# Patient Record
Sex: MALE | Race: WHITE | NOT HISPANIC OR LATINO | Employment: OTHER | URBAN - METROPOLITAN AREA
[De-identification: names, ages, dates, MRNs, and addresses within clinical notes are randomized per-mention and may not be internally consistent; named-entity substitution may affect disease eponyms.]

---

## 2017-03-17 ENCOUNTER — CONVERSION ENCOUNTER (OUTPATIENT)
Dept: FAMILY MEDICINE CLINIC | Facility: CLINIC | Age: 62
End: 2017-03-17

## 2017-03-17 LAB
ALBUMIN SERPL-MCNC: 4.4 G/DL (ref 3.6–5.1)
ALBUMIN/GLOB SERPL: ABNORMAL {RATIO} (ref 1–2.5)
ALP SERPL-CCNC: 60 UNITS/L (ref 40–115)
ALT SERPL-CCNC: 33 UNITS/L (ref 9–46)
AST SERPL-CCNC: 24 UNITS/L (ref 10–35)
BASOPHILS # BLD AUTO: ABNORMAL 10*3/MM3 (ref 0–200)
BASOPHILS NFR BLD AUTO: 0.4 %
BILIRUB SERPL-MCNC: 0.6 MG/DL (ref 0.2–1.2)
BUN SERPL-MCNC: 17 MG/DL (ref 7–25)
BUN/CREAT SERPL: ABNORMAL (ref 6–22)
CALCIUM SERPL-MCNC: 9.5 MG/DL (ref 8.6–10.3)
CHLORIDE SERPL-SCNC: 104 MMOL/L (ref 98–110)
CHOLEST SERPL-MCNC: 200 MG/DL (ref 125–200)
CHOLEST/HDLC SERPL: ABNORMAL {RATIO}
CO2 CONTENT VENOUS: 25 MMOL/L (ref 20–31)
CONV % HGB A1C: ABNORMAL %
CONV COMMENT: 0.06
CONV NEUTROPHILS/100 LEUKOCYTES IN BODY FLUID BY MANUAL COUNT: 65.1 %
CONV TOTAL PROTEIN: 7.4 G/DL (ref 6.1–8.1)
CREAT UR-MCNC: 0.81 MG/DL (ref 0.7–1.25)
EOSINOPHIL # BLD AUTO: 3.6 %
EOSINOPHIL # BLD AUTO: ABNORMAL 10*3/MM3 (ref 15–500)
ERYTHROCYTE [DISTWIDTH] IN BLOOD BY AUTOMATED COUNT: 13.9 % (ref 11–15)
FOLATE SERPL-MCNC: NORMAL NG/ML
GLOBULIN UR ELPH-MCNC: ABNORMAL G/DL (ref 1.9–3.7)
GLUCOSE SERPL-MCNC: 106 MG/DL (ref 65–99)
HCT VFR BLD AUTO: 42.1 % (ref 38.5–50)
HCV AB SER DONR QL: ABNORMAL
HDLC SERPL-MCNC: 32 MG/DL
HGB BLD-MCNC: 14.4 G/DL (ref 13.2–17.1)
LDLC SERPL CALC-MCNC: ABNORMAL MG/DL
LYMPHOCYTES # BLD AUTO: ABNORMAL 10*3/MM3 (ref 850–3900)
LYMPHOCYTES NFR BLD AUTO: 21.8 %
MCH RBC QN AUTO: 29.8 PG (ref 27–33)
MCHC RBC AUTO-ENTMCNC: ABNORMAL % (ref 32–36)
MCV RBC AUTO: 87 FL (ref 80–100)
MONOCYTES # BLD AUTO: ABNORMAL 10*3/MICROLITER (ref 200–950)
MONOCYTES NFR BLD AUTO: 9.1 %
NEUTROPHILS # BLD AUTO: ABNORMAL 10*3/MM3 (ref 1500–7800)
PLATELET # BLD AUTO: ABNORMAL 10*3/MM3 (ref 140–400)
PMV BLD AUTO: 8.6 FL (ref 7.5–12.5)
POTASSIUM SERPL-SCNC: 4.1 MMOL/L (ref 3.5–5.3)
PSA SERPL-MCNC: 3.5 NG/ML
RBC # BLD AUTO: ABNORMAL 10*6/MM3 (ref 4.2–5.8)
SODIUM SERPL-SCNC: 140 MMOL/L (ref 135–146)
T4 FREE SERPL-MCNC: 1.2 NG/DL (ref 0.8–1.8)
TRIGL SERPL-MCNC: 345 MG/DL
TSH SERPL-ACNC: 2.19 MICROINTL UNITS/ML (ref 0.4–4.5)
VIT B12 SERPL-MCNC: 630 PG/ML (ref 200–1100)
WBC # BLD AUTO: ABNORMAL K/UL (ref 3.8–10.8)

## 2018-06-04 ENCOUNTER — HOSPITAL ENCOUNTER (OUTPATIENT)
Dept: FAMILY MEDICINE CLINIC | Facility: CLINIC | Age: 63
Setting detail: SPECIMEN
Discharge: HOME | End: 2018-06-04
Attending: FAMILY MEDICINE | Admitting: FAMILY MEDICINE

## 2018-06-04 LAB
ALBUMIN SERPL-MCNC: 3.9 G/DL (ref 3.5–4.8)
ALBUMIN/GLOB SERPL: 1.3 {RATIO} (ref 1–1.7)
ALP SERPL-CCNC: 55 IU/L (ref 32–91)
ALT SERPL-CCNC: 32 IU/L (ref 17–63)
ANION GAP SERPL CALC-SCNC: 12.6 MMOL/L (ref 10–20)
AST SERPL-CCNC: 32 IU/L (ref 15–41)
BASOPHILS # BLD AUTO: 0.1 10*3/UL (ref 0–0.2)
BASOPHILS NFR BLD AUTO: 1 % (ref 0–2)
BILIRUB SERPL-MCNC: 0.5 MG/DL (ref 0.3–1.2)
BUN SERPL-MCNC: 21 MG/DL (ref 8–20)
BUN/CREAT SERPL: 23.3 (ref 6.2–20.3)
CALCIUM SERPL-MCNC: 9.1 MG/DL (ref 8.9–10.3)
CHLORIDE SERPL-SCNC: 102 MMOL/L (ref 101–111)
CHOLEST SERPL-MCNC: 180 MG/DL
CHOLEST/HDLC SERPL: 6.1 {RATIO}
CONV CO2: 24 MMOL/L (ref 22–32)
CONV LDL CHOLESTEROL DIRECT: 104 MG/DL (ref 0–100)
CONV TOTAL PROTEIN: 7 G/DL (ref 6.1–7.9)
CREAT UR-MCNC: 0.9 MG/DL (ref 0.7–1.2)
DIFFERENTIAL METHOD BLD: (no result)
EOSINOPHIL # BLD AUTO: 0.4 10*3/UL (ref 0–0.3)
EOSINOPHIL # BLD AUTO: 5 % (ref 0–3)
ERYTHROCYTE [DISTWIDTH] IN BLOOD BY AUTOMATED COUNT: 12.8 % (ref 11.5–14.5)
GLOBULIN UR ELPH-MCNC: 3.1 G/DL (ref 2.5–3.8)
GLUCOSE SERPL-MCNC: 103 MG/DL (ref 65–99)
HCT VFR BLD AUTO: 40.6 % (ref 40–54)
HDLC SERPL-MCNC: 29 MG/DL
HGB BLD-MCNC: 13.8 G/DL (ref 14–18)
LDLC/HDLC SERPL: 3.5 {RATIO}
LIPID INTERPRETATION: ABNORMAL
LYMPHOCYTES # BLD AUTO: 1.9 10*3/UL (ref 0.8–4.8)
LYMPHOCYTES NFR BLD AUTO: 25 % (ref 18–42)
MCH RBC QN AUTO: 28.8 PG (ref 26–32)
MCHC RBC AUTO-ENTMCNC: 34 G/DL (ref 32–36)
MCV RBC AUTO: 84.9 FL (ref 80–94)
MONOCYTES # BLD AUTO: 1 10*3/UL (ref 0.1–1.3)
MONOCYTES NFR BLD AUTO: 12 % (ref 2–11)
NEUTROPHILS # BLD AUTO: 4.4 10*3/UL (ref 2.3–8.6)
NEUTROPHILS NFR BLD AUTO: 57 % (ref 50–75)
NRBC BLD AUTO-RTO: 0 /100{WBCS}
NRBC/RBC NFR BLD MANUAL: 0 10*3/UL
PLATELET # BLD AUTO: 207 10*3/UL (ref 150–450)
PMV BLD AUTO: 8.5 FL (ref 7.4–10.4)
POTASSIUM SERPL-SCNC: 3.6 MMOL/L (ref 3.6–5.1)
RBC # BLD AUTO: 4.78 10*6/UL (ref 4.6–6)
SODIUM SERPL-SCNC: 135 MMOL/L (ref 136–144)
TRIGL SERPL-MCNC: 278 MG/DL
VLDLC SERPL CALC-MCNC: 46.9 MG/DL
WBC # BLD AUTO: 7.8 10*3/UL (ref 4.5–11.5)

## 2018-10-10 ENCOUNTER — HOSPITAL ENCOUNTER (OUTPATIENT)
Dept: FAMILY MEDICINE CLINIC | Facility: CLINIC | Age: 63
Setting detail: SPECIMEN
Discharge: HOME | End: 2018-10-10
Attending: FAMILY MEDICINE | Admitting: FAMILY MEDICINE

## 2018-10-10 LAB — PSA SERPL-MCNC: 5.39 NG/ML (ref 0–4)

## 2019-01-09 ENCOUNTER — HOSPITAL ENCOUNTER (OUTPATIENT)
Dept: FAMILY MEDICINE CLINIC | Facility: CLINIC | Age: 64
Setting detail: SPECIMEN
Discharge: HOME | End: 2019-01-09
Attending: FAMILY MEDICINE | Admitting: FAMILY MEDICINE

## 2019-06-06 ENCOUNTER — HOSPITAL ENCOUNTER (OUTPATIENT)
Dept: FAMILY MEDICINE CLINIC | Facility: CLINIC | Age: 64
Setting detail: SPECIMEN
Discharge: HOME | End: 2019-06-06
Attending: FAMILY MEDICINE | Admitting: FAMILY MEDICINE

## 2019-06-06 LAB
ALBUMIN SERPL-MCNC: 3.6 G/DL (ref 3.5–4.8)
ALBUMIN/GLOB SERPL: 1.1 {RATIO} (ref 1–1.7)
ALP SERPL-CCNC: 63 IU/L (ref 32–91)
ALT SERPL-CCNC: 30 IU/L (ref 17–63)
ANION GAP SERPL CALC-SCNC: 17.7 MMOL/L (ref 10–20)
AST SERPL-CCNC: 28 IU/L (ref 15–41)
BASOPHILS # BLD AUTO: 0.1 10*3/UL (ref 0–0.2)
BASOPHILS NFR BLD AUTO: 2 % (ref 0–2)
BILIRUB SERPL-MCNC: 0.5 MG/DL (ref 0.3–1.2)
BUN SERPL-MCNC: 16 MG/DL (ref 8–20)
BUN/CREAT SERPL: 17.8 (ref 6.2–20.3)
CALCIUM SERPL-MCNC: 9.1 MG/DL (ref 8.9–10.3)
CHLORIDE SERPL-SCNC: 99 MMOL/L (ref 101–111)
CHOLEST SERPL-MCNC: 162 MG/DL
CHOLEST/HDLC SERPL: 5.2 {RATIO}
CONV ABS BANDS: 0.1 10*3/UL
CONV ABS IMM GRAN: 0.07 10*3/UL
CONV CO2: 22 MMOL/L (ref 22–32)
CONV LDL CHOLESTEROL DIRECT: 98 MG/DL (ref 0–100)
CONV SMEAR REVIEW: (no result)
CONV TOTAL PROTEIN: 6.9 G/DL (ref 6.1–7.9)
CREAT UR-MCNC: 0.9 MG/DL (ref 0.7–1.2)
DIFFERENTIAL METHOD BLD: (no result)
EOSINOPHIL # BLD AUTO: 0.4 10*3/UL (ref 0–0.3)
EOSINOPHIL # BLD AUTO: 6 % (ref 0–3)
ERYTHROCYTE [DISTWIDTH] IN BLOOD BY AUTOMATED COUNT: 13.1 % (ref 11.5–14.5)
GLOBULIN UR ELPH-MCNC: 3.3 G/DL (ref 2.5–3.8)
GLUCOSE SERPL-MCNC: 104 MG/DL (ref 65–99)
HBA1C MFR BLD: 5.6 % (ref 0–5.6)
HCT VFR BLD AUTO: 41.9 % (ref 40–54)
HDLC SERPL-MCNC: 31 MG/DL
HGB BLD-MCNC: 14.4 G/DL (ref 14–18)
LDLC/HDLC SERPL: 3.2 {RATIO}
LIPID INTERPRETATION: ABNORMAL
LYMPHOCYTES # BLD AUTO: 1.4 10*3/UL (ref 0.8–4.8)
LYMPHOCYTES NFR BLD AUTO: 19 % (ref 18–42)
MCH RBC QN AUTO: 29.6 PG (ref 26–32)
MCHC RBC AUTO-ENTMCNC: 34.4 G/DL (ref 32–36)
MCV RBC AUTO: 86.1 FL (ref 80–94)
METAMYELOCYTES NFR BLD: 1 %
MONOCYTES # BLD AUTO: 0.3 10*3/UL (ref 0.1–1.3)
MONOCYTES NFR BLD AUTO: 4 % (ref 2–11)
NEUTROPHILS # BLD AUTO: 5 10*3/UL (ref 2.3–8.6)
NEUTROPHILS NFR BLD AUTO: 67 % (ref 50–75)
NEUTS BAND NFR BLD MANUAL: 1 % (ref 0–5)
PLATELET # BLD AUTO: 199 10*3/UL (ref 150–450)
PMV BLD AUTO: 8.2 FL (ref 7.4–10.4)
POTASSIUM SERPL-SCNC: 3.7 MMOL/L (ref 3.6–5.1)
PSA SERPL-MCNC: 7.54 NG/ML (ref 0–4)
RBC # BLD AUTO: 4.87 10*6/UL (ref 4.6–6)
SODIUM SERPL-SCNC: 135 MMOL/L (ref 136–144)
T4 FREE SERPL-MCNC: 0.75 NG/DL (ref 0.58–1.64)
TRIGL SERPL-MCNC: 284 MG/DL
TSH SERPL-ACNC: 2.51 UIU/ML (ref 0.34–5.6)
VIT B12 SERPL-MCNC: 507 PG/ML (ref 180–914)
VLDLC SERPL CALC-MCNC: 32.6 MG/DL
WBC # BLD AUTO: 7.4 10*3/UL (ref 4.5–11.5)

## 2019-06-17 RX ORDER — AMLODIPINE BESYLATE 5 MG/1
TABLET ORAL
Qty: 90 TABLET | Refills: 3 | Status: SHIPPED | OUTPATIENT
Start: 2019-06-17 | End: 2020-07-28

## 2019-06-17 RX ORDER — METOPROLOL SUCCINATE 50 MG/1
TABLET, EXTENDED RELEASE ORAL
Qty: 90 TABLET | Refills: 3 | Status: SHIPPED | OUTPATIENT
Start: 2019-06-17 | End: 2020-07-28

## 2019-06-24 RX ORDER — VALSARTAN AND HYDROCHLOROTHIAZIDE 320; 25 MG/1; MG/1
TABLET, FILM COATED ORAL
Qty: 90 TABLET | Refills: 3 | Status: SHIPPED | OUTPATIENT
Start: 2019-06-24 | End: 2020-07-28

## 2019-08-01 RX ORDER — ALPRAZOLAM 0.5 MG/1
TABLET ORAL
Qty: 60 TABLET | Refills: 0 | Status: SHIPPED | OUTPATIENT
Start: 2019-08-01 | End: 2019-10-14 | Stop reason: SDUPTHER

## 2019-10-14 RX ORDER — ALPRAZOLAM 0.5 MG/1
TABLET ORAL
Qty: 60 TABLET | Refills: 0 | Status: SHIPPED | OUTPATIENT
Start: 2019-10-14 | End: 2019-12-16 | Stop reason: SDUPTHER

## 2019-12-16 RX ORDER — ALPRAZOLAM 0.5 MG/1
TABLET ORAL
Qty: 60 TABLET | Refills: 0 | Status: SHIPPED | OUTPATIENT
Start: 2019-12-16 | End: 2020-02-13

## 2020-02-13 DIAGNOSIS — F41.9 ANXIETY: Primary | ICD-10-CM

## 2020-02-13 RX ORDER — ALPRAZOLAM 0.5 MG/1
TABLET ORAL
Qty: 60 TABLET | Refills: 2 | Status: SHIPPED | OUTPATIENT
Start: 2020-02-13 | End: 2020-03-14

## 2020-03-23 ENCOUNTER — OFFICE VISIT (OUTPATIENT)
Dept: FAMILY MEDICINE CLINIC | Facility: CLINIC | Age: 65
End: 2020-03-23

## 2020-03-23 VITALS
SYSTOLIC BLOOD PRESSURE: 130 MMHG | OXYGEN SATURATION: 98 % | HEART RATE: 83 BPM | DIASTOLIC BLOOD PRESSURE: 70 MMHG | TEMPERATURE: 98.3 F | HEIGHT: 72 IN | WEIGHT: 213 LBS | BODY MASS INDEX: 28.85 KG/M2 | RESPIRATION RATE: 16 BRPM

## 2020-03-23 DIAGNOSIS — S46.001A ROTATOR CUFF INJURY, RIGHT, INITIAL ENCOUNTER: Primary | ICD-10-CM

## 2020-03-23 PROBLEM — R97.20 ELEVATED PSA: Status: ACTIVE | Noted: 2018-06-11

## 2020-03-23 PROBLEM — N40.0 BENIGN PROSTATIC HYPERPLASIA WITHOUT URINARY OBSTRUCTION: Status: ACTIVE | Noted: 2018-06-11

## 2020-03-23 PROBLEM — G62.9 PERIPHERAL NEUROPATHY: Status: ACTIVE | Noted: 2017-09-05

## 2020-03-23 PROBLEM — I10 HYPERTENSION: Status: ACTIVE | Noted: 2020-03-23

## 2020-03-23 PROCEDURE — 20610 DRAIN/INJ JOINT/BURSA W/O US: CPT | Performed by: FAMILY MEDICINE

## 2020-03-23 RX ORDER — ALPRAZOLAM 0.5 MG/1
TABLET ORAL
COMMUNITY
Start: 2018-04-06 | End: 2020-10-13

## 2020-03-23 RX ORDER — BUPIVACAINE HYDROCHLORIDE 5 MG/ML
1 INJECTION, SOLUTION PERINEURAL
Status: COMPLETED | OUTPATIENT
Start: 2020-03-23 | End: 2020-03-23

## 2020-03-23 RX ORDER — TADALAFIL 5 MG/1
TABLET ORAL
COMMUNITY
Start: 2020-01-06 | End: 2020-04-16

## 2020-03-23 RX ORDER — METHYLPREDNISOLONE ACETATE 80 MG/ML
40 INJECTION, SUSPENSION INTRA-ARTICULAR; INTRALESIONAL; INTRAMUSCULAR; SOFT TISSUE
Status: COMPLETED | OUTPATIENT
Start: 2020-03-23 | End: 2020-03-23

## 2020-03-23 RX ORDER — METHYLPREDNISOLONE ACETATE 80 MG/ML
80 INJECTION, SUSPENSION INTRA-ARTICULAR; INTRALESIONAL; INTRAMUSCULAR; SOFT TISSUE
Status: COMPLETED | OUTPATIENT
Start: 2020-03-23 | End: 2020-03-23

## 2020-03-23 RX ORDER — GABAPENTIN 100 MG/1
300 CAPSULE ORAL NIGHTLY
COMMUNITY
Start: 2020-02-14 | End: 2022-06-29

## 2020-03-23 RX ORDER — BUPIVACAINE HYDROCHLORIDE 5 MG/ML
0.5 INJECTION, SOLUTION PERINEURAL
Status: COMPLETED | OUTPATIENT
Start: 2020-03-23 | End: 2020-03-23

## 2020-03-23 RX ADMIN — METHYLPREDNISOLONE ACETATE 80 MG: 80 INJECTION, SUSPENSION INTRA-ARTICULAR; INTRALESIONAL; INTRAMUSCULAR; SOFT TISSUE at 15:43

## 2020-03-23 RX ADMIN — BUPIVACAINE HYDROCHLORIDE 0.5 ML: 5 INJECTION, SOLUTION PERINEURAL at 15:43

## 2020-03-23 RX ADMIN — METHYLPREDNISOLONE ACETATE 40 MG: 80 INJECTION, SUSPENSION INTRA-ARTICULAR; INTRALESIONAL; INTRAMUSCULAR; SOFT TISSUE at 15:43

## 2020-03-23 RX ADMIN — BUPIVACAINE HYDROCHLORIDE 1 ML: 5 INJECTION, SOLUTION PERINEURAL at 15:43

## 2020-03-23 NOTE — PATIENT INSTRUCTIONS
Rotator Cuff Tear    A rotator cuff tear is a partial or complete tear of the cord-like bands (tendons) that connect muscle to bone in the rotator cuff. The rotator cuff is a group of muscles and tendons that surround the shoulder joint and keep the upper arm bone (humerus) in the shoulder socket.  The tear can occur suddenly (acute tear) or can develop over a long period of time (chronic tear).  What are the causes?  Acute tears may be caused by:  · A fall, especially on an outstretched arm.  · Lifting very heavy objects with a jerking motion.  Chronic tears may be caused by overuse of the muscles. This may happen in sports, physical work, or activities in which your arm repeatedly moves over your head.  What increases the risk?  This condition is more likely to occur in:  · Athletes and workers who frequently use their shoulder or reach over their heads. This may include activities such as:  ? Tennis.  ? Baseball and softball.  ? Swimming and rowing.  ? Weightlifting.  ? Construction work.  ? Painting.  · People who smoke.  · Older people who have arthritis or poor blood supply. These can make the muscles and tendons weaker.  What are the signs or symptoms?  Symptoms of this condition depend on the type and severity of the injury:  · An acute tear may include a sudden tearing feeling, followed by severe pain that goes from your upper shoulder, down your arm, and toward your elbow.  · A chronic tear includes a gradual weakness and decreased shoulder motion as the pain gets worse. The pain is usually worse at night.  Both types may have symptoms such as:  · Pain that spreads (radiates) from the shoulder to the upper arm.  · Swelling and tenderness in front of the shoulder.  · Decreased range of motion.  · Pain when:  ? Reaching, pulling, or lifting the arm above the head.  ? Lowering the arm from above the head.  · Not being able to raise your arm out to the side.  · Difficulty placing the arm behind your back.  How  is this diagnosed?  This condition is diagnosed with a medical history and physical exam. Imaging tests may also be done, including:  · X-rays.  · MRI.  · Ultrasound.  · CT or MR arthrogram. During this test, a contrast material is injected into your shoulder and then images are taken.  How is this treated?  Treatment for this condition depends on the type and severity of the condition. In less severe cases, treatment may include:  · Rest. This may be done with a sling that holds the shoulder still (immobilization). Your health care provider may also recommend avoiding activities that involve lifting your arm over your head.  · Icing the shoulder.  · Anti-inflammatory medicines, such as aspirin or ibuprofen.  · Strengthening and stretching exercises. Your health care provider may recommend specific exercises to improve your range of motion and strengthen your shoulder.  In more severe cases, treatment may include:  · Physical therapy.  · Steroid injections.  · Surgery.  Follow these instructions at home:  Managing pain, stiffness, and swelling  · If directed, put ice on the injured area.  ? If you have a removable sling, remove it as told by your health care provider.  ? Put ice in a plastic bag.  ? Place a towel between your skin and the bag.  ? Leave the ice on for 20 minutes, 2-3 times a day.  · Raise (elevate) the injured area above the level of your heart while you are lying down.  · Find a comfortable sleeping position or sleep on a recliner, if available.  · Move your fingers often to avoid stiffness and to lessen swelling.  · Once the swelling has gone down, your health care provider may direct you to apply heat to relax the muscles. Use the heat source that your health care provider recommends, such as a moist heat pack or a heating pad.  ? Place a towel between your skin and the heat source.  ? Leave the heat on for 20-30 minutes.  ? Remove the heat if your skin turns bright red. This is especially  important if you are unable to feel pain, heat, or cold. You may have a greater risk of getting burned.  If you have a sling:  · Wear the sling as told by your health care provider. Remove it only as told by your health care provider.  · Loosen the sling if your fingers tingle, become numb, or turn cold and blue.  · Keep the sling clean.  · If the sling is not waterproof:  ? Do not let it get wet.  ? Cover it with a watertight covering when you take a bath or a shower.  Driving  · Do not drive or use heavy machinery while taking prescription pain medicine.  · Ask your health care provider when it is safe to drive if you have a sling on your arm.  Activity  · Rest your shoulder as told by your health care provider.  · Return to your normal activities as told by your health care provider. Ask your health care provider what activities are safe for you.  · Do any exercises or stretches as told by your health care provider.  General instructions  · Do not use any products that contain nicotine or tobacco, such as cigarettes and e-cigarettes. If you need help quitting, ask your health care provider.  · Take over-the-counter and prescription medicines only as told by your health care provider.  · Keep all follow-up visits as told by your health care provider. This is important.  Contact a health care provider if:  · Your pain gets worse.  · You have new pain in your arm, hands, or fingers.  · Medicine does not help your pain.  Get help right away if:  · Your arm, hand, or fingers are numb or tingling.  · Your arm, hand, or fingers are swollen or painful or they turn white or blue.  · Your hand or fingers on your injured arm are colder than your other hand.  Summary  · A rotator cuff tear is a partial or complete tear of the cord-like bands (tendons) that connect muscle to bone in the rotator cuff.  · The tear can occur suddenly (acute tear) or can develop over a long period of time (chronic tear).  · Treatment generally  includes rest, anti-inflammatory medicines, and icing. In some cases, physical therapy and steroid injections may be needed. In severe cases, surgery may be needed.  This information is not intended to replace advice given to you by your health care provider. Make sure you discuss any questions you have with your health care provider.  Document Released: 12/15/2001 Document Revised: 03/05/2018 Document Reviewed: 03/05/2018  [employer] Interactive Patient Education © 2020 [employer]

## 2020-03-23 NOTE — PROGRESS NOTES
Rooming Tab(CC,VS,Pt Hx,Fall Screen)  Chief Complaint   Patient presents with   • Shoulder Pain     right       Subjective    Jacky is a 64-year-old white male who 1 week ago was busting up a sidewalk in front of his home to [facility] area.  He was working hard with a large sledgehammer and some pry bars and was lifting and pushing and pulling heavy pieces of concrete.  He was actually trying to pry 1 piece of large concrete off of the other when he felt a sudden snap or pop in his right shoulder followed by intense pain to the point that he had to quit doing the manual labor.  He did not see any blood come down in the arm or bruising.  He started icing it down and doing very gentle range of motion's.  It is been about a week now and he can still not get his elbow up above shoulder level.  That is better than it was when it first started but it is not near to where it needs to be for him to consider the shoulder back to normal.  He does not have any throbbing pain at night when he is trying to sleep.  There is no heat or actual swelling in or around the shoulder.  It is mainly when he tries to raise his arm laterally from a neutral position that he feels pain at about 60 to 80 degrees elevation laterally.  He is trying to abduct his shoulder when he gets the most pain.  Flexion of the shoulder to 90 degrees is okay but he cannot get it above 90 degrees without using the other arm to push it up.  Extension of the shoulder goes back to about 45 degrees at which point he needs to bring it back down because of the pain.  Internal rotation is limited external rotation seems about normal.  No palpable tenderness over the clavicle the AC joint or the scapula.  The humeral head does not seem to be particularly tender.  There is really no point tenderness anywhere around the shoulder itself.              I have reviewed and updated his medications, medical history and problem list during today's office visit.     Patient  "Care Team:  Leonardo Lujan MD as PCP - General    Problem List Tab  Medications Tab  Synopsis Tab  Chart Review Tab  Care Everywhere Tab  Immunizations Tab  Patient History Tab    Social History     Tobacco Use   • Smoking status: Never Smoker   • Smokeless tobacco: Never Used   Substance Use Topics   • Alcohol use: Yes     Frequency: Never       Review of Systems   Constitutional: Negative for activity change, appetite change, fatigue, fever and unexpected weight loss.   HENT: Negative for congestion, ear pain, hearing loss, postnasal drip, rhinorrhea, sinus pressure, sneezing, sore throat and swollen glands.    Eyes: Negative for blurred vision.   Respiratory: Negative for cough, shortness of breath and wheezing.    Cardiovascular: Negative for chest pain.   Gastrointestinal: Negative for abdominal pain, nausea and indigestion.   Genitourinary: Negative for dysuria, urgency and urinary incontinence.   Musculoskeletal: Negative for arthralgias, back pain, joint swelling and myalgias.   Skin: Negative for dry skin and skin lesions.   Allergic/Immunologic: Negative for environmental allergies.   Neurological: Negative for dizziness, headache, memory problem and confusion.   Hematological: Negative for adenopathy.   Psychiatric/Behavioral: Negative for agitation, depressed mood and stress. The patient is not nervous/anxious.    All other systems reviewed and are negative.      Objective     Rooming Tab(CC,VS,Pt Hx,Fall Screen)  /70 (BP Location: Left arm, Cuff Size: Large Adult)   Pulse 83   Temp 98.3 °F (36.8 °C)   Resp 16   Ht 182.9 cm (72\")   Wt 96.6 kg (213 lb)   SpO2 98%   BMI 28.89 kg/m²     Body mass index is 28.89 kg/m².    Physical Exam   Constitutional: He appears well-developed and well-nourished.   Musculoskeletal: Normal range of motion.   Right shoulder looks normal in appearance.  Patient cannot abduct the shoulder more than about 60 degrees abduction.  He can flex the shoulder " anteriorly to about 90 degrees and extend the shoulder posteriorly about 60 degrees.  Internal rotation is slightly limited compared to the left external rotation is normal palpation of the clavicle and the scapula are normal with no point tenderness humeral head is normal to palpation with no tenderness although there is some soreness over the bicipital tendon insertions anteriorly.   Vitals reviewed.         Statin Choice Calculator  Data Reviewed:      Arthrocentesis  Date/Time: 3/23/2020 3:43 PM  Performed by: Leonardo Lujan MD  Authorized by: Leonardo Lujan MD   Indications: pain   Body area: shoulder  Joint: right shoulder  Local anesthesia used: yes  Anesthesia: local infiltration    Anesthesia:  Local anesthesia used: yes  Local Anesthetic: lidocaine 2% without epinephrine  Anesthetic total: 1 mL    Sedation:  Patient sedated: no    Preparation: Patient was prepped and draped in the usual sterile fashion.  Needle size: 22 G  Ultrasound guidance: no  Approach: anterior  Patient tolerance: Patient tolerated the procedure well with no immediate complications  Comments: I injected the right shoulder anterior approach with 1-1/2 cc of Marcaine and 1- 1/2 cc of 80 mg/cc Depo-Medrol.    He tolerated the procedure well.  Injection was not difficult and I think he will now be doing his rotator cuff exercises and hopefully will see some benefit in the next 2 weeks.                    Assessment/Plan   Order Review Tab  Health Maintenance Tab  Patient Plan/Order Tab  Diagnoses and all orders for this visit:    1. Rotator cuff injury, right, initial encounter (Primary)  Assessment & Plan:  Patient injured the right shoulder while breaking up a sidewalk a week ago doing heavy manual labor.  He had been swinging a sledgehammer for several hours and then began the process of lifting and carrying out large chunks of concrete.  During a period of time when he was pushing down on a crowbar to move a large slab of  concrete he felt a snap or a pop in his right shoulder followed by exquisite pain.  He had to stop working immediately and he went inside and put some ice on it.  He was unable to finish the work on the sidewalk and higher the rest of the workout.  All week now he has been trying to regain some mobility.  Fortunately he has no pain at night he has been able to sleep okay.  Anytime he tries to abduct his elbow away from his side further than about 20 degrees he has intense discomfort in the shoulder and weakness.  He can flex the shoulder to 90 degrees and extend the shoulder to about 40 to 50 degrees but he cannot abduct it away from his side at all.  He feels like he is getting better although slowly.  We went ahead and injected his shoulder with 120 mg of Depo-Medrol and 1-1/2 cc of Marcaine.  He has been given some exercises to do as well.  If he is not better in about 2 weeks we will consider MRI.      Other orders  -     Arthrocentesis      Wrapup Tab  No follow-ups on file.

## 2020-03-23 NOTE — ASSESSMENT & PLAN NOTE
Patient injured the right shoulder while breaking up a sidewalk a week ago doing heavy manual labor.  He had been swinging a sledgehammer for several hours and then began the process of lifting and carrying out large chunks of concrete.  During a period of time when he was pushing down on a crowbar to move a large slab of concrete he felt a snap or a pop in his right shoulder followed by exquisite pain.  He had to stop working immediately and he went inside and put some ice on it.  He was unable to finish the work on the sidewalk and higher the rest of the workout.  All week now he has been trying to regain some mobility.  Fortunately he has no pain at night he has been able to sleep okay.  Anytime he tries to abduct his elbow away from his side further than about 20 degrees he has intense discomfort in the shoulder and weakness.  He can flex the shoulder to 90 degrees and extend the shoulder to about 40 to 50 degrees but he cannot abduct it away from his side at all.  He feels like he is getting better although slowly.  We went ahead and injected his shoulder with 120 mg of Depo-Medrol and 1-1/2 cc of Marcaine.  He has been given some exercises to do as well.  If he is not better in about 2 weeks we will consider MRI.

## 2020-04-08 ENCOUNTER — TELEPHONE (OUTPATIENT)
Dept: FAMILY MEDICINE CLINIC | Facility: CLINIC | Age: 65
End: 2020-04-08

## 2020-04-08 DIAGNOSIS — M75.111 INCOMPLETE ROTATOR CUFF TEAR OR RUPTURE OF RIGHT SHOULDER, NOT SPECIFIED AS TRAUMATIC: Primary | ICD-10-CM

## 2020-04-08 DIAGNOSIS — S46.001A ROTATOR CUFF INJURY, RIGHT, INITIAL ENCOUNTER: ICD-10-CM

## 2020-04-08 NOTE — TELEPHONE ENCOUNTER
PATIENT STATES HIS RIGHT SHOULDER HASN'T GOTTEN ANY BETTER. WOULD LIKE TO GET THE MRI DONE. PLEASE ADVISE     PATIENT CAN BE REACHED AT:[phone]

## 2020-04-09 ENCOUNTER — TELEPHONE (OUTPATIENT)
Dept: FAMILY MEDICINE CLINIC | Facility: CLINIC | Age: 65
End: 2020-04-09

## 2020-04-09 NOTE — TELEPHONE ENCOUNTER
I am pretty sure I put the order in last night.  Check on that and find out who is going to set it up.  I think Andreea is gone now???  Someone has to be doing these orders

## 2020-04-09 NOTE — TELEPHONE ENCOUNTER
Patient said the cortisone shot didn't help his right shoulder and he wants an MRI ordered at [facility].  Please notify patient after order has been faxed.

## 2020-04-09 NOTE — TELEPHONE ENCOUNTER
Spoke with pt and let him know the MRI has been ordered. Pt wants called when he can got have this done.

## 2020-04-09 NOTE — TELEPHONE ENCOUNTER
Each MA will take care of their assigned provider.    Patient has Medicare does not require PA.  I will route to [facility] to schedule.

## 2020-04-13 ENCOUNTER — APPOINTMENT (OUTPATIENT)
Dept: MRI IMAGING | Facility: HOSPITAL | Age: 65
End: 2020-04-13

## 2020-04-13 ENCOUNTER — HOSPITAL ENCOUNTER (OUTPATIENT)
Dept: MRI IMAGING | Facility: HOSPITAL | Age: 65
Discharge: HOME | End: 2020-04-13
Admitting: FAMILY MEDICINE

## 2020-04-13 DIAGNOSIS — S46.001A ROTATOR CUFF INJURY, RIGHT, INITIAL ENCOUNTER: ICD-10-CM

## 2020-04-13 DIAGNOSIS — M75.111 INCOMPLETE ROTATOR CUFF TEAR OR RUPTURE OF RIGHT SHOULDER, NOT SPECIFIED AS TRAUMATIC: ICD-10-CM

## 2020-04-13 PROCEDURE — 73221 MRI JOINT UPR EXTREM W/O DYE: CPT

## 2020-04-14 ENCOUNTER — TELEPHONE (OUTPATIENT)
Dept: FAMILY MEDICINE CLINIC | Facility: CLINIC | Age: 65
End: 2020-04-14

## 2020-04-14 DIAGNOSIS — S46.001A ROTATOR CUFF INJURY, RIGHT, INITIAL ENCOUNTER: Primary | ICD-10-CM

## 2020-04-14 NOTE — TELEPHONE ENCOUNTER
----- Message from Leonardo Lujan MD sent at 4/13/2020  8:32 PM EDT -----  Tell Jacky he has a tear in several of the major tendons of his rotator cuff.  We need to see a shoulder specialist

## 2020-04-14 NOTE — PROGRESS NOTES
Tell Jacky he has a tear in several of the major tendons of his rotator cuff.  We need to see a shoulder specialist

## 2020-04-14 NOTE — TELEPHONE ENCOUNTER
Gave message to patient at 8:58am.  He said he doesn't have or know one so whoever you recommend is fine.

## 2020-04-14 NOTE — PROGRESS NOTES
If Jacky has not seen a doctor yet for his shoulder we probably need to set him up to see Dr. Corrigan unless he has someone else in mind

## 2020-04-15 ENCOUNTER — PREP FOR SURGERY (OUTPATIENT)
Dept: OTHER | Facility: HOSPITAL | Age: 65
End: 2020-04-15

## 2020-04-15 ENCOUNTER — OFFICE VISIT (OUTPATIENT)
Dept: ORTHOPEDIC SURGERY | Facility: CLINIC | Age: 65
End: 2020-04-15

## 2020-04-15 VITALS
SYSTOLIC BLOOD PRESSURE: 154 MMHG | HEART RATE: 87 BPM | DIASTOLIC BLOOD PRESSURE: 94 MMHG | BODY MASS INDEX: 28.85 KG/M2 | TEMPERATURE: 97.9 F | HEIGHT: 72 IN | WEIGHT: 213 LBS

## 2020-04-15 DIAGNOSIS — M75.121 COMPLETE TEAR OF RIGHT ROTATOR CUFF, UNSPECIFIED WHETHER TRAUMATIC: ICD-10-CM

## 2020-04-15 DIAGNOSIS — R94.5 ABNORMAL RESULTS OF LIVER FUNCTION STUDIES: ICD-10-CM

## 2020-04-15 DIAGNOSIS — M75.121 COMPLETE TEAR OF RIGHT ROTATOR CUFF, UNSPECIFIED WHETHER TRAUMATIC: Primary | ICD-10-CM

## 2020-04-15 DIAGNOSIS — M25.511 ACUTE PAIN OF RIGHT SHOULDER: Primary | ICD-10-CM

## 2020-04-15 DIAGNOSIS — R79.1 ABNORMAL COAGULATION PROFILE: ICD-10-CM

## 2020-04-15 PROCEDURE — 99203 OFFICE O/P NEW LOW 30 MIN: CPT | Performed by: ORTHOPAEDIC SURGERY

## 2020-04-15 NOTE — PROGRESS NOTES
"     Patient ID: Nahum Restrepo is a 64 y.o. male.    Chief Complaint:    Chief Complaint   Patient presents with   • Right Shoulder - Consult       HPI:  Nahum is a 64-year-old gentleman here with about 6 weeks of right shoulder pain.  He noted pain initially while doing some sidewalk work.  He was then doing bench press and felt a pop in his shoulder.  Since then he has had pain and difficulty lifting his arm.  Pain is deep in the shoulder with referral to the deltoid.  It is not much better with rest or medication or ice.  It is sharp and a 7/10  Past Medical History:   Diagnosis Date   • Hypertension        Past Surgical History:   Procedure Laterality Date   • SHOULDER SURGERY Left        Family History   Problem Relation Age of Onset   • Arthritis Mother    • Osteoarthritis Mother    • Other Mother         Immobilized   • Diabetes Sister           Social History     Occupational History   • Not on file   Tobacco Use   • Smoking status: Never Smoker   • Smokeless tobacco: Never Used   Substance and Sexual Activity   • Alcohol use: Yes     Frequency: Never   • Drug use: Yes     Types: Marijuana   • Sexual activity: Not on file      Review of Systems   Cardiovascular: Negative for chest pain.   Musculoskeletal: Positive for arthralgias.       Objective:    /94   Pulse 87   Temp 97.9 °F (36.6 °C)   Ht 182.9 cm (72\")   Wt 96.6 kg (213 lb)   BMI 28.89 kg/m²     Physical Examination:  He is a pleasant male in no distress. He is alert and oriented x3 and appears his stated age.  Right shoulder demonstrates no scars and no atrophy.  There is mild pain of the impingement area.  Passive elevation is 180 degrees abduction 130 degrees external rotation 40 degrees internal tension L5.  He has pain and weakness on Speed: Right common supraspinatus testing.  Belly press and liftoff are 5/5 in 4/5.Sensory and motor exam are intact all distributions. Radial pulse is palpable and capillary refill is less than two " seconds to all digits    Imaging:  X-rays demonstrate well-maintained joint spaces, MRI demonstrates complex full-thickness tear at the musculotendinous junction of the supraspinatus    Assessment:  Right shoulder rotator cuff tear    Plan:  Treatment options discussed.  Discussed the unusual nature of his tear pattern, however he essentially has a full-thickness cuff tear.  Treatment options discussed and he would like to proceed with shoulder arthroscopy with cuff repairRisks and benefits, specifically risks of bleeding, scar, infection, fracture, stiffness, retear, nerve, tendon, artery damage, the need for further surgery, DVT, and loss of life or limb and rehab were discussed. All questions were answered and addressed.          Disclaimer: Please note that areas of this note were completed with computer voice recognition software.  Quite often unanticipated grammatical, syntax, homophones, and other interpretive errors are inadvertently transcribed by the computer software. Please excuse any errors that have escaped final proofreading.

## 2020-04-16 RX ORDER — TADALAFIL 5 MG/1
TABLET ORAL
Qty: 90 TABLET | Refills: 0 | Status: SHIPPED | OUTPATIENT
Start: 2020-04-16 | End: 2020-07-22

## 2020-07-22 RX ORDER — TADALAFIL 5 MG/1
TABLET ORAL
Qty: 90 TABLET | Refills: 3 | Status: SHIPPED | OUTPATIENT
Start: 2020-07-22 | End: 2021-06-16 | Stop reason: HOSPADM

## 2020-07-28 RX ORDER — VALSARTAN AND HYDROCHLOROTHIAZIDE 320; 25 MG/1; MG/1
TABLET, FILM COATED ORAL
Qty: 90 TABLET | Refills: 3 | Status: SHIPPED | OUTPATIENT
Start: 2020-07-28 | End: 2021-12-22 | Stop reason: SDUPTHER

## 2020-07-28 RX ORDER — AMLODIPINE BESYLATE 5 MG/1
TABLET ORAL
Qty: 90 TABLET | Refills: 3 | Status: SHIPPED | OUTPATIENT
Start: 2020-07-28 | End: 2021-12-22 | Stop reason: SDUPTHER

## 2020-07-28 RX ORDER — METOPROLOL SUCCINATE 50 MG/1
TABLET, EXTENDED RELEASE ORAL
Qty: 90 TABLET | Refills: 3 | Status: SHIPPED | OUTPATIENT
Start: 2020-07-28 | End: 2021-12-22 | Stop reason: SDUPTHER

## 2020-10-13 DIAGNOSIS — F41.9 ANXIETY: Primary | ICD-10-CM

## 2020-10-13 RX ORDER — ALPRAZOLAM 0.5 MG/1
TABLET ORAL
Qty: 60 TABLET | Refills: 3 | Status: SHIPPED | OUTPATIENT
Start: 2020-10-13 | End: 2021-06-11

## 2021-02-17 ENCOUNTER — OFFICE VISIT (OUTPATIENT)
Dept: FAMILY MEDICINE CLINIC | Facility: CLINIC | Age: 66
End: 2021-02-17

## 2021-02-17 VITALS
OXYGEN SATURATION: 95 % | WEIGHT: 216 LBS | DIASTOLIC BLOOD PRESSURE: 94 MMHG | SYSTOLIC BLOOD PRESSURE: 150 MMHG | HEIGHT: 72 IN | HEART RATE: 87 BPM | RESPIRATION RATE: 16 BRPM | BODY MASS INDEX: 29.26 KG/M2 | TEMPERATURE: 97.1 F

## 2021-02-17 DIAGNOSIS — R06.83 SNORING: Primary | ICD-10-CM

## 2021-02-17 DIAGNOSIS — I10 ESSENTIAL HYPERTENSION: ICD-10-CM

## 2021-02-17 DIAGNOSIS — M25.561 RIGHT KNEE PAIN, UNSPECIFIED CHRONICITY: ICD-10-CM

## 2021-02-17 PROCEDURE — 99213 OFFICE O/P EST LOW 20 MIN: CPT | Performed by: FAMILY MEDICINE

## 2021-02-17 NOTE — PROGRESS NOTES
"Chief Complaint  Knee Pain (right) and Sleep Apnea     Subjective          Nahum Restrepo presents to [facility]  Nahum, 65 year old while male, here today with right knee pain and snoring.  He reports that the pain in the right knee started at the 1st of the year.  It is an aching pain, is worse in the morning, and gets better as the day goes on.  In January, he went to the [facility] to have it evaluated.  He was given a prescription for Etodolac.  He reports taking it occasionally, that when he does take it it helps, and last took it yesterday.  Pain prior to Etodolac is 5/10 and after is, 1/10.  He also reports some \"clicking\" in his right knee.  He is also here today due to snoring.  His family tells him that he snores at night, but he does not know if he is obstructing.  He would like to be evaluated for sleep apnea.  He also has a history of HTN.  He reports no acute changes at this time- no headach, chest pain, palpitations, changes with urination, vision changes, or intermittent claudication.    Knee Pain     Sleep Apnea  Pertinent negatives include no arthralgias, chest pain, coughing, fatigue, headaches or rash.       Review of Systems   Constitutional: Negative for activity change and fatigue.   Respiratory: Negative for apnea, cough, chest tightness and shortness of breath.    Cardiovascular: Negative for chest pain, palpitations and leg swelling.   Endocrine: Negative for polydipsia, polyphagia and polyuria.   Genitourinary: Negative for difficulty urinating.   Musculoskeletal: Negative for arthralgias.        Pain to right knee.     Skin: Negative for pallor and rash.   Neurological: Negative for dizziness.     Objective   Vital Signs:   /94 (BP Location: Left arm)   Pulse 87   Temp 97.1 °F (36.2 °C) (Temporal)   Resp 16   Ht 182.9 cm (72\")   Wt 98 kg (216 lb)   SpO2 95%   BMI 29.29 kg/m²     Physical Exam  Constitutional:       Appearance: " Normal appearance. He is normal weight.   HENT:      Head: Normocephalic.      Right Ear: Tympanic membrane, ear canal and external ear normal.      Left Ear: Tympanic membrane, ear canal and external ear normal.      Nose: Nose normal.      Mouth/Throat:      Mouth: Mucous membranes are moist.   Eyes:      Pupils: Pupils are equal, round, and reactive to light.   Neck:      Musculoskeletal: Normal range of motion.   Cardiovascular:      Rate and Rhythm: Normal rate and regular rhythm.      Pulses: Normal pulses.   Pulmonary:      Effort: Pulmonary effort is normal.      Breath sounds: Normal breath sounds.   Abdominal:      General: Abdomen is flat.   Musculoskeletal:         General: Tenderness present.      Comments: Pain to medial aspect of right knee   Skin:     General: Skin is warm.      Capillary Refill: Capillary refill takes less than 2 seconds.   Neurological:      General: No focal deficit present.      Mental Status: He is alert and oriented to person, place, and time. Mental status is at baseline.   Psychiatric:         Mood and Affect: Mood normal.         Behavior: Behavior normal.         Thought Content: Thought content normal.         Judgment: Judgment normal.        Result Review :                 Assessment and Plan    Diagnoses and all orders for this visit:    1. Snoring (Primary)  Assessment & Plan:  Snoring at night with unknown obstruction.  Referral for sleep study.          2. Right knee pain, unspecified chronicity  Assessment & Plan:  Right knee pain to medial aspect.  Knee x-ray      3. Essential hypertension  Assessment & Plan:  Hypertension is improving with treatment.  Continue current treatment regimen.  Regular aerobic exercise.  Continue current medications.  Ambulatory blood pressure monitoring.  Blood pressure will be reassessed at the next regular appointment.    Check BP at home with home monitor and report readings.        Follow Up   Return in about 4 months (around  6/17/2021) for Medicare Wellness, Annual physical.  Patient was given instructions and counseling regarding his condition or for health maintenance advice. Please see specific information pulled into the AVS if appropriate.

## 2021-02-17 NOTE — PATIENT INSTRUCTIONS
Sleep Apnea  Sleep apnea is a condition in which breathing pauses or becomes shallow during sleep. Episodes of sleep apnea usually last 10 seconds or longer, and they may occur as many as 20 times an hour. Sleep apnea disrupts your sleep and keeps your body from getting the rest that it needs. This condition can increase your risk of certain health problems, including:  · Heart attack.  · Stroke.  · Obesity.  · Diabetes.  · Heart failure.  · Irregular heartbeat.  What are the causes?  There are three kinds of sleep apnea:  · Obstructive sleep apnea. This kind is caused by a blocked or collapsed airway.  · Central sleep apnea. This kind happens when the part of the brain that controls breathing does not send the correct signals to the muscles that control breathing.  · Mixed sleep apnea. This is a combination of obstructive and central sleep apnea.  The most common cause of this condition is a collapsed or blocked airway. An airway can collapse or become blocked if:  · Your throat muscles are abnormally relaxed.  · Your tongue and tonsils are larger than normal.  · You are overweight.  · Your airway is smaller than normal.  What increases the risk?  You are more likely to develop this condition if you:  · Are overweight.  · Smoke.  · Have a smaller than normal airway.  · Are elderly.  · Are male.  · Drink alcohol.  · Take sedatives or tranquilizers.  · Have a family history of sleep apnea.  What are the signs or symptoms?  Symptoms of this condition include:  · Trouble staying asleep.  · Daytime sleepiness and tiredness.  · Irritability.  · Loud snoring.  · Morning headaches.  · Trouble concentrating.  · Forgetfulness.  · Decreased interest in sex.  · Unexplained sleepiness.  · Mood swings.  · Personality changes.  · Feelings of depression.  · Waking up often during the night to urinate.  · Dry mouth.  · Sore throat.  How is this diagnosed?  This condition may be diagnosed with:  · A medical history.  · A physical  exam.  · A series of tests that are done while you are sleeping (sleep study). These tests are usually done in a sleep lab, but they may also be done at home.  How is this treated?  Treatment for this condition aims to restore normal breathing and to ease symptoms during sleep. It may involve managing health issues that can affect breathing, such as high blood pressure or obesity. Treatment may include:  · Sleeping on your side.  · Using a decongestant if you have nasal congestion.  · Avoiding the use of depressants, including alcohol, sedatives, and narcotics.  · Losing weight if you are overweight.  · Making changes to your diet.  · Quitting smoking.  · Using a device to open your airway while you sleep, such as:  ? An oral appliance. This is a custom-made mouthpiece that shifts your lower jaw forward.  ? A continuous positive airway pressure (CPAP) device. This device blows air through a mask when you breathe out (exhale).  ? A nasal expiratory positive airway pressure (EPAP) device. This device has valves that you put into each nostril.  ? A bi-level positive airway pressure (BPAP) device. This device blows air through a mask when you breathe in (inhale) and breathe out (exhale).  · Having surgery if other treatments do not work. During surgery, excess tissue is removed to create a wider airway.  It is important to get treatment for sleep apnea. Without treatment, this condition can lead to:  · High blood pressure.  · Coronary artery disease.  · In men, an inability to achieve or maintain an erection (impotence).  · Reduced thinking abilities.  Follow these instructions at home:  Lifestyle  · Make any lifestyle changes that your health care provider recommends.  · Eat a healthy, well-balanced diet.  · Take steps to lose weight if you are overweight.  · Avoid using depressants, including alcohol, sedatives, and narcotics.  · Do not use any products that contain nicotine or tobacco, such as cigarettes,  e-cigarettes, and chewing tobacco. If you need help quitting, ask your health care provider.  General instructions  · Take over-the-counter and prescription medicines only as told by your health care provider.  · If you were given a device to open your airway while you sleep, use it only as told by your health care provider.  · If you are having surgery, make sure to tell your health care provider you have sleep apnea. You may need to bring your device with you.  · Keep all follow-up visits as told by your health care provider. This is important.  Contact a health care provider if:  · The device that you received to open your airway during sleep is uncomfortable or does not seem to be working.  · Your symptoms do not improve.  · Your symptoms get worse.  Get help right away if:  · You develop:  ? Chest pain.  ? Shortness of breath.  ? Discomfort in your back, arms, or stomach.  · You have:  ? Trouble speaking.  ? Weakness on one side of your body.  ? Drooping in your face.  These symptoms may represent a serious problem that is an emergency. Do not wait to see if the symptoms will go away. Get medical help right away. Call your local emergency services (911 in the [place] Do not drive yourself to the hospital.  Summary  · Sleep apnea is a condition in which breathing pauses or becomes shallow during sleep.  · The most common cause is a collapsed or blocked airway.  · The goal of treatment is to restore normal breathing and to ease symptoms during sleep.  This information is not intended to replace advice given to you by your health care provider. Make sure you discuss any questions you have with your health care provider.  Document Revised: 06/03/2020 Document Reviewed: 08/13/2019  Elsevier Patient Education © 2020 [employer]  Osteoarthritis    Osteoarthritis is a type of arthritis that affects tissue that covers the ends of bones in joints (cartilage). Cartilage acts as a cushion between the bones and helps them  "move smoothly. Osteoarthritis results when cartilage in the joints gets worn down. Osteoarthritis is sometimes called \"wear and tear\" arthritis.  Osteoarthritis is the most common form of arthritis. It often occurs in older people. It is a condition that gets worse over time (a progressive condition). Joints that are most often affected by this condition are in:  · Fingers.  · Toes.  · Hips.  · Knees.  · Spine, including neck and lower back.  What are the causes?  This condition is caused by age-related wearing down of cartilage that covers the ends of bones.  What increases the risk?  The following factors may make you more likely to develop this condition:  · Older age.  · Being overweight or obese.  · Overuse of joints, such as in athletes.  · Past injury of a joint.  · Past surgery on a joint.  · Family history of osteoarthritis.  What are the signs or symptoms?  The main symptoms of this condition are pain, swelling, and stiffness in the joint. The joint may lose its shape over time. Small pieces of bone or cartilage may break off and float inside of the joint, which may cause more pain and damage to the joint. Small deposits of bone (osteophytes) may grow on the edges of the joint. Other symptoms may include:  · A grating or scraping feeling inside the joint when you move it.  · Popping or creaking sounds when you move.  Symptoms may affect one or more joints. Osteoarthritis in a major joint, such as your knee or hip, can make it painful to walk or exercise. If you have osteoarthritis in your hands, you might not be able to  items, twist your hand, or control small movements of your hands and fingers (fine motor skills).  How is this diagnosed?  This condition may be diagnosed based on:  · Your medical history.  · A physical exam.  · Your symptoms.  · X-rays of the affected joint(s).  · Blood tests to rule out other types of arthritis.  How is this treated?  There is no cure for this condition, but " treatment can help to control pain and improve joint function. Treatment plans may include:  · A prescribed exercise program that allows for rest and joint relief. You may work with a physical therapist.  · A weight control plan.  · Pain relief techniques, such as:  ? Applying heat and cold to the joint.  ? Electric pulses delivered to nerve endings under the skin (transcutaneous electrical nerve stimulation, or TENS).  ? Massage.  ? Certain nutritional supplements.  · NSAIDs or prescription medicines to help relieve pain.  · Medicine to help relieve pain and inflammation (corticosteroids). This can be given by mouth (orally) or as an injection.  · Assistive devices, such as a brace, wrap, splint, specialized glove, or cane.  · Surgery, such as:  ? An osteotomy. This is done to reposition the bones and relieve pain or to remove loose pieces of bone and cartilage.  ? Joint replacement surgery. You may need this surgery if you have very bad (advanced) osteoarthritis.  Follow these instructions at home:  Activity  · Rest your affected joints as directed by your health care provider.  · Do not drive or use heavy machinery while taking prescription pain medicine.  · Exercise as directed. Your health care provider or physical therapist may recommend specific types of exercise, such as:  ? Strengthening exercises. These are done to strengthen the muscles that support joints that are affected by arthritis. They can be performed with weights or with exercise bands to add resistance.  ? Aerobic activities. These are exercises, such as brisk walking or water aerobics, that get your heart pumping.  ? Range-of-motion activities. These keep your joints easy to move.  ? Balance and agility exercises.  Managing pain, stiffness, and swelling         · If directed, apply heat to the affected area as often as told by your health care provider. Use the heat source that your health care provider recommends, such as a moist heat pack or  a heating pad.  ? If you have a removable assistive device, remove it as told by your health care provider.  ? Place a towel between your skin and the heat source. If your health care provider tells you to keep the assistive device on while you apply heat, place a towel between the assistive device and the heat source.  ? Leave the heat on for 20-30 minutes.  ? Remove the heat if your skin turns bright red. This is especially important if you are unable to feel pain, heat, or cold. You may have a greater risk of getting burned.  · If directed, put ice on the affected joint:  ? If you have a removable assistive device, remove it as told by your health care provider.  ? Put ice in a plastic bag.  ? Place a towel between your skin and the bag. If your health care provider tells you to keep the assistive device on during icing, place a towel between the assistive device and the bag.  ? Leave the ice on for 20 minutes, 2-3 times a day.  General instructions  · Take over-the-counter and prescription medicines only as told by your health care provider.  · Maintain a healthy weight. Follow instructions from your health care provider for weight control. These may include dietary restrictions.  · Do not use any products that contain nicotine or tobacco, such as cigarettes and e-cigarettes. These can delay bone healing. If you need help quitting, ask your health care provider.  · Use assistive devices as directed by your health care provider.  · Keep all follow-up visits as told by your health care provider. This is important.  Where to find more information  · National [place] of Arthritis and Musculoskeletal and Skin Diseases: www.niams.nih.gov  · National [place] on Aging: www.shanika.nih.gov  · American College of Rheumatology: www.rheumatology.org  Contact a health care provider if:  · Your skin turns red.  · You develop a rash.  · You have pain that gets worse.  · You have a fever along with joint or muscle aches.  Get  help right away if:  · You lose a lot of weight.  · You suddenly lose your appetite.  · You have night sweats.  Summary  · Osteoarthritis is a type of arthritis that affects tissue covering the ends of bones in joints (cartilage).  · This condition is caused by age-related wearing down of cartilage that covers the ends of bones.  · The main symptom of this condition is pain, swelling, and stiffness in the joint.  · There is no cure for this condition, but treatment can help to control pain and improve joint function.  This information is not intended to replace advice given to you by your health care provider. Make sure you discuss any questions you have with your health care provider.  Document Revised: 11/30/2018 Document Reviewed: 08/21/2017  [employer] Patient Education © 2020 [employer].

## 2021-02-17 NOTE — ASSESSMENT & PLAN NOTE
Hypertension is improving with treatment.  Continue current treatment regimen.  Regular aerobic exercise.  Continue current medications.  Ambulatory blood pressure monitoring.  Blood pressure will be reassessed at the next regular appointment.    Check BP at home with home monitor and report readings.

## 2021-02-25 ENCOUNTER — HOSPITAL ENCOUNTER (OUTPATIENT)
Dept: GENERAL RADIOLOGY | Facility: HOSPITAL | Age: 66
Discharge: HOME | End: 2021-02-25
Admitting: FAMILY MEDICINE

## 2021-02-25 DIAGNOSIS — R06.83 SNORING: ICD-10-CM

## 2021-02-25 DIAGNOSIS — M25.561 RIGHT KNEE PAIN, UNSPECIFIED CHRONICITY: ICD-10-CM

## 2021-02-25 DIAGNOSIS — I10 ESSENTIAL HYPERTENSION: ICD-10-CM

## 2021-02-25 PROCEDURE — 73562 X-RAY EXAM OF KNEE 3: CPT

## 2021-03-01 ENCOUNTER — TELEPHONE (OUTPATIENT)
Dept: FAMILY MEDICINE CLINIC | Facility: CLINIC | Age: 66
End: 2021-03-01

## 2021-03-01 DIAGNOSIS — M17.11 ARTHRITIS OF RIGHT KNEE: ICD-10-CM

## 2021-03-01 DIAGNOSIS — M25.561 ACUTE PAIN OF BOTH KNEES: Primary | ICD-10-CM

## 2021-03-01 DIAGNOSIS — M25.562 ACUTE PAIN OF BOTH KNEES: Primary | ICD-10-CM

## 2021-03-01 RX ORDER — ETODOLAC 400 MG/1
TABLET, FILM COATED ORAL
Qty: 60 TABLET | Refills: 3 | Status: SHIPPED | OUTPATIENT
Start: 2021-03-01 | End: 2021-07-22

## 2021-03-01 NOTE — TELEPHONE ENCOUNTER
Tell Jacky that his knee x-rays both show mild osteoarthritis under the patella.  The medial lateral compartments of his knee are not too bad.  He would benefit from some weight loss and from some physical therapy to strengthen the muscles in his legs so that they support the knees better.  Also occasionally we could put injections of cortisone in there once or twice a year if they were hurting really bad.  Lets start with some physical therapy and see how he does

## 2021-03-01 NOTE — TELEPHONE ENCOUNTER
Spoke to pt he will do the PT    He would like a refill of etodolac that [facility] gave him. I loaded it below

## 2021-03-01 NOTE — TELEPHONE ENCOUNTER
Caller: Nahum Restrepo    Relationship: Self      Best call back number: [phone]     Caller requesting test results: XRAY    What test was performed: XRAY OF KNEE    When was the test performed: 2/25/2021    Where was the test performed: [facility]

## 2021-03-08 ENCOUNTER — TREATMENT (OUTPATIENT)
Dept: PHYSICAL THERAPY | Facility: CLINIC | Age: 66
End: 2021-03-08

## 2021-03-08 DIAGNOSIS — M25.561 ACUTE PAIN OF RIGHT KNEE: Primary | ICD-10-CM

## 2021-03-08 PROCEDURE — 97162 PT EVAL MOD COMPLEX 30 MIN: CPT | Performed by: PHYSICAL THERAPIST

## 2021-03-08 PROCEDURE — 97110 THERAPEUTIC EXERCISES: CPT | Performed by: PHYSICAL THERAPIST

## 2021-03-08 NOTE — PROGRESS NOTES
Physical Therapy Initial Evaluation and Plan of Care    Patient: Nahum Restrepo   : 1955  Diagnosis/ICD-10 Code:  Acute pain of right knee [M25.561]  Referring practitioner: Leonardo Lujan MD    Subjective Evaluation    History of Present Illness  Mechanism of injury: No specific mechanism    HR: 86 BPM  O2 saturation: 96 %  BP: 100/80 mmHG    Subjective comment: Patient is a 65 year old male who presents with right knee pain.  He reports 4 week history of knee pain without specific mechanism.  He reports pain on the inside of his knee with sitting, driving, squatting and after extended time on his feet.  No prior knee pain or limitation and no change in activity.   Patient Occupation: Retired Quality of life: good    Pain  Current pain rating: 3  At best pain rating: 3  At worst pain ratin  Pain location: Right knee.  Quality: dull ache  Relieving factors: medications and rest  Aggravating factors: squatting, ambulation and prolonged positioning (Bending his knee)  Progression: no change    Diagnostic Tests  X-ray: abnormal    Treatments  Previous treatment: medication  Current treatment: physical therapy  Patient Goals  Patient goals for therapy: decreased pain, return to sport/leisure activities and independence with ADLs/IADLs             Objective          Active Range of Motion   Left Knee   Flexion: 135 degrees   Extension: Left knee active extension: 2 HE.     Right Knee   Flexion: 125 degrees   Extensor la degrees     Patellar Mobility   Left Knee Patellar tendons within functional limits include the medial, lateral, superior and inferior.     Right Knee Patellar tendons within functional limits include the medial and lateral. Hypomobile in the superior and inferior patellar tendon(s).     Strength/Myotome Testing     Left Knee   Flexion: 5  Extension: 5  Quadriceps contraction: good    Right Knee   Flexion: 5  Extension: 5  Quadriceps contraction: good    Tests     Left Knee    Negative lateral Sukh, medial Sukh, Thessaly's test at 5 degrees, Thessaly's test at 20 degrees, valgus stress test at 0 degrees, valgus stress test at 30 degrees, varus stress test at 0 degrees and varus stress test at 30 degrees.     Right Knee   Positive medial Sukh.   Negative lateral Sukh, Thessaly's test at 5 degrees, Thessaly's test at 20 degrees, valgus stress test at 0 degrees, valgus stress test at 30 degrees, varus stress test at 0 degrees and varus stress test at 30 degrees.           Assessment & Plan     Assessment  Impairments: abnormal or restricted ROM, activity intolerance, impaired physical strength and pain with function  Assessment details: Presents with right knee pain with decreased PF mobility, decreased knee AROM/PROM R with more limitation in knee FL, decreased hip AB strength and single leg balance.   Positive Sukh's for meniscus involvement but negative Thessaly's.   He would benefit from skilled therapy to address the above and restore to prior level of function w/o pain.   Prognosis: good  Functional Limitations: carrying objects, lifting, walking, pulling and pushing  Goals  Plan Goals: ST. R knee pain decreased to 4/10 at worst with ADL's over 2 weeks.   2. R knee extension = L and knee FL improved 5 degrees or greater w/o pain actively over 2 weeks.   3. Independent and compliant with HEP over 2 weeks.       LT. Knee Outcome Survey score improved to 80% or greater within 6 weeks.   2. R knee strength = L within 6 weeks.   3. R knee pain 1-2/10 or less with ADL's over 6 weeks.   4. To verbalize readiness for discharge per improved symptoms and independence with HEP over 6 weeks.     Plan  Therapy options: will be seen for skilled physical therapy services  Planned modality interventions: electrical stimulation/Russian stimulation, TENS, thermotherapy (hydrocollator packs) and cryotherapy  Planned therapy interventions: manual therapy, neuromuscular  re-education, balance/weight-bearing training, soft tissue mobilization, flexibility, functional ROM exercises, strengthening, stretching, home exercise program, therapeutic activities and joint mobilization  Frequency: 2x week  Duration in visits: 12  Treatment plan discussed with: patient          Timed:         Manual Therapy:    5     mins  91842;     Therapeutic Exercise:    8     mins  26696;         Un-Timed:  Mod Eval     30   Mins  08096      Timed Treatment:   13   mins   Total Treatment:     43   mins    PT SIGNATURE: Torin Freeman PT, DPT       DATE TREATMENT INITIATED: 3/8/2021    Medicare Initial Certification  Certification Period: 6/6/2021  I certify that the therapy services are furnished while this patient is under my care.  The services outlined above are required by this patient, and will be reviewed every 90 days.     PHYSICIAN: Leonardo Lujan MD      DATE:     Please sign and return via fax to [phone].. Thank you, [facility].

## 2021-03-11 ENCOUNTER — TREATMENT (OUTPATIENT)
Dept: PHYSICAL THERAPY | Facility: CLINIC | Age: 66
End: 2021-03-11

## 2021-03-11 DIAGNOSIS — M25.561 ACUTE PAIN OF RIGHT KNEE: Primary | ICD-10-CM

## 2021-03-11 PROCEDURE — 97140 MANUAL THERAPY 1/> REGIONS: CPT | Performed by: PHYSICAL THERAPIST

## 2021-03-11 PROCEDURE — 97110 THERAPEUTIC EXERCISES: CPT | Performed by: PHYSICAL THERAPIST

## 2021-03-11 NOTE — PROGRESS NOTES
Physical Therapy Daily Progress Note  Visit: 2    Nahum Restrepo reports: no new issues today.     Subjective       Objective   See Exercise, Manual, and Modality Logs for complete treatment.       Assessment/Plan     R knee extension AROM improved post tx - good tolerance to progression of closed/open chain loading and progression of ROM activity.     Plan:    Continue current         Timed:         Manual Therapy:    8     mins  40887;     Therapeutic Exercise:    30     mins  65605;     Therapeutic Activity:     5     mins  94264;          Timed Treatment:   43   mins   Total Treatment:     43   mins  Torin Freeman, PT, DPT  Physical Therapist

## 2021-03-15 ENCOUNTER — TREATMENT (OUTPATIENT)
Dept: PHYSICAL THERAPY | Facility: CLINIC | Age: 66
End: 2021-03-15

## 2021-03-15 DIAGNOSIS — M25.561 ACUTE PAIN OF RIGHT KNEE: Primary | ICD-10-CM

## 2021-03-15 PROCEDURE — 97110 THERAPEUTIC EXERCISES: CPT | Performed by: PHYSICAL THERAPIST

## 2021-03-15 PROCEDURE — 97530 THERAPEUTIC ACTIVITIES: CPT | Performed by: PHYSICAL THERAPIST

## 2021-03-15 NOTE — PROGRESS NOTES
Physical Therapy Daily Progress Note  Visit: 3    Nahum Restrepo reports: did well last visit and feels some improvement.     Subjective       Objective   See Exercise, Manual, and Modality Logs for complete treatment.       Assessment/Plan     Knee pain decreasing, ROM improving.     Plan:    Continue current         Timed:         Manual Therapy:    4     mins  59526;     Therapeutic Exercise:    31     mins  58065;     Therapeutic Activity:     10     mins  86936;         Timed Treatment:   45   mins   Total Treatment:     45   mins  Torin Freeman PT, DPT  Physical Therapist

## 2021-03-17 ENCOUNTER — TREATMENT (OUTPATIENT)
Dept: PHYSICAL THERAPY | Facility: CLINIC | Age: 66
End: 2021-03-17

## 2021-03-17 DIAGNOSIS — M25.561 ACUTE PAIN OF RIGHT KNEE: Primary | ICD-10-CM

## 2021-03-17 PROCEDURE — 97530 THERAPEUTIC ACTIVITIES: CPT | Performed by: PHYSICAL THERAPIST

## 2021-03-17 PROCEDURE — 97110 THERAPEUTIC EXERCISES: CPT | Performed by: PHYSICAL THERAPIST

## 2021-03-17 NOTE — PROGRESS NOTES
Physical Therapy Daily Progress Note      Patient: Nahum Restrepo   : 1955  Diagnosis/ICD-10 Code:  Acute pain of right knee [M25.561]  Referring practitioner: Leonardo Lujan MD  Date of Initial Visit: Type: THERAPY  Noted: 3/8/2021  Today's Date: 3/17/2021  Patient seen for 4 sessions             Subjective Pt reports no changes since last visit.    Objective   See Exercise, Manual, and Modality Logs for complete treatment.       Assessment/Plan  Good tolerance with progression of exercise reps and intensity.    Progress per Plan of Care           Timed:         Manual Therapy:   5     mins  30372;     Therapeutic Exercise:    28     mins  80825;     Therapeutic Activity:     10     mins  82086;         Timed Treatment:   43   mins   Total Treatment:     43   mins    Robert Mederos PTA  Physical Therapist Assistant

## 2021-03-24 ENCOUNTER — TELEPHONE (OUTPATIENT)
Dept: PHYSICAL THERAPY | Facility: CLINIC | Age: 66
End: 2021-03-24

## 2021-03-30 ENCOUNTER — TELEPHONE (OUTPATIENT)
Dept: FAMILY MEDICINE CLINIC | Facility: CLINIC | Age: 66
End: 2021-03-30

## 2021-03-30 DIAGNOSIS — M25.562 ACUTE PAIN OF BOTH KNEES: Primary | ICD-10-CM

## 2021-03-30 DIAGNOSIS — M25.561 ACUTE PAIN OF BOTH KNEES: Primary | ICD-10-CM

## 2021-03-30 NOTE — TELEPHONE ENCOUNTER
Caller: Nahum Restrepo    Relationship: Self    Best call back number: [phone]    What orders are you requesting (i.e. lab or imaging): MRI ON RIGHT KNEE    In what timeframe would the patient need to come in: ASAP    Where will you receive your lab/imaging services: N/A    Additional notes: PATIENT STATES HE HAS BEEN GOING TO PHYSICAL THERAPY BUT IT IS NOT HELPING

## 2021-03-30 NOTE — TELEPHONE ENCOUNTER
Margaret tell Jacky that we have requested a referral to orthopedic surgery to get their opinion about his knees.  His insurance company was not excited about an MRI so we need to see a specialist before we go down that path

## 2021-04-06 ENCOUNTER — OFFICE VISIT (OUTPATIENT)
Dept: ORTHOPEDIC SURGERY | Facility: CLINIC | Age: 66
End: 2021-04-06

## 2021-04-06 VITALS — WEIGHT: 218.4 LBS | BODY MASS INDEX: 29.58 KG/M2 | HEIGHT: 72 IN

## 2021-04-06 DIAGNOSIS — S83.221A PERIPHERAL TEAR OF MEDIAL MENISCUS OF RIGHT KNEE AS CURRENT INJURY, INITIAL ENCOUNTER: ICD-10-CM

## 2021-04-06 DIAGNOSIS — G89.29 CHRONIC PAIN OF LEFT KNEE: Primary | ICD-10-CM

## 2021-04-06 DIAGNOSIS — M25.562 CHRONIC PAIN OF LEFT KNEE: Primary | ICD-10-CM

## 2021-04-06 PROCEDURE — 99213 OFFICE O/P EST LOW 20 MIN: CPT | Performed by: ORTHOPAEDIC SURGERY

## 2021-04-06 NOTE — PROGRESS NOTES
"Chief Complaint  Pain and Establish Care of the Right Knee    Subjective    History of Present Illness      Nahum Restrepo is a 65 y.o. male who presents to [facility] for persistent right knee pain and discomfort.    History of Present Illness   The patient is a pleasant gentleman, 65 years of age. He is a retired . He woke up one morning in 01/2021 and found that he had a very significant amount of right knee pain. The pain is located in the midline of the knee along the joint line. He has difficulty with ambulation and going up and down the steps. The patient states that he cannot squat on the ground. He was seen by his primary care physician, Dr. Lujan and was sent to physical therapy for 3 weeks, which did not improve his symptoms at all. He was then recommended to have a steroid injection which he received. It gave him very minimal relief of his symptoms. He continues to have medial joint line pain and discomfort, and he feels that there is a sense of popping and catching in his knee. He would like to get an MRI for further diagnostic possibilities and I certainly agree with him.    Pain Location: medial aspect of the  RIGHT knee  Radiation: none  Quality: sharp, stabbing  Intensity/Severity: moderate  Duration: since 01/2021  Progression of symptoms: yes, progressive worsening  Onset quality: sudden  Timing: intermittent  Aggravating Factors: squatting, deep flexion of the knee, and rising from deep flexion of the knee  Alleviating Factors: NSAIDs  Previous Episodes: yes  Associated Symptoms: pain, swelling, clicking/popping, locking  ADLs Affected: Yes with recreational activities/sports  Previous Treatment: NSAIDs and intra-articular injection with ultrasound guidance      Objective   Vital Signs:   Ht 182.9 cm (72\")   Wt 99.1 kg (218 lb 6.4 oz)   BMI 29.62 kg/m²     Physical Exam  Physical Exam  Vitals signs and nursing note reviewed.   Constitutional:      "  Appearance: Normal appearance.   Pulmonary:      Effort: Pulmonary effort is normal.   Skin:     General: Skin is warm and dry.      Capillary Refill: Capillary refill takes less than 2 seconds.   Neurological:      General: No focal deficit present.      Mental Status: He is alert and oriented to person, place, and time. Mental status is at baseline.   Psychiatric:         Mood and Affect: Mood normal.         Behavior: Behavior normal.         Thought Content: Thought content normal.         Judgment: Judgment normal.     Ortho Exam   Right knee (meniscus). The knee joint shows effusion with some thickening of the synovial membrane. There is tenderness over the meniscus. Apley's grinding test is positive over the joint line. Sukh's sign is positive with increased pain on torsional testing. There is a distinct click in mid flexion. Range of motion is from 0-120 degrees of flexion. No instability on medial or lateral testing. Anterior and posterior drawer testing is negative. Lachman test is negative. Joint line tenderness is present to direct palpation. There is some tenderness over the medial face of the tibia just distal to the joint line. The dorsalis pedis and posterior tibial artery pulses are palpable. Common peroneal nerve function is well preserved. Gait is cautious and somewhat antalgic. Full extension causes the patient to have quite a bit of pain and discomfort.             Result Review :   The following data was reviewed by: Km Felix MD on 04/06/2021:    no diagnostic testing performed this visit     AP and lateral views of the bilateral knees were reviewed. These x-rays show that the joint space is fairly well preserved. There is some irregularity of the articular surface of the patella, but it is very minimal. A small amount of suprapatellar fluid is noted to be present as well.        Procedures           Assessment   Assessment and Plan    Diagnoses and all orders for this visit:    1.  Chronic pain of left knee (Primary)  -     MRI Knee Left Without Contrast; Future    2. Peripheral tear of medial meniscus of right knee as current injury, initial encounter          Follow Up   · Schedule an MRI of the knee for evaluation of medial meniscus tear.   · If he has a partial thickness tear of the medial collateral ligament or a non-propagating partial thickness tear of the medial meniscus, he can be treated conservatively and nonoperatively. On the other hand, if he does have a full thickness tear of the meniscus, he might be a candidate for arthroscopic surgery with a partial medial meniscectomy. The patient understands that at this point he really does not require a knee arthroplasty and that I would like to be as conservative with his management as possible.  · Compression/brace to manage the pseudolaxity and instability of the knee.  · Rest, ice, compression, and elevation (RICE) therapy  · Stretching and strengthening exercises of the quads and the hamstrings.  · OTC Tylenol 500-1000mg by mouth every 6 hours as needed for pain   • Follow up in 4 week(s)for discussion of the MRI and further decision making.   • Patient was given instructions and counseling regarding his condition or for health maintenance advice. Please see specific information pulled into the AVS if appropriate.     Km Felix MD   Date of Encounter: 4/6/2021       Scribed for Km Felix MD by Marcia Pastor.  04/07/21   13:04 EDT    I have personally performed the services described in this document as scribed by the above individual, and it is both accurate and complete.  Km Felix MD  4/7/2021  13:07 EDT

## 2021-04-08 ENCOUNTER — TELEPHONE (OUTPATIENT)
Dept: ORTHOPEDIC SURGERY | Facility: CLINIC | Age: 66
End: 2021-04-08

## 2021-04-08 NOTE — TELEPHONE ENCOUNTER
PT advised that MRI Authorization would be taken care of by Hospital. PT given number to contact scheduling. PT also advised to contact office when done to set up follow up appointment to discuss MRI Results.

## 2021-04-08 NOTE — TELEPHONE ENCOUNTER
Provider: DR PERALTA   Caller: MR PUCKETT   Relationship to Patient: PATIENT     Phone Number: [phone]  Reason for Call: PATIENT CALLED IN ASKING TO SPEAK WITH THE PERSON WHO HANDLES GETTING THE AUTH'S FROM THE INS FOR HIS MRI PATIENT STATES HE SPOKE WITH HIS INSURANCE AND WANTED TO SPEAK WITH HER REGARDING SOME INFORMATION HE FOUND OUT. TRIED TO WARM TRANSFER TO NON CLINICAL AND CLINICAL NO ANSWER. PLEASE CALL PATIENT BACK @ THE ABOVE NUMBER.   When was the patient last seen: 4/6/21

## 2021-04-16 ENCOUNTER — TELEPHONE (OUTPATIENT)
Dept: FAMILY MEDICINE CLINIC | Facility: CLINIC | Age: 66
End: 2021-04-16

## 2021-04-16 NOTE — TELEPHONE ENCOUNTER
[facility] CALLED AND STATED THAT THEY REVIEWED THE REQUEST FOR PT AND WOULD LIKE TO INFORM US THAT IS IS DENIED. PATIENT NEEDS TO SHOW PROGRESS,     CALL BACK [phone]  TRACK [id number]    CLOSES AT 7 CENTRAL [place] [id number] [place]

## 2021-04-21 ENCOUNTER — HOSPITAL ENCOUNTER (OUTPATIENT)
Dept: MRI IMAGING | Facility: HOSPITAL | Age: 66
Discharge: HOME | End: 2021-04-21
Admitting: ORTHOPAEDIC SURGERY

## 2021-04-21 DIAGNOSIS — G89.29 CHRONIC PAIN OF RIGHT KNEE: ICD-10-CM

## 2021-04-21 DIAGNOSIS — M25.562 CHRONIC PAIN OF LEFT KNEE: ICD-10-CM

## 2021-04-21 DIAGNOSIS — G89.29 CHRONIC PAIN OF LEFT KNEE: ICD-10-CM

## 2021-04-21 DIAGNOSIS — M25.561 CHRONIC PAIN OF RIGHT KNEE: ICD-10-CM

## 2021-04-21 PROCEDURE — 73721 MRI JNT OF LWR EXTRE W/O DYE: CPT

## 2021-04-27 ENCOUNTER — TELEPHONE (OUTPATIENT)
Dept: ORTHOPEDIC SURGERY | Facility: CLINIC | Age: 66
End: 2021-04-27

## 2021-04-27 ENCOUNTER — OFFICE VISIT (OUTPATIENT)
Dept: ORTHOPEDIC SURGERY | Facility: CLINIC | Age: 66
End: 2021-04-27

## 2021-04-27 VITALS — BODY MASS INDEX: 28.99 KG/M2 | HEIGHT: 72 IN | WEIGHT: 214 LBS

## 2021-04-27 DIAGNOSIS — S83.221D PERIPHERAL TEAR OF MEDIAL MENISCUS OF RIGHT KNEE AS CURRENT INJURY, SUBSEQUENT ENCOUNTER: Primary | ICD-10-CM

## 2021-04-27 PROCEDURE — 99213 OFFICE O/P EST LOW 20 MIN: CPT | Performed by: ORTHOPAEDIC SURGERY

## 2021-04-27 NOTE — PROGRESS NOTES
"Chief Complaint  Results of the Right Knee    Subjective    History of Present Illness      Nahum Restrepo is a 65 y.o. male who presents to [facility] for follow-up on right knee pain and discomfort.  History of Present Illness the patient is presenting back to the office with his right knee pain and discomfort.  He states that his symptoms have gotten a whole lot better.  He does not have the clicking and locking that he had prior to the MRI.  He does have a deep-seated pain which is related more to arthritis.  We are going to discuss about viscosupplementation in the office today.  Pain Location:  RIGHT knee  Radiation: none  Quality: sharp, stabbing  Intensity/Severity: moderate  Duration: Several weeks  Progression of symptoms: no worsening, reports improvement  Onset quality: gradual   Timing: intermittent  Aggravating Factors: kneeling, squatting  Alleviating Factors: Tylenol  Previous Episodes: yes  Associated Symptoms: pain, swelling, clicking/popping  ADLs Affected: ambulating, work related activities  Previous Treatment: NSAIDs       Objective   Vital Signs:   Ht 182.9 cm (72\")   Wt 97.1 kg (214 lb)   BMI 29.02 kg/m²     Physical Exam  Physical Exam  Vitals signs and nursing note reviewed.   Constitutional:       Appearance: Normal appearance.   Pulmonary:      Effort: Pulmonary effort is normal.   Skin:     General: Skin is warm and dry.      Capillary Refill: Capillary refill takes less than 2 seconds.   Neurological:      General: No focal deficit present.      Mental Status: He is alert and oriented to person, place, and time. Mental status is at baseline.   Psychiatric:         Mood and Affect: Mood normal.         Behavior: Behavior normal.         Thought Content: Thought content normal.         Judgment: Judgment normal.     Ortho Exam   Right knee (meniscus). The knee joint shows effusion with some thickening of the synovial membrane. There is tenderness over " the meniscus. Apley's grinding test is positive over the joint line. Sukh's sign is positive with increased pain on torsional testing. There is a distinct click in mid flexion. Range of motion is from 0-120 degrees of flexion. No instability on medial or lateral testing. Anterior and posterior drawer testing is negative. Lachman test is negative. Joint line tenderness is present to direct palpation. There is some tenderness over the medial face of the tibia just distal to the joint line. The dorsalis pedis and posterior tibial artery pulses are palpable. Common peroneal nerve function is well preserved. Gait is cautious and somewhat antalgic. Full extension causes the patient to have quite a bit of pain and discomfort.           Result Review :   The following data was reviewed by: Km Felix MD on 04/27/2021:  Radiologic studies - see below for interpretation  Reviewed MRI report of right knee, performed at  [facility], [place] on 04/21/2021, summary of impression below:  MRI report performed at [facility], [place] shows that the ACL and the PCL are intact.  There is a grade 2 MCL sprain over the medial compartment.  There are full-thickness cartilaginous defects over the medial compartment.  There is high-grade fraying of the cartilage as well.  Changes are noted of the patellofemoral joint as well.  The patient also has a large displaced flap type tear of the body in the posterior horn of the medial meniscus.  There is a small Baker's cyst with partial rupture.          Procedures           Assessment   Assessment and Plan    Diagnoses and all orders for this visit:    1. Peripheral tear of medial meniscus of right knee as current injury, subsequent encounter (Primary)          Follow Up   · Compression/brace to the knee to protect the patient from pseudolaxity and to prevent instability the knee.  · Tablet meloxicam 7.5 mg tab 1 p.o. nightly for pain swelling and discomfort.  · Recent benefits of viscosupplementation  injection discussed with the patient and we will get the patient's insurance company to see if they will agree to pay for that intervention.  · At this point I am recommending nonoperative management because his symptoms from the meniscus pathology are minimal.  I do think that eventually he is going to need either a partial or total knee arthroplasty.  I discussed that with the patient at length and he understands and accepts that.  He will let me know when he is ready to proceed with that form of intervention based on his symptoms.  · Rest, ice, compression, and elevation (RICE) therapy  · Stretching and strengthening exercises of the quads and the hamstrings.  · OTC Tylenol 500-1000mg by mouth every 6 hours as needed for pain   · Follow up in 4 week(s)  • Patient was given instructions and counseling regarding his condition or for health maintenance advice. Please see specific information pulled into the AVS if appropriate.     Km Felix MD   Date of Encounter: 4/27/2021       EMR Dragon/Transcription disclaimer:  Much of this encounter note is an electronic transcription/translation of spoken language to printed text. The electronic translation of spoken language may permit erroneous, or at times, nonsensical words or phrases to be inadvertently transcribed; Although I have reviewed the note for such errors, some may still exist.

## 2021-05-18 ENCOUNTER — OFFICE VISIT (OUTPATIENT)
Dept: ORTHOPEDIC SURGERY | Facility: CLINIC | Age: 66
End: 2021-05-18

## 2021-05-18 DIAGNOSIS — M17.11 PRIMARY OSTEOARTHRITIS OF RIGHT KNEE: Primary | ICD-10-CM

## 2021-05-18 DIAGNOSIS — S83.221D PERIPHERAL TEAR OF MEDIAL MENISCUS OF RIGHT KNEE AS CURRENT INJURY, SUBSEQUENT ENCOUNTER: ICD-10-CM

## 2021-05-18 PROCEDURE — 20610 DRAIN/INJ JOINT/BURSA W/O US: CPT | Performed by: ORTHOPAEDIC SURGERY

## 2021-05-18 PROCEDURE — 99213 OFFICE O/P EST LOW 20 MIN: CPT | Performed by: ORTHOPAEDIC SURGERY

## 2021-05-18 RX ORDER — LIDOCAINE HYDROCHLORIDE 10 MG/ML
2 INJECTION, SOLUTION INFILTRATION; PERINEURAL
Status: COMPLETED | OUTPATIENT
Start: 2021-05-18 | End: 2021-05-18

## 2021-05-18 RX ADMIN — LIDOCAINE HYDROCHLORIDE 2 ML: 10 INJECTION, SOLUTION INFILTRATION; PERINEURAL at 10:49

## 2021-05-18 NOTE — PROGRESS NOTES
Orthopedic follow-up visit    Patient: Nahum Restrepo    YOB: 1955    MRN: [id number]    Chief Complaint   Patient presents with   • Right Knee - Follow-up       History of Present Illness: Patient returns today for right knee pain.  His pain is located over the medial and lateral aspect of the joint.  The pain has been progressive in nature and remains intermittent .  His pain is worsened by going up and down stairs, kneeling, rising after sitting. There has been improvement in the past with ice and rest.  The patient states that there is a sense of catching and locking on the medial aspect of the knee.  He has difficulty with walking on uneven surfaces.  Occasionally the knee will buckle and give out from underneath him.  He has a lot of pain with deep flexion of the knee.  He cannot squat on the ground because of the knee pain and discomfort.  We have discussed his MRI reports and the patient does have a meniscal tear with moderate osteoarthritis.  He is not interested in knee replacement surgery just yet.  He states that he would be willing to go through with a knee arthroscopy to manage the symptoms from the meniscal tear rather than knee replacement.    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     Allergies: No Known Allergies    Medications:   Home Medications:  Current Outpatient Medications on File Prior to Visit   Medication Sig   • alfuzosin (UROXATRAL) 10 MG 24 hr tablet Take  by mouth Daily.   • ALPRAZolam (XANAX) 0.5 MG tablet TAKE 1 TABLET BY MOUTH TWICE DAILY AS NEEDED   • amLODIPine (NORVASC) 5 MG tablet TAKE 1 TABLET DAILY   • azelastine (OPTIVAR) 0.05 % ophthalmic solution Administer 1 drop to both eyes 2 (Two) Times a Day.   • etodolac (LODINE) 400 MG tablet 1 tablet p.o. twice daily with food   • gabapentin (NEURONTIN) 100 MG capsule    • metoprolol succinate XL (TOPROL-XL) 50 MG 24 hr tablet TAKE 1 TABLET DAILY   • tadalafil (CIALIS) 5 MG tablet TAKE 1 TABLET DAILY   •  "valsartan-hydrochlorothiazide (DIOVAN-HCT) 320-25 MG per tablet TAKE 1 TABLET DAILY     No current facility-administered medications on file prior to visit.     Current Medications:  Scheduled Meds:  PRN Meds:.    I have reviewed the patient's medical history in detail and updated the computerized patient record.  Review and summarization of old records include:    Past Medical History:   Diagnosis Date   • Hypertension      Past Surgical History:   Procedure Laterality Date   • SHOULDER SURGERY Left      Social History     Occupational History   • Not on file   Tobacco Use   • Smoking status: Never Smoker   • Smokeless tobacco: Never Used   Vaping Use   • Vaping Use: Never used   Substance and Sexual Activity   • Alcohol use: Never   • Drug use: Not Currently     Types: Marijuana   • Sexual activity: Not on file      Social History     Social History Narrative   • Not on file     Family History   Problem Relation Age of Onset   • Arthritis Mother    • Osteoarthritis Mother    • Other Mother         Immobilized   • Diabetes Sister        Review of Systems   Constitutional: Negative.  Negative for fever.   HENT: Negative.    Eyes: Negative.    Respiratory: Negative.    Cardiovascular: Negative.    Endocrine: Negative.    Musculoskeletal: Positive for arthralgias, gait problem and joint swelling.   Skin: Negative.  Negative for rash and wound.   Allergic/Immunologic: Negative.    Neurological: Negative for numbness.   Hematological: Negative.    Psychiatric/Behavioral: Negative.            Wt Readings from Last 3 Encounters:   04/27/21 97.1 kg (214 lb)   04/06/21 99.1 kg (218 lb 6.4 oz)   02/17/21 98 kg (216 lb)     Ht Readings from Last 3 Encounters:   04/27/21 182.9 cm (72\")   04/06/21 182.9 cm (72\")   02/17/21 182.9 cm (72\")     There is no height or weight on file to calculate BMI.  No height and weight on file for this encounter.  There were no vitals filed for this visit.    Physical Exam  Vitals and nursing " note reviewed.   Constitutional:       Appearance: He is well-developed.   HENT:      Head: Normocephalic.      Right Ear: External ear normal.      Left Ear: External ear normal.   Eyes:      Conjunctiva/sclera: Conjunctivae normal.      Pupils: Pupils are equal, round, and reactive to light.   Cardiovascular:      Rate and Rhythm: Normal rate and regular rhythm.      Heart sounds: Normal heart sounds.   Pulmonary:      Effort: Pulmonary effort is normal.      Breath sounds: Normal breath sounds.   Abdominal:      General: Bowel sounds are normal.      Palpations: Abdomen is soft.   Musculoskeletal:      Cervical back: Normal range of motion and neck supple.   Skin:     General: Skin is warm and dry.      Capillary Refill: Capillary refill takes 2 to 3 seconds.   Neurological:      Mental Status: He is alert and oriented to person, place, and time.   Psychiatric:         Behavior: Behavior normal.         Thought Content: Thought content normal.         Judgment: Judgment normal.         Musculoskeletal:    Right knee (meniscus). The knee joint shows effusion with some thickening of the synovial membrane. There is tenderness over the meniscus. Apley's grinding test is positive over the joint line. Sukh's sign is positive with increased pain on torsional testing. There is a distinct click in mid flexion. Range of motion is from 0-115 degrees of flexion. No instability on medial or lateral testing. Anterior and posterior drawer testing is negative. Lachman test is negative. Joint line tenderness is present to direct palpation. There is some tenderness over the medial face of the tibia just distal to the joint line. The dorsalis pedis and posterior tibial artery pulses are palpable. Common peroneal nerve function is well preserved. Gait is cautious and somewhat antalgic. Full extension causes the patient to have quite a bit of pain and discomfort.   He does have some pseudolaxity of the knee on medial lateral  testing.      Diagnostics:  MRI reports are obtained: MRI of the right knee images available in the office.  There is a displaced flap tear of the medial meniscus.  The patient does have moderately advanced osteoarthritis of the medial compartment of the knee.  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    Procedure:  Large Joint Arthrocentesis: R knee  Date/Time: 5/18/2021 10:49 AM  Consent given by: patient  Site marked: site marked  Timeout: Immediately prior to procedure a time out was called to verify the correct patient, procedure, equipment, support staff and site/side marked as required   Supporting Documentation  Indications: pain   Procedure Details  Location: knee - R knee  Preparation: Patient was prepped and draped in the usual sterile fashion  Needle size: 25 G  Approach: anteromedial  Medications administered: 2 mL lidocaine 1 %; 88 mg Hyaluronan 88 MG/4ML  Patient tolerance: patient tolerated the procedure well with no immediate complications            Assessment:   Diagnoses and all orders for this visit:    1. Primary osteoarthritis of right knee (Primary)    Other orders  -     Large Joint Arthrocentesis: R knee          Plan:   · Injected patient's right knee joint(s)with Monovisc from an anteromedial approach   · Compression/brace to manage the pseudolaxity of the knee.  · Calcium and vitamin D for bone health.  · Glucosamine, chondroitin and turmeric for cartilage health.  · The patient states he will wait to see the effect of the viscosupplementation injection and then let me know how he would like to proceed.  I do think that a knee arthroscopy would be a reasonable option for this patient because his plain films do not reveal that the arthritis is advanced enough to warrant knee replacement surgery.  · Risks of surgical procedure, possibility of infection, DVT, continued pain, continued progression of arthritis, use of allograft tissue, limited range of motion and  strength, further surgical intervention, discussed with the patient.  Patient understands that arthroscopy will benefit mechanical symptoms of pain and instability but may not help with symptoms of arthritis.  · Rest, ice, compression, and elevation (RICE) therapy  · OTC Alternate Ibuprofen and Tylenol as needed  · Follow up in 6 month(s)    Date of encounter: 05/18/2021   Km Felix MD

## 2021-06-01 ENCOUNTER — PREP FOR SURGERY (OUTPATIENT)
Dept: OTHER | Facility: HOSPITAL | Age: 66
End: 2021-06-01

## 2021-06-01 ENCOUNTER — TELEPHONE (OUTPATIENT)
Dept: ORTHOPEDIC SURGERY | Facility: CLINIC | Age: 66
End: 2021-06-01

## 2021-06-01 DIAGNOSIS — S83.221D PERIPHERAL TEAR OF MEDIAL MENISCUS OF RIGHT KNEE AS CURRENT INJURY, SUBSEQUENT ENCOUNTER: Primary | ICD-10-CM

## 2021-06-01 NOTE — TELEPHONE ENCOUNTER
Patient called and stated he saw Dr. Felix on 5/18/2021 and would like to proceed with surgery on his right knee. Informed patient I would have Dr Felix put in case request and call him back to schedule once that is in.

## 2021-06-01 NOTE — TELEPHONE ENCOUNTER
Caller:  PATIENT    Reason for call:  REQUESTING A CALL BACK FROM SX - PATIENT WAS SEEN 5/18/21. Arthroscopy.      PLEASE ADVISE,     Caller# [phone]

## 2021-06-02 ENCOUNTER — TELEPHONE (OUTPATIENT)
Dept: FAMILY MEDICINE CLINIC | Facility: CLINIC | Age: 66
End: 2021-06-02

## 2021-06-03 ENCOUNTER — TELEPHONE (OUTPATIENT)
Dept: FAMILY MEDICINE CLINIC | Facility: CLINIC | Age: 66
End: 2021-06-03

## 2021-06-03 NOTE — TELEPHONE ENCOUNTER
Caller: Tiffani Puckett    Relationship: Self    Best call back number: [phone]    What is the best time to reach you: ANYTIME    Who are you requesting to speak with (clinical staff, provider,  specific staff member): CLINICAL STAFF    Do you know the name of the person who called: TIFFANI PUCKETT    What was the call regarding: PATIENT CALLED AND SAID HE MISSED A CALL FROM THE OFFICE TODAY, 06/03/21 AROUND 12:30    HE SAID THAT HIS VOICEMAIL IS NOT WORKING, A MESSAGE CANNOT BE LEFT    Do you require a callback: YES

## 2021-06-03 NOTE — TELEPHONE ENCOUNTER
Spoke to pt he is to start taking all medications at night so he does not have to miss any doses day of surgery.

## 2021-06-10 ENCOUNTER — HOSPITAL ENCOUNTER (OUTPATIENT)
Dept: CARDIOLOGY | Facility: HOSPITAL | Age: 66
Discharge: HOME | End: 2021-06-10

## 2021-06-10 ENCOUNTER — LAB (OUTPATIENT)
Dept: LAB | Facility: HOSPITAL | Age: 66
End: 2021-06-10

## 2021-06-10 DIAGNOSIS — S83.221D PERIPHERAL TEAR OF MEDIAL MENISCUS OF RIGHT KNEE AS CURRENT INJURY, SUBSEQUENT ENCOUNTER: ICD-10-CM

## 2021-06-10 DIAGNOSIS — F41.9 ANXIETY: ICD-10-CM

## 2021-06-10 LAB
ANION GAP SERPL CALCULATED.3IONS-SCNC: 13.1 MMOL/L (ref 5–15)
BASOPHILS # BLD AUTO: 0.04 10*3/MM3 (ref 0–0.2)
BASOPHILS NFR BLD AUTO: 0.5 % (ref 0–1.5)
BUN SERPL-MCNC: 19 MG/DL (ref 8–23)
BUN/CREAT SERPL: 23.8 (ref 7–25)
CALCIUM SPEC-SCNC: 9.5 MG/DL (ref 8.6–10.5)
CHLORIDE SERPL-SCNC: 102 MMOL/L (ref 98–107)
CO2 SERPL-SCNC: 24.9 MMOL/L (ref 22–29)
CREAT SERPL-MCNC: 0.8 MG/DL (ref 0.76–1.27)
DEPRECATED RDW RBC AUTO: 39.9 FL (ref 37–54)
EOSINOPHIL # BLD AUTO: 0.4 10*3/MM3 (ref 0–0.4)
EOSINOPHIL NFR BLD AUTO: 5.1 % (ref 0.3–6.2)
ERYTHROCYTE [DISTWIDTH] IN BLOOD BY AUTOMATED COUNT: 12.7 % (ref 12.3–15.4)
GFR SERPL CREATININE-BSD FRML MDRD: 97 ML/MIN/1.73
GLUCOSE SERPL-MCNC: 96 MG/DL (ref 65–99)
HCT VFR BLD AUTO: 47 % (ref 37.5–51)
HGB BLD-MCNC: 15.8 G/DL (ref 13–17.7)
IMM GRANULOCYTES # BLD AUTO: 0.1 10*3/MM3 (ref 0–0.05)
IMM GRANULOCYTES NFR BLD AUTO: 1.3 % (ref 0–0.5)
LYMPHOCYTES # BLD AUTO: 1.78 10*3/MM3 (ref 0.7–3.1)
LYMPHOCYTES NFR BLD AUTO: 22.9 % (ref 19.6–45.3)
MCH RBC QN AUTO: 29.1 PG (ref 26.6–33)
MCHC RBC AUTO-ENTMCNC: 33.6 G/DL (ref 31.5–35.7)
MCV RBC AUTO: 86.6 FL (ref 79–97)
MONOCYTES # BLD AUTO: 0.79 10*3/MM3 (ref 0.1–0.9)
MONOCYTES NFR BLD AUTO: 10.2 % (ref 5–12)
NEUTROPHILS NFR BLD AUTO: 4.66 10*3/MM3 (ref 1.7–7)
NEUTROPHILS NFR BLD AUTO: 60 % (ref 42.7–76)
NRBC BLD AUTO-RTO: 0 /100 WBC (ref 0–0.2)
PLATELET # BLD AUTO: 215 10*3/MM3 (ref 140–450)
PMV BLD AUTO: 9.9 FL (ref 6–12)
POTASSIUM SERPL-SCNC: 3.9 MMOL/L (ref 3.5–5.2)
RBC # BLD AUTO: 5.43 10*6/MM3 (ref 4.14–5.8)
SODIUM SERPL-SCNC: 140 MMOL/L (ref 136–145)
WBC # BLD AUTO: 7.77 10*3/MM3 (ref 3.4–10.8)

## 2021-06-10 PROCEDURE — 93005 ELECTROCARDIOGRAM TRACING: CPT | Performed by: ORTHOPAEDIC SURGERY

## 2021-06-10 PROCEDURE — 36415 COLL VENOUS BLD VENIPUNCTURE: CPT

## 2021-06-10 PROCEDURE — 93010 ELECTROCARDIOGRAM REPORT: CPT | Performed by: INTERNAL MEDICINE

## 2021-06-10 PROCEDURE — 80048 BASIC METABOLIC PNL TOTAL CA: CPT

## 2021-06-10 PROCEDURE — 85025 COMPLETE CBC W/AUTO DIFF WBC: CPT

## 2021-06-11 LAB — QT INTERVAL: 391 MS

## 2021-06-11 RX ORDER — ALPRAZOLAM 0.5 MG/1
TABLET ORAL
Qty: 60 TABLET | Refills: 3 | Status: SHIPPED | OUTPATIENT
Start: 2021-06-11 | End: 2021-12-12

## 2021-06-14 ENCOUNTER — LAB (OUTPATIENT)
Dept: LAB | Facility: HOSPITAL | Age: 66
End: 2021-06-14

## 2021-06-14 LAB — SARS-COV-2 ORF1AB RESP QL NAA+PROBE: NOT DETECTED

## 2021-06-14 PROCEDURE — C9803 HOPD COVID-19 SPEC COLLECT: HCPCS

## 2021-06-14 PROCEDURE — U0004 COV-19 TEST NON-CDC HGH THRU: HCPCS

## 2021-06-15 ENCOUNTER — ANESTHESIA EVENT (OUTPATIENT)
Dept: PERIOP | Facility: HOSPITAL | Age: 66
End: 2021-06-15

## 2021-06-16 ENCOUNTER — HOSPITAL ENCOUNTER (OUTPATIENT)
Facility: HOSPITAL | Age: 66
Setting detail: HOSPITAL OUTPATIENT SURGERY
Discharge: HOME | End: 2021-06-16
Attending: ORTHOPAEDIC SURGERY | Admitting: ORTHOPAEDIC SURGERY

## 2021-06-16 ENCOUNTER — ANESTHESIA (OUTPATIENT)
Dept: PERIOP | Facility: HOSPITAL | Age: 66
End: 2021-06-16

## 2021-06-16 VITALS
WEIGHT: 213.41 LBS | HEART RATE: 70 BPM | BODY MASS INDEX: 28.91 KG/M2 | HEIGHT: 72 IN | OXYGEN SATURATION: 100 % | SYSTOLIC BLOOD PRESSURE: 134 MMHG | RESPIRATION RATE: 14 BRPM | TEMPERATURE: 97 F | DIASTOLIC BLOOD PRESSURE: 86 MMHG

## 2021-06-16 DIAGNOSIS — S83.221D PERIPHERAL TEAR OF MEDIAL MENISCUS OF RIGHT KNEE AS CURRENT INJURY, SUBSEQUENT ENCOUNTER: ICD-10-CM

## 2021-06-16 PROCEDURE — 25010000002 KETOROLAC TROMETHAMINE PER 15 MG: Performed by: STUDENT IN AN ORGANIZED HEALTH CARE EDUCATION/TRAINING PROGRAM

## 2021-06-16 PROCEDURE — 25010000002 DEXAMETHASONE PER 1 MG: Performed by: STUDENT IN AN ORGANIZED HEALTH CARE EDUCATION/TRAINING PROGRAM

## 2021-06-16 PROCEDURE — C9290 INJ, BUPIVACAINE LIPOSOME: HCPCS | Performed by: ORTHOPAEDIC SURGERY

## 2021-06-16 PROCEDURE — 25010000002 ONDANSETRON PER 1 MG: Performed by: STUDENT IN AN ORGANIZED HEALTH CARE EDUCATION/TRAINING PROGRAM

## 2021-06-16 PROCEDURE — 25010000002 PROPOFOL 10 MG/ML EMULSION: Performed by: STUDENT IN AN ORGANIZED HEALTH CARE EDUCATION/TRAINING PROGRAM

## 2021-06-16 PROCEDURE — 25010000003 BUPIVACAINE LIPOSOME 1.3 % SUSPENSION: Performed by: ORTHOPAEDIC SURGERY

## 2021-06-16 PROCEDURE — 25010000002 FENTANYL CITRATE (PF) 100 MCG/2ML SOLUTION: Performed by: STUDENT IN AN ORGANIZED HEALTH CARE EDUCATION/TRAINING PROGRAM

## 2021-06-16 PROCEDURE — 25010000002 FENTANYL CITRATE (PF) 50 MCG/ML SOLUTION: Performed by: STUDENT IN AN ORGANIZED HEALTH CARE EDUCATION/TRAINING PROGRAM

## 2021-06-16 PROCEDURE — 25010000002 MIDAZOLAM PER 1 MG: Performed by: STUDENT IN AN ORGANIZED HEALTH CARE EDUCATION/TRAINING PROGRAM

## 2021-06-16 PROCEDURE — 29881 ARTHRS KNE SRG MNISECTMY M/L: CPT | Performed by: ORTHOPAEDIC SURGERY

## 2021-06-16 PROCEDURE — S0260 H&P FOR SURGERY: HCPCS | Performed by: ORTHOPAEDIC SURGERY

## 2021-06-16 PROCEDURE — 29879 ARTHRS KNE SRG ABRASJ ARTHRP: CPT | Performed by: ORTHOPAEDIC SURGERY

## 2021-06-16 RX ORDER — ONDANSETRON 2 MG/ML
INJECTION INTRAMUSCULAR; INTRAVENOUS AS NEEDED
Status: DISCONTINUED | OUTPATIENT
Start: 2021-06-16 | End: 2021-06-16 | Stop reason: SURG

## 2021-06-16 RX ORDER — LIDOCAINE HYDROCHLORIDE 20 MG/ML
INJECTION, SOLUTION EPIDURAL; INFILTRATION; INTRACAUDAL; PERINEURAL AS NEEDED
Status: DISCONTINUED | OUTPATIENT
Start: 2021-06-16 | End: 2021-06-16 | Stop reason: SURG

## 2021-06-16 RX ORDER — PHENYLEPHRINE HCL IN 0.9% NACL 1 MG/10 ML
SYRINGE (ML) INTRAVENOUS AS NEEDED
Status: DISCONTINUED | OUTPATIENT
Start: 2021-06-16 | End: 2021-06-16 | Stop reason: SURG

## 2021-06-16 RX ORDER — PROPOFOL 10 MG/ML
VIAL (ML) INTRAVENOUS AS NEEDED
Status: DISCONTINUED | OUTPATIENT
Start: 2021-06-16 | End: 2021-06-16 | Stop reason: SURG

## 2021-06-16 RX ORDER — SODIUM CHLORIDE, SODIUM LACTATE, POTASSIUM CHLORIDE, CALCIUM CHLORIDE 600; 310; 30; 20 MG/100ML; MG/100ML; MG/100ML; MG/100ML
1000 INJECTION, SOLUTION INTRAVENOUS CONTINUOUS
Status: DISCONTINUED | OUTPATIENT
Start: 2021-06-16 | End: 2021-06-16 | Stop reason: HOSPADM

## 2021-06-16 RX ORDER — HYDROCODONE BITARTRATE AND ACETAMINOPHEN 5; 325 MG/1; MG/1
1-2 TABLET ORAL EVERY 6 HOURS PRN
Qty: 30 TABLET | Refills: 0 | Status: SHIPPED | OUTPATIENT
Start: 2021-06-16 | End: 2021-07-22

## 2021-06-16 RX ORDER — DEXAMETHASONE SODIUM PHOSPHATE 4 MG/ML
INJECTION, SOLUTION INTRA-ARTICULAR; INTRALESIONAL; INTRAMUSCULAR; INTRAVENOUS; SOFT TISSUE AS NEEDED
Status: DISCONTINUED | OUTPATIENT
Start: 2021-06-16 | End: 2021-06-16 | Stop reason: SURG

## 2021-06-16 RX ORDER — KETOROLAC TROMETHAMINE 30 MG/ML
INJECTION, SOLUTION INTRAMUSCULAR; INTRAVENOUS AS NEEDED
Status: DISCONTINUED | OUTPATIENT
Start: 2021-06-16 | End: 2021-06-16 | Stop reason: SURG

## 2021-06-16 RX ORDER — FENTANYL CITRATE 50 UG/ML
50 INJECTION, SOLUTION INTRAMUSCULAR; INTRAVENOUS
Status: DISCONTINUED | OUTPATIENT
Start: 2021-06-16 | End: 2021-06-16 | Stop reason: HOSPADM

## 2021-06-16 RX ORDER — HYDROCODONE BITARTRATE AND ACETAMINOPHEN 5; 325 MG/1; MG/1
1 TABLET ORAL ONCE AS NEEDED
Status: COMPLETED | OUTPATIENT
Start: 2021-06-16 | End: 2021-06-16

## 2021-06-16 RX ORDER — MIDAZOLAM HYDROCHLORIDE 1 MG/ML
INJECTION INTRAMUSCULAR; INTRAVENOUS AS NEEDED
Status: DISCONTINUED | OUTPATIENT
Start: 2021-06-16 | End: 2021-06-16 | Stop reason: SURG

## 2021-06-16 RX ORDER — LIDOCAINE HYDROCHLORIDE 10 MG/ML
0.5 INJECTION, SOLUTION INFILTRATION; PERINEURAL ONCE AS NEEDED
Status: DISCONTINUED | OUTPATIENT
Start: 2021-06-16 | End: 2021-06-16 | Stop reason: HOSPADM

## 2021-06-16 RX ORDER — SODIUM CHLORIDE 0.9 % (FLUSH) 0.9 %
10 SYRINGE (ML) INJECTION AS NEEDED
Status: DISCONTINUED | OUTPATIENT
Start: 2021-06-16 | End: 2021-06-16 | Stop reason: HOSPADM

## 2021-06-16 RX ORDER — ASPIRIN 81 MG/1
325 TABLET ORAL DAILY
Qty: 30 TABLET | Refills: 0 | Status: SHIPPED | OUTPATIENT
Start: 2021-06-16 | End: 2021-09-30

## 2021-06-16 RX ORDER — FENTANYL CITRATE 50 UG/ML
INJECTION, SOLUTION INTRAMUSCULAR; INTRAVENOUS AS NEEDED
Status: DISCONTINUED | OUTPATIENT
Start: 2021-06-16 | End: 2021-06-16 | Stop reason: SURG

## 2021-06-16 RX ORDER — HYDROMORPHONE HCL 110MG/55ML
0.5 PATIENT CONTROLLED ANALGESIA SYRINGE INTRAVENOUS
Status: DISCONTINUED | OUTPATIENT
Start: 2021-06-16 | End: 2021-06-16 | Stop reason: HOSPADM

## 2021-06-16 RX ADMIN — FENTANYL CITRATE 50 MCG: 50 INJECTION, SOLUTION INTRAMUSCULAR; INTRAVENOUS at 12:33

## 2021-06-16 RX ADMIN — CEFAZOLIN SODIUM 2 G: 1 INJECTION, POWDER, FOR SOLUTION INTRAMUSCULAR; INTRAVENOUS at 10:47

## 2021-06-16 RX ADMIN — DEXAMETHASONE SODIUM PHOSPHATE 4 MG: 4 INJECTION, SOLUTION INTRAMUSCULAR; INTRAVENOUS at 10:47

## 2021-06-16 RX ADMIN — FENTANYL CITRATE 50 MCG: 50 INJECTION, SOLUTION INTRAMUSCULAR; INTRAVENOUS at 10:45

## 2021-06-16 RX ADMIN — MIDAZOLAM 2 MG: 1 INJECTION INTRAMUSCULAR; INTRAVENOUS at 10:39

## 2021-06-16 RX ADMIN — Medication 100 MCG: at 11:02

## 2021-06-16 RX ADMIN — ONDANSETRON 4 MG: 2 INJECTION INTRAMUSCULAR; INTRAVENOUS at 11:43

## 2021-06-16 RX ADMIN — HYDROCODONE BITARTRATE AND ACETAMINOPHEN 1 TABLET: 5; 325 TABLET ORAL at 12:51

## 2021-06-16 RX ADMIN — PROPOFOL 170 MG: 10 INJECTION, EMULSION INTRAVENOUS at 10:42

## 2021-06-16 RX ADMIN — LIDOCAINE HYDROCHLORIDE 100 MG: 20 INJECTION, SOLUTION EPIDURAL; INFILTRATION; INTRACAUDAL; PERINEURAL at 10:41

## 2021-06-16 RX ADMIN — FENTANYL CITRATE 50 MCG: 50 INJECTION, SOLUTION INTRAMUSCULAR; INTRAVENOUS at 10:40

## 2021-06-16 RX ADMIN — KETOROLAC TROMETHAMINE 30 MG: 30 INJECTION, SOLUTION INTRAMUSCULAR at 11:43

## 2021-06-16 RX ADMIN — PROPOFOL 30 MG: 10 INJECTION, EMULSION INTRAVENOUS at 11:45

## 2021-06-16 RX ADMIN — Medication 100 MCG: at 11:00

## 2021-06-16 NOTE — ANESTHESIA PROCEDURE NOTES
Airway  Urgency: elective    Date/Time: 6/16/2021 10:43 AM  Airway not difficult    General Information and Staff    Patient location during procedure: OR  Anesthesiologist: Otto Escobar MD    Indications and Patient Condition  Indications for airway management: CNS depression    Preoxygenated: yes  MILS maintained throughout  Mask difficulty assessment: 0 - not attempted    Final Airway Details  Final airway type: supraglottic airway      Successful airway: unique  Size 4    Number of attempts at approach: 1  Assessment: lips, teeth, and gum same as pre-op and atraumatic intubation

## 2021-06-16 NOTE — OP NOTE
PATIENT NAME: Nahum Restrepo   PATIENT : 1955   DATE OF PROCEDURE: 2021   PRINCIPAL DIAGNOSIS: Peripheral tear of medial meniscus of right knee as current injury, subsequent encounter [S83.221D]  SECONDARY DIAGNOSIS: Osteochondral defect of the distal aspect of the medial femoral condyle with a degenerative tear of the medial meniscus.  POSTOPERATIVE DIAGNOSIS: Post-Op Diagnosis Codes:     * Peripheral tear of medial meniscus of right knee as current injury, subsequent encounter [S83.221D]   PROCEDURE PERFORMED: right knee arthroscopy with partial medial meniscectomy and chondroplasty of the medial femoral condyle.  SURGEON: TANYA PERALTA M.D.     ANESTHESIOLOGIST: Dr. Theo Escobar MD  ANESTHESIA USED: General with an LMA.  ANALGESIC PROCEDURE USED: 20 cc of Exparel were used intra-articularly around the portals of for postoperative analgesia.  EBL: minimal  SPECIMENS: NoneDRAINS: None  COMPLICATIONS: None  POSITION: Supine on the operating room table with leg in arthroscopic leg abel.     INDICATIONS: Patient has been having significant intraarticular issues with internal derangement of the knee. The MRI findings match the clinical findings and pain. We had tried every form of conservative nonoperative care but failed to provide the patient with adequate relief of symptoms. Accordingly arthroscopic surgery was proposed to the patient with all risks, benefits, potential complications discussed with the patient in detail. Patient understood completely that the arthroscopic surgery would help with the mechanical symptoms of catching and locking but the arthritic symptoms might continue to be unabated. The patient may eventually need additional procedures for the persistent symptoms if any. All risks of infection, DVT, pulmonary embolism, myocardial infarction, wound breakdown, limited range of motion, persistent pain, weakness of the lower extremity, limp discussed with the patient.     PROCEDURE:  Surgical timeout was called. Operative extremity was correctly identified in the operating room suite. Patient was administered antibiotics per the SCIP protocol. The knee was examined under anesthesia. Lachman test was negative. Anterior and posterior drawer signs were negative. There was no medial or lateral instability. Pivot shift sign was negative. The leg was prepped and draped and applied an arthroscopic leg abel. Tourniquet was applied after the extremity was exsanguinated. The joint was examined in a systematic fashion. Arthroscopic portals were established. The patellofemoral joint was visualized. Findings in the patellofemoral joint included grade II chondromalacia over the medial patellar facet.  This was touched with a shaver to provide a chondroplasty..      The scope was then repositioned into the medial joint space. The intraoperative findings of the medial joint space included a degenerative tear of the posterior horn of the medial meniscus.  There was also an osteochondral defect with exposed bone on the distal aspect of the medial femoral condyle.  This area of cartilage deficiency was started to the ArthroCare Bovie to prevent catching and locking.  A chondroplasty was performed of this femur.  We also used a chondral pick to drill about 5 holes into this area to provide a microfracture technique to promote healing with fibrocartilage.  Patient had an irregular degenerative tear of the posterior horn of the medial meniscus.  This was debrided with an arthroscopic biter.  The edges of the meniscus tear were touched with a shaver to smooth out the edges to prevent catching and locking.  We used a ArthroCare Bovie to touch the edges to prevent catching and locking as well.  The ACL and PCL were taut and intact with probing. The scope was then introduced into the lateral joint space. The intraoperative findings on the lateral joint space included an intact lateral meniscus with intact articular  cartilage of the distal femur and proximal tibia.  The medial and lateral gutters were carefully inspected and some loose fragments of articular cartilage were identified and removed. The plica was not found to be inflamed and did not warrant removal of the medial compartment of the joint. Arthroscopic fluid was evacuated from the joint. The portals were carefully approximated. Intraarticular analgesic, 20 cc of Exparel was injected for postoperative analgesia. Occlusive dressing was applied. Sponge, gauze and needle count was correct. No complications were encountered.  Patient was reversed from anesthetic and taken from the operating room to the recovery room in stable, satisfactory condition. I discussed the satisfactory performance of the procedure with the patient’s family and showed them the arthroscopic pictures and answered all questions for them.    Km Felix MD  6/16/2021  11:58 EDT

## 2021-06-16 NOTE — H&P
History & Physical       Patient: Nahum Restrepo    Date of Admission: 6/16/2021  8:40 AM    YOB: 1955    Medical Record Number: [id number]    Attending Physician: Km Felix MD        Chief Complaints: Peripheral tear of medial meniscus of right knee as current injury, subsequent encounter [S8.221D]      History of Present Illness: 65 y.o. male presents with Peripheral tear of medial meniscus of right knee as current injury, subsequent encounter [A74.276D]. Onset of symptoms was sudden in onset associated with an injury to the medial aspect of the knee.  Symptoms are associated with pain with clicking and locking of the knee.  Symptoms are aggravated by deep flexion of the knee and going up and down the steps.   Symptoms improve with using a supportive brace and anti-inflammatory medication. Patient is now being admitted to the services of Km Felix MD for further evaluation and treatment.      No Known Allergies      Home Medications:  Medications Prior to Admission   Medication Sig Dispense Refill Last Dose   • amLODIPine (NORVASC) 5 MG tablet TAKE 1 TABLET DAILY (Patient taking differently: Take 5 mg by mouth Daily.) 90 tablet 3    • etodolac (LODINE) 400 MG tablet 1 tablet p.o. twice daily with food (Patient taking differently: 1 tablet p.o. twice daily with food     LD 6/10) 60 tablet 3    • gabapentin (NEURONTIN) 100 MG capsule Take 300 mg by mouth Every Night.      • metoprolol succinate XL (TOPROL-XL) 50 MG 24 hr tablet TAKE 1 TABLET DAILY (Patient taking differently: Take 50 mg by mouth Daily.) 90 tablet 3    • tadalafil (CIALIS) 5 MG tablet TAKE 1 TABLET DAILY (Patient taking differently: HOLD 24 hours before surgery) 90 tablet 3    • valsartan-hydrochlorothiazide (DIOVAN-HCT) 320-25 MG per tablet TAKE 1 TABLET DAILY (Patient taking differently: Take 1 tablet by mouth Daily. HOLD 24 hours before surgery) 90 tablet 3    • alfuzosin (UROXATRAL) 10 MG 24 hr tablet Take  by mouth  Daily.      • ALPRAZolam (XANAX) 0.5 MG tablet TAKE 1 TABLET BY MOUTH TWICE DAILY AS NEEDED 60 tablet 3    • azelastine (OPTIVAR) 0.05 % ophthalmic solution Administer 1 drop to both eyes 2 (Two) Times a Day. 6 mL 0        Current Medications:  Scheduled Meds:ceFAZolin, 2 g, Intravenous, Once      Continuous Infusions:lactated ringers, 1,000 mL      PRN Meds:.lidocaine  •  sodium chloride       Past Medical History:   Diagnosis Date   • Hypertension         Past Surgical History:   Procedure Laterality Date   • SHOULDER SURGERY Left         Social History     Occupational History   • Not on file   Tobacco Use   • Smoking status: Never Smoker   • Smokeless tobacco: Never Used   Vaping Use   • Vaping Use: Never used   Substance and Sexual Activity   • Alcohol use: Yes     Comment: soc    • Drug use: Not Currently     Types: Marijuana   • Sexual activity: Not on file      Social History     Social History Narrative   • Not on file        Family History   Problem Relation Age of Onset   • Arthritis Mother    • Osteoarthritis Mother    • Other Mother         Immobilized   • Diabetes Sister          Review of Systems:   HEENT: Patient denies any headaches, vision changes, change in hearing, or tinnitus, Patient denies any rhinorrhea,epistaxis, sinus pain, mouth or dental problems, sore throat or hoarseness, or dysphagia  Pulmonary: Patient denies any cough, congestion, SOA, or wheezing  Cardiovascular: Patient denies any chest pain, dyspnea, palpitations, weakness, intolerance of exercise, varicosities, swelling of extremities, known murmur  Gastrointestinal:  Patient denies nausea, vomiting, diarrhea, constipation, loss  of appetite, change in appetite, dysphagia, gas, heartburn, melena, change in bowel habits, use of laxatives or other drugs to alter the function of the gastrointestinal tract.  Genital/Urinary: Patient denies dysuria, change in color of urine, change in frequency of urination, pain with urgency,  "incontinence, retention, or nocturia.  Musculoskeletal: Patient denies increased warmth; redness; or swelling of joints; limitation of function; deformity; crepitation: pain in a joint or an extremity, the neck, or the back, especially with movement.  Neurological: Patient denies dizziness, tremor, ataxia, difficulty in speaking, change in speech, paresthesia, loss of sensation, seizures, syncope, changes in memory.  Endocrine system: Patient denies tremors, palpitations, intolerance of heat or cold, polyuria, polydipsia, polyphagia, diaphoresis, exophthalmos, or goiter.  Psychological: Patient denies thoughts/plans or harming self or other; depression,  insomnia, night terrors, shameka, memory loss, disorientation.  Skin: Patient denies any bruising, rashes, discoloration, pruritus, wounds, ulcers, decubiti, changes in the hair or nails  Hematopoietic: Patient denies history of spontaneous or excessive bleeding, epistaxis, hematuria, melena, fatigue, enlarged or tender lymph nodes, pallor, history of anemia.    Physical Exam: 65 y.o. male  Vitals:    06/08/21 1144   Weight: 95.3 kg (210 lb)   Height: 182.9 cm (72\")       General Appearance:          Alert, cooperative, in no acute distress                                                 Head:    Normocephalic, without obvious abnormality, atraumatic   Eyes:            Lids and lashes normal, conjunctivae and sclerae normal, no   icterus, no pallor, corneas clear, PERRLA   Ears:    Ears appear intact with no abnormalities noted   Throat:   No oral lesions, no thrush, oral mucosa moist   Neck:   No adenopathy, supple, trachea midline, no thyromegaly, no   carotid bruit, no JVD   Back:     No kyphosis present, no scoliosis present, no skin lesions,      erythema or scars, no tenderness to percussion or                   palpation,   range of motion normal   Lungs:     Clear to auscultation,respirations regular, even and                  unlabored    Heart:    Regular " rhythm and normal rate, normal S1 and S2, no            murmur, no gallop, no rub, no click   Chest Wall:    No abnormalities observed   Abdomen:     Normal bowel sounds, no masses, no organomegaly, soft        nontender, nondistended, no guarding, no rebound                tenderness   Rectal:     Deferred   Extremities:   Tenderness over medial aspect of the right knee. Moves all extremities well, no edema,   no cyanosis, no redness   Pulses:   Pulses palpable and equal bilaterally   Skin:   No bleeding, bruising or rash   Lymph nodes:   No palpable adenopathy   Neurologic:   Cranial nerves 2 - 12 grossly intact, sensation intact, DTR       present and equal bilaterally   Right knee: The knee joint shows effusion with some thickening of the synovial membrane. There is tenderness over the meniscus. Apley’s grinding test is positive over the joint line. Sukh’s sign is positive with increased pain on torsional testing. There is a distinct click in mid flexion. Range of motion is from 0-120 degrees of flexion. No instability on medial or lateral testing. Anterior and posterior drawer testing is negative. Lachman test is negative. Joint line tenderness is present to direct palpation. There is some tenderness over the medial face of the tibia just distal to the joint line. The dorsalis pedis and posterior tibial artery pulses are palpable. Common peroneal nerve function is well preserved. Gait is cautious and somewhat antalgic. Full extension causes the patient to have quite a bit of pain and discomfort.          Diagnostic Tests:  No visits with results within 2 Day(s) from this visit.   Latest known visit with results is:   Hospital Outpatient Visit on 06/10/2021   Component Date Value Ref Range Status   • QT Interval 06/10/2021 391  ms Final     No results found.      Assessment:  Patient Active Problem List   Diagnosis   • Benign prostatic hyperplasia without urinary obstruction   • Elevated PSA   • Encounter  for general adult medical examination without abnormal findings   • Hypertension   • Peripheral neuropathy   • Rotator cuff injury, right, initial encounter   • Snoring   • Knee pain, right   • Peripheral tear of medial meniscus of right knee as current injury   • Primary osteoarthritis of right knee         Plan:  The patient voiced understanding of the risks, benefits, and alternative forms of treatment that were discussed and the patient consents to proceed with right knee arthroscopy with partial medial meniscectomy.     Risks of surgical procedure, possibility of infection, DVT, continued pain, continued progression of arthritis, use of allograft tissue, limited range of motion and strength, further surgical intervention, discussed with the patient.  Patient understands that arthroscopy will benefit mechanical symptoms of pain and instability but may not help with symptoms of arthritis.  Discharge Plan: today to home      Date: 6/16/2021    Km Felix MD      DICTATED UTILIZING DRAGON DICTATION

## 2021-06-16 NOTE — ANESTHESIA PREPROCEDURE EVALUATION
Anesthesia Evaluation     Patient summary reviewed and Nursing notes reviewed   NPO Solid Status: > 8 hours  NPO Liquid Status: > 8 hours           Airway   Mallampati: I  TM distance: >3 FB  Neck ROM: full  No difficulty expected  Dental - normal exam     Pulmonary - negative pulmonary ROS and normal exam   Cardiovascular - normal exam    (+) hypertension,       Neuro/Psych  (+) numbness,     GI/Hepatic/Renal/Endo - negative ROS     Musculoskeletal (-) negative ROS    Abdominal  - normal exam    Bowel sounds: normal.   Substance History - negative use     OB/GYN negative ob/gyn ROS         Other                        Anesthesia Plan    ASA 2     general     intravenous induction     Anesthetic plan, all risks, benefits, and alternatives have been provided, discussed and informed consent has been obtained with: patient.    Plan discussed with CRNA.

## 2021-06-18 NOTE — ANESTHESIA POSTPROCEDURE EVALUATION
Patient: Nahum Restrepo    Procedure Summary     Date: 06/16/21 Room / Location: [facility] OR  / [facility] MAIN OR    Anesthesia Start: 1038 Anesthesia Stop: 1158    Procedure: KNEE ARTHROSCOPY with chondroplasty with medial meniscectomy (Right Knee) Diagnosis:       Peripheral tear of medial meniscus of right knee as current injury, subsequent encounter      (Peripheral tear of medial meniscus of right knee as current injury, subsequent encounter [S83.349D])    Surgeons: Km Felix MD Provider: Otto Escobar MD    Anesthesia Type: general ASA Status: 2          Anesthesia Type: general    Vitals  Vitals Value Taken Time   /87 06/16/21 1255   Temp 97.4 °F (36.3 °C) 06/16/21 1255   Pulse 69 06/16/21 1255   Resp 10 06/16/21 1255   SpO2 99 % 06/16/21 1255           Post Anesthesia Care and Evaluation    Patient location during evaluation: PACU  Patient participation: complete - patient participated  Level of consciousness: awake  Pain score: 1  Pain management: adequate  Airway patency: patent  Anesthetic complications: No anesthetic complications  PONV Status: none  Cardiovascular status: acceptable  Respiratory status: acceptable  Hydration status: acceptable    Comments: Patient seen and examined postoperatively; vital signs stable; SpO2 greater than or equal to 90%; cardiopulmonary status stable; nausea/vomiting adequately controlled; pain adequately controlled; no apparent anesthesia complications; patient discharged from anesthesia care when discharge criteria were met

## 2021-06-18 NOTE — ANESTHESIA POSTPROCEDURE EVALUATION
Patient: Nahum Restrepo    Procedure Summary     Date: 06/16/21 Room / Location: [facility] OR 12 / [facility] MAIN OR    Anesthesia Start: 1038 Anesthesia Stop: 1158    Procedure: KNEE ARTHROSCOPY with chondroplasty with medial meniscectomy (Right Knee) Diagnosis:       Peripheral tear of medial meniscus of right knee as current injury, subsequent encounter      (Peripheral tear of medial meniscus of right knee as current injury, subsequent encounter [J87.032D])    Surgeons: Km Felix MD Provider: Otto Escobar MD    Anesthesia Type: general ASA Status: 2          Anesthesia Type: general    Vitals  Vitals Value Taken Time   /87 06/16/21 1255   Temp 97.4 °F (36.3 °C) 06/16/21 1255   Pulse 69 06/16/21 1255   Resp 10 06/16/21 1255   SpO2 99 % 06/16/21 1255           Anesthesia Post Evaluation

## 2021-06-24 ENCOUNTER — OFFICE VISIT (OUTPATIENT)
Dept: ORTHOPEDIC SURGERY | Facility: CLINIC | Age: 66
End: 2021-06-24

## 2021-06-24 VITALS
BODY MASS INDEX: 28.58 KG/M2 | HEIGHT: 72 IN | SYSTOLIC BLOOD PRESSURE: 151 MMHG | DIASTOLIC BLOOD PRESSURE: 88 MMHG | HEART RATE: 70 BPM | WEIGHT: 211 LBS

## 2021-06-24 DIAGNOSIS — S83.221D PERIPHERAL TEAR OF MEDIAL MENISCUS OF RIGHT KNEE AS CURRENT INJURY, SUBSEQUENT ENCOUNTER: Primary | ICD-10-CM

## 2021-06-24 PROCEDURE — 99024 POSTOP FOLLOW-UP VISIT: CPT | Performed by: PHYSICIAN ASSISTANT

## 2021-06-24 NOTE — PROGRESS NOTES
ORTHO POSTOP VISIT       Subjective:    HPI:  Nahum Restrepo is a 65 y.o. male who presents about 1 week out from having a right knee arthroscopy.  He reports he is doing well with mild intermittent discomfort.  He does report a pinpoint rash that has developed on his lower extremities.    Medications:    Current Outpatient Medications:   •  alfuzosin (UROXATRAL) 10 MG 24 hr tablet, Take  by mouth Daily., Disp: , Rfl:   •  ALPRAZolam (XANAX) 0.5 MG tablet, TAKE 1 TABLET BY MOUTH TWICE DAILY AS NEEDED, Disp: 60 tablet, Rfl: 3  •  amLODIPine (NORVASC) 5 MG tablet, TAKE 1 TABLET DAILY (Patient taking differently: Take 5 mg by mouth Daily.), Disp: 90 tablet, Rfl: 3  •  aspirin 81 MG EC tablet, Take 4 tablets by mouth Daily., Disp: 30 tablet, Rfl: 0  •  azelastine (OPTIVAR) 0.05 % ophthalmic solution, Administer 1 drop to both eyes 2 (Two) Times a Day., Disp: 6 mL, Rfl: 0  •  etodolac (LODINE) 400 MG tablet, 1 tablet p.o. twice daily with food (Patient taking differently: 1 tablet p.o. twice daily with food     LD 6/10), Disp: 60 tablet, Rfl: 3  •  gabapentin (NEURONTIN) 100 MG capsule, Take 300 mg by mouth Every Night., Disp: , Rfl:   •  HYDROcodone-acetaminophen (NORCO) 5-325 MG per tablet, Take 1-2 tablets by mouth Every 6 (Six) Hours As Needed for Moderate Pain ., Disp: 30 tablet, Rfl: 0  •  metoprolol succinate XL (TOPROL-XL) 50 MG 24 hr tablet, TAKE 1 TABLET DAILY (Patient taking differently: Take 50 mg by mouth Daily.), Disp: 90 tablet, Rfl: 3  •  valsartan-hydrochlorothiazide (DIOVAN-HCT) 320-25 MG per tablet, TAKE 1 TABLET DAILY (Patient taking differently: Take 1 tablet by mouth Daily. HOLD 24 hours before surgery), Disp: 90 tablet, Rfl: 3  Current outpatient and discharge medications have been reconciled for the patient.  Reviewed by: DELROY Khan      Allergies:  No Known Allergies       Objective   Objective:    /88 (BP Location: Right arm, Patient Position: Sitting, Cuff Size: Large  "Adult)   Pulse 70   Ht 182.9 cm (72\")   Wt 95.7 kg (211 lb)   BMI 28.62 kg/m²     Physical Examination:  Alert, oriented, overweight individual in no acute distress, ambulating with the assistance of a cane  Right lower extremity shows well-healing portal sites with no erythema, drainage, or open skin lesions. There is a mild amount of swelling. It is grossly well aligned, and the patient is neurovascularly intact distally. The knee is stable to varus and valgus stress, there is no patellar maltracking or crepitus noted, and plantar and dorsiflexion is 5/5. There is mild tenderness to palpation and with range of motion, which is about 0-95.           Imaging:  no diagnostic testing performed this visit            Assessment:  1. Peripheral tear of medial meniscus of right knee as current injury, subsequent encounter       About 1 week out from a right knee arthroscopy with partial medial meniscectomy and chondroplasty of the medial femoral condyle        Plan:  He has deferred formal physical therapy at this time.  Details of the procedure with pictures were reviewed with the patient today.  I have given him home exercises to begin and we will plan to see him back in 4 weeks.  I did remind him that he will be in the healing phase for about 3 months, in which he may have intermittent swelling and discomfort, for which she should use anti-inflammatories, ice, and elevation as needed.  Due to the rash on his lower extremities, I have asked him to discontinue the aspirin at this time.             DELROY Khan  06/24/21  10:20 EDT    EMR Dragon/Transcription disclaimer:  Much of this encounter note is an electronic transcription/translation of spoken language to printed text. The electronic translation of spoken language may permit erroneous, or at times, nonsensical words or phrases to be inadvertently transcribed; Although I have reviewed the note for such errors, some may still exist.                  "

## 2021-07-06 ENCOUNTER — TELEPHONE (OUTPATIENT)
Dept: ORTHOPEDICS | Facility: OTHER | Age: 66
End: 2021-07-06

## 2021-07-06 DIAGNOSIS — Z98.890 S/P RIGHT KNEE ARTHROSCOPY: ICD-10-CM

## 2021-07-06 DIAGNOSIS — S83.221D PERIPHERAL TEAR OF MEDIAL MENISCUS OF RIGHT KNEE AS CURRENT INJURY, SUBSEQUENT ENCOUNTER: Primary | ICD-10-CM

## 2021-07-07 PROBLEM — Z98.890 S/P RIGHT KNEE ARTHROSCOPY: Status: ACTIVE | Noted: 2021-07-07

## 2021-07-15 ENCOUNTER — TREATMENT (OUTPATIENT)
Dept: PHYSICAL THERAPY | Facility: CLINIC | Age: 66
End: 2021-07-15

## 2021-07-15 DIAGNOSIS — Z98.890 S/P RIGHT KNEE ARTHROSCOPY: Primary | ICD-10-CM

## 2021-07-15 PROCEDURE — G0283 ELEC STIM OTHER THAN WOUND: HCPCS | Performed by: PHYSICAL THERAPIST

## 2021-07-15 PROCEDURE — 97162 PT EVAL MOD COMPLEX 30 MIN: CPT | Performed by: PHYSICAL THERAPIST

## 2021-07-15 NOTE — PROGRESS NOTES
Physical Therapy Initial Evaluation and Plan of Care    Patient: Nahum Restrepo   : 1955  Diagnosis/ICD-10 Code:  S/P right knee arthroscopy [Z98.890]  Referring practitioner: Km Felix MD  Date of Initial Visit: 7/15/2021  Today's Date: 7/15/2021  Patient seen for 1 sessions           Subjective Questionnaire:  Knee outcome:         Subjective Evaluation    History of Present Illness  Date of surgery: 2021  Mechanism of injury: Patient presents to physical therapy s/p right knee arthroplasty.  Patient reports continued pain in right knee, as well as popping/clicking with movement.  Reports pain with walking and going up/down stairs.  Patient reports that he does have follow up with MD next week.  Patient currently wearing knee sleeve for comfort.      Patient reports that knee has improved a little since he had arthroscopy, however pain and popping/clicking still occurs with each step.      Pain  Current pain ratin  At worst pain ratin  Quality: throbbing and dull ache  Relieving factors: rest  Aggravating factors: ambulation, squatting, stairs and lifting  Symptom course: mild improvement.    Patient Goals  Patient goals for therapy: decreased pain, decreased edema, increased motion and increased strength             Objective          Palpation     Right Tenderness of the distal semimembranosus and distal semitendinosus.     Tenderness     Right Knee   Tenderness in the medial joint line and popliteal fossa.     Active Range of Motion   Left Knee   Flexion: 136 degrees   Extension: 0 degrees     Right Knee   Flexion: 130 degrees with pain  Extension: 0 degrees     Patellar Mobility     Right Knee Hypomobile in the medial, lateral and superior patellar tendon(s).     Strength/Myotome Testing     Left Knee   Normal strength    Right Knee   Flexion: 4+  Extension: 4+  Quadriceps contraction: good    Swelling     Left Knee Girth Measurement (cm)   Joint line: 37 cm  10 cm above joint  line: 41 cm  10 cm below joint line: 36 cm    Right Knee Girth Measurement (cm)   Joint line: 39 cm  10 cm above joint line: 42 cm  10 cm below joint line: 36 cm    Ambulation     Observational Gait   Decreased right stance time and right step length.      General Comments     Knee Comments  Noted clicking with passive knee flexion (open chain). Increase in pain with valgus stress.         Assessment & Plan     Assessment  Impairments: abnormal or restricted ROM, impaired physical strength, lacks appropriate home exercise program and pain with function  Assessment details: Patient presents to physical therapy with right knee pain, s/p knee arthroscopy 4 weeks ago.  Observed increased edema at medial and lateral joint line in right knee (evidenced by circumferential measurements).  Noted mild strength deficits and AROM deficits in right knee.  Patient would benefit from physical therapy intervention in order to address these deficits so that he may complete ADL's with less pain and limitation.   Prognosis: good  Functional Limitations: walking  Goals  Plan Goals: In two weeks, patient will report at least 25% reduction in pain level.   In two weeks, patient will demonstrate equal bilateral knee flexion AROM.     In four weeks, patient will demonstrate 5/5 muscular strength in BLE's.   In four weeks, patient will demonstrate at least 50% improvement in gait mechanics.  In four weeks, patient will demonstrate proper technique with HEP.   In four weeks, patient will demonstrate decreased perceived disability by increasing score on knee outcome survey by at least 15%      Plan  Therapy options: will be seen for skilled physical therapy services  Planned modality interventions: cryotherapy, dry needling, TENS and thermotherapy (hydrocollator packs)  Planned therapy interventions: manual therapy, neuromuscular re-education, soft tissue mobilization, strengthening, stretching, therapeutic activities, joint mobilization,  gait training, home exercise program and functional ROM exercises  Duration in visits: 20  Plan details: 1-3 times per week        Manual Therapy:         mins  03846;  Therapeutic Exercise:         mins  65776;     Neuromuscular Leanna:        mins  16231;    Therapeutic Activity:          mins  36868;     Gait Training:           mins  74383;     Ultrasound:          mins  28756;    Electrical Stimulation:    10     mins  67503 ( );  Dry Needling          mins self-pay    Timed Treatment:   0   mins   Total Treatment:     45   mins    PT SIGNATURE: Jamal Torres, YOLI   DATE TREATMENT INITIATED: 7/15/2021    Initial Certification  Certification Period: 10/13/2021  I certify that the therapy services are furnished while this patient is under my care.  The services outlined above are required by this patient, and will be reviewed every 90 days.     PHYSICIAN: Km Felix MD      DATE:     Please sign and return via fax to [phone].. Thank you, [facility].

## 2021-07-21 ENCOUNTER — TELEPHONE (OUTPATIENT)
Dept: PHYSICAL THERAPY | Facility: CLINIC | Age: 66
End: 2021-07-21

## 2021-07-22 ENCOUNTER — OFFICE VISIT (OUTPATIENT)
Dept: ORTHOPEDIC SURGERY | Facility: CLINIC | Age: 66
End: 2021-07-22

## 2021-07-22 VITALS
HEIGHT: 72 IN | BODY MASS INDEX: 28.91 KG/M2 | DIASTOLIC BLOOD PRESSURE: 71 MMHG | WEIGHT: 213.4 LBS | HEART RATE: 98 BPM | SYSTOLIC BLOOD PRESSURE: 124 MMHG

## 2021-07-22 DIAGNOSIS — E66.3 OVERWEIGHT (BMI 25.0-29.9): ICD-10-CM

## 2021-07-22 DIAGNOSIS — Z98.890 S/P RIGHT KNEE ARTHROSCOPY: Primary | ICD-10-CM

## 2021-07-22 DIAGNOSIS — M17.11 PRIMARY OSTEOARTHRITIS OF RIGHT KNEE: ICD-10-CM

## 2021-07-22 PROCEDURE — 99024 POSTOP FOLLOW-UP VISIT: CPT | Performed by: PHYSICIAN ASSISTANT

## 2021-07-22 RX ORDER — TRAMADOL HYDROCHLORIDE 50 MG/1
50 TABLET ORAL EVERY 8 HOURS PRN
Qty: 20 TABLET | Refills: 0 | Status: SHIPPED | OUTPATIENT
Start: 2021-07-22 | End: 2021-09-30

## 2021-07-22 RX ORDER — MELOXICAM 15 MG/1
15 TABLET ORAL DAILY
Qty: 30 TABLET | Refills: 11 | Status: SHIPPED | OUTPATIENT
Start: 2021-07-22 | End: 2021-09-30

## 2021-07-22 NOTE — PROGRESS NOTES
"    ORTHO POSTOP VISIT       Subjective:    HPI:  Nahum Restrepo is a 65 y.o. male who presents about 5 weeks out from having a right knee arthroscopy.  He reports he is doing well with mild intermittent discomfort.  He is currently in physical therapy and would like to know when his pain is going to resolve.  He is requesting a refill on his pain medication.    Medications:    Current Outpatient Medications:   •  alfuzosin (UROXATRAL) 10 MG 24 hr tablet, Take  by mouth Daily., Disp: , Rfl:   •  amLODIPine (NORVASC) 5 MG tablet, TAKE 1 TABLET DAILY (Patient taking differently: Take 5 mg by mouth Daily.), Disp: 90 tablet, Rfl: 3  •  aspirin 81 MG EC tablet, Take 4 tablets by mouth Daily., Disp: 30 tablet, Rfl: 0  •  azelastine (OPTIVAR) 0.05 % ophthalmic solution, Administer 1 drop to both eyes 2 (Two) Times a Day., Disp: 6 mL, Rfl: 0  •  gabapentin (NEURONTIN) 100 MG capsule, Take 300 mg by mouth Every Night., Disp: , Rfl:   •  metoprolol succinate XL (TOPROL-XL) 50 MG 24 hr tablet, TAKE 1 TABLET DAILY (Patient taking differently: Take 50 mg by mouth Daily.), Disp: 90 tablet, Rfl: 3  •  valsartan-hydrochlorothiazide (DIOVAN-HCT) 320-25 MG per tablet, TAKE 1 TABLET DAILY (Patient taking differently: Take 1 tablet by mouth Daily. HOLD 24 hours before surgery), Disp: 90 tablet, Rfl: 3  •  meloxicam (MOBIC) 15 MG tablet, Take 1 tablet by mouth Daily. Prn joint pain, Disp: 30 tablet, Rfl: 11  •  traMADol (ULTRAM) 50 MG tablet, Take 1 tablet by mouth Every 8 (Eight) Hours As Needed for Moderate Pain ., Disp: 20 tablet, Rfl: 0  Current outpatient and discharge medications have been reconciled for the patient.  Reviewed by: DELROY Khan      Allergies:  No Known Allergies       Objective   Objective:    /71 (BP Location: Left arm, Patient Position: Sitting, Cuff Size: Adult)   Pulse 98   Ht 182.9 cm (72\")   Wt 96.8 kg (213 lb 6.4 oz)   BMI 28.94 kg/m²     Physical Examination:  Alert, oriented, overweight " individual in no acute distress, ambulating unassisted  Right lower extremity shows well-healed portal sites with no erythema, drainage, or open skin lesions. There is a minimal amount of swelling. It is grossly well aligned, and the patient is neurovascularly intact distally. The knee is stable to varus and valgus stress, there is no patellar maltracking or crepitus noted, and plantar and dorsiflexion is 5/5. There is mild tenderness to palpation and with range of motion, which is about 0-130.           Imaging:  no diagnostic testing performed this visit            Assessment:  1. S/P right knee arthroscopy    2. Primary osteoarthritis of right knee    3. Overweight (BMI 25.0-29.9)       About 5 weeks out from a right knee arthroscopy with partial medial meniscectomy and chondroplasty of the medial femoral condyle        Plan:   I did again remind him that he will be in the healing phase for about 3 months, in which he may have intermittent swelling and discomfort, for which she should use anti-inflammatories, ice, and elevation as needed.  He also does have arthritis in the knee.  I will start him on meloxicam to help with his discomfort.  I will also give him a small amount of tramadol, risks and addiction properties were discussed.  He should follow-up with Dr. Felix in 3 months for recheck.             DELROY Khan  07/22/21  16:18 EDT    EMR Dragon/Transcription disclaimer:  Much of this encounter note is an electronic transcription/translation of spoken language to printed text. The electronic translation of spoken language may permit erroneous, or at times, nonsensical words or phrases to be inadvertently transcribed; Although I have reviewed the note for such errors, some may still exist.

## 2021-07-28 ENCOUNTER — TELEPHONE (OUTPATIENT)
Dept: PHYSICAL THERAPY | Facility: CLINIC | Age: 66
End: 2021-07-28

## 2021-08-04 ENCOUNTER — TREATMENT (OUTPATIENT)
Dept: PHYSICAL THERAPY | Facility: CLINIC | Age: 66
End: 2021-08-04

## 2021-08-04 DIAGNOSIS — Z98.890 S/P RIGHT KNEE ARTHROSCOPY: Primary | ICD-10-CM

## 2021-08-04 DIAGNOSIS — M25.561 ACUTE PAIN OF RIGHT KNEE: ICD-10-CM

## 2021-08-04 PROCEDURE — G0283 ELEC STIM OTHER THAN WOUND: HCPCS | Performed by: PHYSICAL THERAPIST

## 2021-08-04 NOTE — PROGRESS NOTES
Physical Therapy Daily Progress Note      Patient: Nahum Restrepo   : 1955  Diagnosis/ICD-10 Code:  S/P right knee arthroscopy [Z98.890]  Referring practitioner: Km Felix MD  Date of Initial Visit: Type: THERAPY  Noted: 7/15/2021  Today's Date: 2021  Patient seen for 2 sessions             Subjective Pt says his knee continues to hurt and that he is just wanting to try stim and ice today.    Objective   See Exercise, Manual, and Modality Logs for complete treatment.       Assessment/Plan  Tolerated modalities well.  Add exercises next visit.    Progress per Plan of Care               Un-Timed:  Electrical Stimulation:    20     mins  65868 ( );    Timed Treatment:   0   mins   Total Treatment:     20   mins    Robert Mederos PTA  Physical Therapist Assistant

## 2021-08-11 ENCOUNTER — TREATMENT (OUTPATIENT)
Dept: PHYSICAL THERAPY | Facility: CLINIC | Age: 66
End: 2021-08-11

## 2021-08-11 DIAGNOSIS — M25.561 ACUTE PAIN OF RIGHT KNEE: ICD-10-CM

## 2021-08-11 DIAGNOSIS — Z98.890 S/P RIGHT KNEE ARTHROSCOPY: Primary | ICD-10-CM

## 2021-08-11 PROCEDURE — G0283 ELEC STIM OTHER THAN WOUND: HCPCS | Performed by: PHYSICAL THERAPIST

## 2021-08-11 PROCEDURE — 97110 THERAPEUTIC EXERCISES: CPT | Performed by: PHYSICAL THERAPIST

## 2021-08-11 NOTE — PROGRESS NOTES
Physical Therapy Daily Progress Note      Patient: Nahum Restrepo   : 1955  Diagnosis/ICD-10 Code:  S/P right knee arthroscopy [Z98.890]  Referring practitioner: Km Felix MD  Date of Initial Visit: Type: THERAPY  Noted: 7/15/2021  Today's Date: 2021  Patient seen for 3 sessions             Subjective Knee is feeling better.  He has been riding his airdyne.    Objective   See Exercise, Manual, and Modality Logs for complete treatment.       Assessment/Plan  Pt seemed to tolerate addition of ROM, stretching and strengthening exercises fairly well overall.  Stim and ice tolerated well.    Progress per Plan of Care           Timed:             Therapeutic Exercise:    30     mins  85158;           Un-Timed:  Electrical Stimulation:    15     mins  77906 ( );      Timed Treatment:   30   mins   Total Treatment:     45   mins    Robert Mederos PTA  Physical Therapist Assistant

## 2021-08-13 ENCOUNTER — TREATMENT (OUTPATIENT)
Dept: PHYSICAL THERAPY | Facility: CLINIC | Age: 66
End: 2021-08-13

## 2021-08-13 DIAGNOSIS — Z98.890 S/P RIGHT KNEE ARTHROSCOPY: Primary | ICD-10-CM

## 2021-08-13 DIAGNOSIS — M25.561 ACUTE PAIN OF RIGHT KNEE: ICD-10-CM

## 2021-08-13 PROCEDURE — 97110 THERAPEUTIC EXERCISES: CPT | Performed by: PHYSICAL THERAPIST

## 2021-08-13 NOTE — PROGRESS NOTES
Physical Therapy Daily Progress Note      Patient: Nahum Restrepo   : 1955  Diagnosis/ICD-10 Code:  S/P right knee arthroscopy [Z98.890]  Referring practitioner: Km Felix MD  Date of Initial Visit: Type: THERAPY  Noted: 7/15/2021  Today's Date: 2021  Patient seen for 4 sessions             Subjective Pt reports his knee is still sore all over.    Objective   See Exercise, Manual, and Modality Logs for complete treatment.       Assessment/Plan  Responds well with exercises even decling the need for stim.  Progress to more standing exercises next visit    Progress per Plan of Care           Timed:             Therapeutic Exercise:    38     mins  99585;         Timed Treatment:   38   mins   Total Treatment:     38   mins    Robert Mederos PTA  Physical Therapist Assistant

## 2021-08-18 ENCOUNTER — TREATMENT (OUTPATIENT)
Dept: PHYSICAL THERAPY | Facility: CLINIC | Age: 66
End: 2021-08-18

## 2021-08-18 DIAGNOSIS — M25.561 ACUTE PAIN OF RIGHT KNEE: ICD-10-CM

## 2021-08-18 DIAGNOSIS — Z98.890 S/P RIGHT KNEE ARTHROSCOPY: Primary | ICD-10-CM

## 2021-08-18 PROCEDURE — 97110 THERAPEUTIC EXERCISES: CPT | Performed by: PHYSICAL THERAPIST

## 2021-08-18 PROCEDURE — 97530 THERAPEUTIC ACTIVITIES: CPT | Performed by: PHYSICAL THERAPIST

## 2021-08-18 NOTE — PROGRESS NOTES
Physical Therapy Daily Progress Note      Patient: Nahum Restrepo   : 1955  Diagnosis/ICD-10 Code:  S/P right knee arthroscopy [Z98.890]  Referring practitioner: Km Felix MD  Date of Initial Visit: Type: THERAPY  Noted: 7/15/2021  Today's Date: 2021  Patient seen for 5 sessions             Subjective Knee is feeling pretty good.    Objective   See Exercise, Manual, and Modality Logs for complete treatment.       Assessment/Plan  Tolerated progression to more standing exercises.  Reported feeling good after therapy today.    Progress per Plan of Care           Timed:             Therapeutic Exercise:    20    mins  71971;       Therapeutic Activity:     10     mins  19743;         Timed Treatment:   30   mins   Total Treatment:     30   mins    Robert Mederos PTA  Physical Therapist Assistant

## 2021-08-23 ENCOUNTER — TREATMENT (OUTPATIENT)
Dept: PHYSICAL THERAPY | Facility: CLINIC | Age: 66
End: 2021-08-23

## 2021-08-23 DIAGNOSIS — Z98.890 S/P RIGHT KNEE ARTHROSCOPY: Primary | ICD-10-CM

## 2021-08-23 DIAGNOSIS — M25.561 ACUTE PAIN OF RIGHT KNEE: ICD-10-CM

## 2021-08-23 PROCEDURE — 97530 THERAPEUTIC ACTIVITIES: CPT | Performed by: PHYSICAL THERAPIST

## 2021-08-23 NOTE — PROGRESS NOTES
Physical Therapy Daily Progress Note    VISIT#: 6    Subjective   Nahum Restrepo reports that he is gradually doing better and that he has to leave early today    Objective     See Exercise, Manual, and Modality Logs for complete treatment.     Assessment/Plan   Pt performed greater standing exercises today with limited time due to pt having to leave early    Plan  Progress per Plan of Care and Progress strengthening /stabilization /functional activity            Timed:         Manual Therapy:         mins  92906;     Therapeutic Exercise:         mins  39359;     Neuromuscular Leanna:        mins  24028;    Therapeutic Activity:     30     mins  23307;     Gait Training:           mins  60225;     Ultrasound:          mins  70978;    Ionto                                   mins   82555  Self Care                            mins   43354    Un-Timed:  Electrical Stimulation:         mins  98642 ( );  Dry Needling          mins self-pay  Traction          mins 83909  Low Eval          Mins  65173  Mod Eval          Mins  30379  High Eval                            Mins  35433  Re-Eval                               mins  54544    Timed Treatment:   30   mins   Total Treatment:     30   mins    Vicki Argueta PT, DPT  Physical Therapist

## 2021-08-25 ENCOUNTER — TELEPHONE (OUTPATIENT)
Dept: ORTHOPEDICS | Facility: OTHER | Age: 66
End: 2021-08-25

## 2021-09-02 ENCOUNTER — TREATMENT (OUTPATIENT)
Dept: PHYSICAL THERAPY | Facility: CLINIC | Age: 66
End: 2021-09-02

## 2021-09-02 DIAGNOSIS — Z98.890 S/P RIGHT KNEE ARTHROSCOPY: Primary | ICD-10-CM

## 2021-09-02 DIAGNOSIS — M25.561 ACUTE PAIN OF RIGHT KNEE: ICD-10-CM

## 2021-09-02 PROCEDURE — G0283 ELEC STIM OTHER THAN WOUND: HCPCS | Performed by: PHYSICAL THERAPIST

## 2021-09-02 NOTE — PROGRESS NOTES
Physical Therapy Daily Progress Note      Patient: Nahum Restrepo   : 1955  Diagnosis/ICD-10 Code:  S/P right knee arthroscopy [Z98.890]  Referring practitioner: Km Felix MD  Date of Initial Visit: Type: THERAPY  Noted: 7/15/2021  Today's Date: 2021  Patient seen for 7 sessions             Subjective Pt's knee has been hurting the past couple of days and he only wants to have stim and ice today.  He is frustrated that it has been 3 months and his knee still hurts.    Objective   See Exercise, Manual, and Modality Logs for complete treatment.       Assessment/Plan  Pt responded well to stim and ice only today.  Try resuming exercises next visit.    Progress per Plan of Care                 Un-Timed:  Electrical Stimulation:    20     mins  66885 ( );      Timed Treatment:   0   mins   Total Treatment:     20   mins    Robert Mederos PTA  Physical Therapist Assistant

## 2021-09-07 ENCOUNTER — TELEPHONE (OUTPATIENT)
Dept: PHYSICAL THERAPY | Facility: CLINIC | Age: 66
End: 2021-09-07

## 2021-09-30 ENCOUNTER — OFFICE VISIT (OUTPATIENT)
Dept: ORTHOPEDIC SURGERY | Facility: CLINIC | Age: 66
End: 2021-09-30

## 2021-09-30 VITALS
WEIGHT: 212 LBS | DIASTOLIC BLOOD PRESSURE: 90 MMHG | HEART RATE: 101 BPM | BODY MASS INDEX: 28.71 KG/M2 | SYSTOLIC BLOOD PRESSURE: 168 MMHG | HEIGHT: 72 IN

## 2021-09-30 DIAGNOSIS — Z98.890 S/P RIGHT KNEE ARTHROSCOPY: ICD-10-CM

## 2021-09-30 DIAGNOSIS — E66.3 OVERWEIGHT (BMI 25.0-29.9): ICD-10-CM

## 2021-09-30 DIAGNOSIS — M17.11 PRIMARY OSTEOARTHRITIS OF RIGHT KNEE: Primary | ICD-10-CM

## 2021-09-30 PROBLEM — S83.221A PERIPHERAL TEAR OF MEDIAL MENISCUS OF RIGHT KNEE AS CURRENT INJURY: Status: RESOLVED | Noted: 2021-04-06 | Resolved: 2021-09-30

## 2021-09-30 PROCEDURE — 20610 DRAIN/INJ JOINT/BURSA W/O US: CPT | Performed by: PHYSICIAN ASSISTANT

## 2021-09-30 PROCEDURE — 99213 OFFICE O/P EST LOW 20 MIN: CPT | Performed by: PHYSICIAN ASSISTANT

## 2021-09-30 RX ORDER — LIDOCAINE HYDROCHLORIDE 10 MG/ML
2 INJECTION, SOLUTION EPIDURAL; INFILTRATION; INTRACAUDAL; PERINEURAL
Status: COMPLETED | OUTPATIENT
Start: 2021-09-30 | End: 2021-09-30

## 2021-09-30 RX ORDER — METHYLPREDNISOLONE ACETATE 80 MG/ML
160 INJECTION, SUSPENSION INTRA-ARTICULAR; INTRALESIONAL; INTRAMUSCULAR; SOFT TISSUE
Status: COMPLETED | OUTPATIENT
Start: 2021-09-30 | End: 2021-09-30

## 2021-09-30 RX ORDER — DICLOFENAC SODIUM 75 MG/1
75 TABLET, DELAYED RELEASE ORAL 2 TIMES DAILY
Qty: 60 TABLET | Refills: 0 | Status: SHIPPED | OUTPATIENT
Start: 2021-09-30 | End: 2021-10-26

## 2021-09-30 RX ADMIN — METHYLPREDNISOLONE ACETATE 160 MG: 80 INJECTION, SUSPENSION INTRA-ARTICULAR; INTRALESIONAL; INTRAMUSCULAR; SOFT TISSUE at 13:05

## 2021-09-30 RX ADMIN — LIDOCAINE HYDROCHLORIDE 2 ML: 10 INJECTION, SOLUTION EPIDURAL; INFILTRATION; INTRACAUDAL; PERINEURAL at 13:05

## 2021-09-30 NOTE — PROGRESS NOTES
ORTHO FOLLOW UP       Subjective:    HPI:   Nahum Restrepo is a 65 y.o. male who presents in follow-up for his right knee pain.  He is over 3 months out from a right knee arthroscopy and reports that he continues to have both dull, achy pain, as well as sharp, shooting pain, mainly located over the medial aspect of the knee.  He reports the pain does increase with weightbearing.  He also reports occasional popping with pain, but no other mechanical symptoms.  He also reports occasional instability.  He has previously tried meloxicam, which did not help with his discomfort.      Past Medical History:   Diagnosis Date   • Hypertension    • Primary osteoarthritis of right knee 5/18/2021   • S/P right knee arthroscopy 7/7/2021       Past Surgical History:   Procedure Laterality Date   • KNEE ARTHROSCOPY Right 6/16/2021    Procedure: KNEE ARTHROSCOPY with chondroplasty with medial meniscectomy;  Surgeon: Km Felix MD;  Location: [facility];  Service: Orthopedics;  Laterality: Right;   • SHOULDER SURGERY Left        Social History     Occupational History   • Not on file   Tobacco Use   • Smoking status: Never Smoker   • Smokeless tobacco: Never Used   Vaping Use   • Vaping Use: Never used   Substance and Sexual Activity   • Alcohol use: Yes     Comment: soc    • Drug use: Not Currently     Frequency: 4.0 times per week     Types: Marijuana   • Sexual activity: Not on file      The following portions of the patient's history were reviewed and updated as appropriate: allergies, current medications, past family history, past medical history, past social history, past surgical history and problem list.    Medications:    Current Outpatient Medications:   •  alfuzosin (UROXATRAL) 10 MG 24 hr tablet, Take  by mouth Daily., Disp: , Rfl:   •  amLODIPine (NORVASC) 5 MG tablet, TAKE 1 TABLET DAILY (Patient taking differently: Take 5 mg by mouth Daily.), Disp: 90 tablet, Rfl: 3  •  gabapentin (NEURONTIN) 100 MG  "capsule, Take 300 mg by mouth Every Night., Disp: , Rfl:   •  metoprolol succinate XL (TOPROL-XL) 50 MG 24 hr tablet, TAKE 1 TABLET DAILY (Patient taking differently: Take 50 mg by mouth Daily.), Disp: 90 tablet, Rfl: 3  •  valsartan-hydrochlorothiazide (DIOVAN-HCT) 320-25 MG per tablet, TAKE 1 TABLET DAILY (Patient taking differently: Take 1 tablet by mouth Daily. HOLD 24 hours before surgery), Disp: 90 tablet, Rfl: 3  •  diclofenac (VOLTAREN) 75 MG EC tablet, Take 1 tablet by mouth 2 (Two) Times a Day. Prn joint pain, Disp: 60 tablet, Rfl: 0    Allergies:  No Known Allergies    Review of Systems:  Gen -no fever, chills , sweats, headache   Eyes - no irritation or discharge   ENT -  no ear pain , runny nose , sore throat , difficulty swallowing   Resp - no cough , congestion , excessive expectoration   CVS - no chest pain , palpitations.   Abd - no pain , nausea , vomiting , diarrhea   Skin - no rash , lesions.   Neuro - no dizziness    Please see HPI for any other pertinent positives.  All other systems were reviewed and are negative.       Objective   Objective:    /90 (BP Location: Left arm, Patient Position: Sitting, Cuff Size: Large Adult)   Pulse 101   Ht 182.9 cm (72\")   Wt 96.2 kg (212 lb)   BMI 28.75 kg/m²     Physical Examination:  Alert, oriented, overweight individual in no acute distress, ambulating unassisted  Right lower extremity shows well-healed portal sites with no erythema, drainage, or open skin lesions. There is a minimal amount of swelling.  There is a varus malalignment present, and the patient is neurovascularly intact distally. The knee is stable to varus and valgus stress, there is no patellar maltracking or crepitus noted, and plantar and dorsiflexion is 5/5. There is no tenderness to palpation or with range of motion, which is about 0-130.         Imaging:  no diagnostic testing performed this visit            Assessment:  1. Primary osteoarthritis of right knee    2. S/P " right knee arthroscopy    3. Overweight (BMI 25.0-29.9)                 Plan:  I am recommending conservative treatment options at this time.  Intra-articular steroid injection today, risks and benefits were discussed and postinjection instructions were given.  Since he has failed meloxicam, we will try diclofenac.  Weight loss is highly recommended.  He will be fitted for hinged knee brace today to help with stability and fall prevention.  We have also discussed future use with Visco supplementation.  If he fails conservative treatment options, he may be a candidate for total knee replacement, however he did just have an arthroscopy approximately 3 months ago and may just need more healing time.  This was discussed with him in depth today.  I will plan to see him back in 6 weeks for recheck.  Natural history and expected course discussed. Questions answered.  Educational materials distributed.  Rest, ice, compression, and elevation (RICE) therapy.  NSAIDs per medication orders.  OTC analgesics as needed.  Arthrocentesis. See procedure note.  cortisone injections  viscosupplementation  physical therapy  bracing  weight loss  activtiy modification  Large Joint Arthrocentesis: R knee  Date/Time: 9/30/2021 1:05 PM  Consent given by: patient  Timeout: Immediately prior to procedure a time out was called to verify the correct patient, procedure, equipment, support staff and site/side marked as required   Supporting Documentation  Indications: pain   Procedure Details  Location: knee - R knee  Preparation: Patient was prepped and draped in the usual sterile fashion  Needle size: 25 G  Approach: anterolateral  Medications administered: 2 mL lidocaine PF 1% 1 %; 160 mg methylPREDNISolone acetate 80 MG/ML  Patient tolerance: patient tolerated the procedure well with no immediate complications      After consent was obtained and a time out was properly performed, the right knee was prepped with alcohol and chlorhexidine.  Sterile technique was used, along with a 25 gauge needle, to inject 2 cc of 1% lidocaine and 2 cc of 80 mg/mL Depo-Medrol into the knee. The patient tolerated the procedure well.             DELROY Khan  09/30/21  12:56 EDT    EMR Dragon/Transcription disclaimer:  Much of this encounter note is an electronic transcription/translation of spoken language to printed text. The electronic translation of spoken language may permit erroneous, or at times, nonsensical words or phrases to be inadvertently transcribed; Although I have reviewed the note for such errors, some may still exist.

## 2021-09-30 NOTE — PATIENT INSTRUCTIONS
Knee Injection  A knee injection is a procedure to get medicine into your knee joint to relieve the pain, swelling, and stiffness of arthritis. Your health care provider uses a needle to inject medicine, which may also help to lubricate and cushion your knee joint. You may need more than one injection.  Tell a health care provider about:  · Any allergies you have.  · All medicines you are taking, including vitamins, herbs, eye drops, creams, and over-the-counter medicines.  · Any problems you or family members have had with anesthetic medicines.  · Any blood disorders you have.  · Any surgeries you have had.  · Any medical conditions you have.  · Whether you are pregnant or may be pregnant.  What are the risks?  Generally, this is a safe procedure. However, problems may occur, including:  · Infection.  · Bleeding.  · Symptoms that get worse.  · Damage to the area around your knee.  · Allergic reaction to any of the medicines.  · Skin reactions from repeated injections.  What happens before the procedure?  · Ask your health care provider about:  ? Changing or stopping your regular medicines. This is especially important if you are taking diabetes medicines or blood thinners.  ? Taking medicines such as aspirin and ibuprofen. These medicines can thin your blood. Do not take these medicines unless your health care provider tells you to take them.  ? Taking over-the-counter medicines, vitamins, herbs, and supplements.  · Plan to have a responsible adult take you home from the hospital or clinic.  What happens during the procedure?    · You will sit or lie down in a position for your knee to be treated.  · The skin over your kneecap will be cleaned with a germ-killing soap.  · You will be given a medicine that numbs the area (local anesthetic). You may feel some stinging.  · The medicine will be injected into your knee. The needle is carefully placed between your kneecap and your knee. The medicine is injected into the  joint space.  · The needle will be removed at the end of the procedure.  · A bandage (dressing) may be placed over the injection site.  The procedure may vary among health care providers and hospitals.  What can I expect after the procedure?  · Your blood pressure, heart rate, breathing rate, and blood oxygen level will be monitored until you leave the hospital or clinic.  · You may have to move your knee through its full range of motion. This helps to get all the medicine into your joint space.  · You will be watched to make sure that you do not have a reaction to the injected medicine.  · You may feel more pain, swelling, and warmth than you did before the injection. This reaction may last about 1-2 days.  Follow these instructions at home:  Medicines  · Take over-the-counter and prescription medicines only as told by your health care provider.  · Ask your health care provider if the medicine prescribed to you requires you to avoid driving or using machinery.  · Do not take medicines such as aspirin and ibuprofen unless your health care provider tells you to take them.  Injection site care  · Follow instructions from your health care provider about:  ? How to take care of your puncture site.  ? When and how you should change your dressing.  ? When you should remove your dressing.  · Check your injection area every day for signs of infection. Check for:  ? More redness, swelling, or pain after 2 days.  ? Fluid or blood.  ? Pus or a bad smell.  ? Warmth.  Managing pain, stiffness, and swelling    · If directed, put ice on the injection area. To do this:  ? Put ice in a plastic bag.  ? Place a towel between your skin and the bag.  ? Leave the ice on for 20 minutes, 2-3 times per day.  ? Remove the ice if your skin turns bright red. This is very important. If you cannot feel pain, heat, or cold, you have a greater risk of damage to the area.  · Do not apply heat to your knee.  · Raise (elevate) the injection area  above the level of your heart while you are sitting or lying down.    General instructions  · If you were given a dressing, keep it dry until your health care provider says it can be removed. Ask your health care provider when you can start showering or bathing.  · Avoid strenuous activities for as long as directed by your health care provider. Ask your health care provider when you can return to your normal activities.  · Keep all follow-up visits. This is important. You may need more injections.  Contact a health care provider if you have:  · A fever.  · Warmth in your injection area.  · Fluid, blood, or pus coming from your injection site.  · Symptoms at your injection site that last longer than 2 days after your procedure.  Get help right away if:  · Your knee turns very red.  · Your knee becomes very swollen.  · Your knee is in severe pain.  Summary  · A knee injection is a procedure to get medicine into your knee joint to relieve the pain, swelling, and stiffness of arthritis.  · A needle is carefully placed between your kneecap and your knee to inject medicine into the joint space.  · Before the procedure, ask your health care provider about changing or stopping your regular medicines, especially if you are taking diabetes medicines or blood thinners.  · Contact your health care provider if you have any problems or questions after your procedure.  This information is not intended to replace advice given to you by your health care provider. Make sure you discuss any questions you have with your health care provider.  Document Revised: 06/02/2021 Document Reviewed: 06/02/2021  [employer] Patient Education © 2021 [employer]

## 2021-10-25 DIAGNOSIS — M17.11 PRIMARY OSTEOARTHRITIS OF RIGHT KNEE: ICD-10-CM

## 2021-10-26 RX ORDER — DICLOFENAC SODIUM 75 MG/1
TABLET, DELAYED RELEASE ORAL
Qty: 60 TABLET | Refills: 11 | Status: SHIPPED | OUTPATIENT
Start: 2021-10-26 | End: 2023-04-05

## 2021-11-11 ENCOUNTER — OFFICE VISIT (OUTPATIENT)
Dept: ORTHOPEDIC SURGERY | Facility: CLINIC | Age: 66
End: 2021-11-11

## 2021-11-11 VITALS
HEIGHT: 72 IN | HEART RATE: 83 BPM | BODY MASS INDEX: 28.15 KG/M2 | DIASTOLIC BLOOD PRESSURE: 75 MMHG | WEIGHT: 207.8 LBS | SYSTOLIC BLOOD PRESSURE: 134 MMHG

## 2021-11-11 DIAGNOSIS — M17.11 PRIMARY OSTEOARTHRITIS OF RIGHT KNEE: Primary | ICD-10-CM

## 2021-11-11 DIAGNOSIS — E66.3 OVERWEIGHT (BMI 25.0-29.9): ICD-10-CM

## 2021-11-11 DIAGNOSIS — Z98.890 S/P RIGHT KNEE ARTHROSCOPY: ICD-10-CM

## 2021-11-11 PROCEDURE — 99213 OFFICE O/P EST LOW 20 MIN: CPT | Performed by: PHYSICIAN ASSISTANT

## 2021-11-11 NOTE — PROGRESS NOTES
ORTHO FOLLOW UP       Subjective:    HPI:   Nahum Restrepo is a 65 y.o. male who presents in follow-up for his right knee pain with a known history of right knee DJD.  He reports that his pain has improved, although it continues to be present.  He does report that the diclofenac is working better than the meloxicam.  And the brace does seem to be helping.  He last had an intra-articular steroid injection on 9/30/2021 with a positive but inadequate response.      Past Medical History:   Diagnosis Date   • Hypertension    • Primary osteoarthritis of right knee 5/18/2021   • S/P right knee arthroscopy 7/7/2021       Past Surgical History:   Procedure Laterality Date   • KNEE ARTHROSCOPY Right 6/16/2021    Procedure: KNEE ARTHROSCOPY with chondroplasty with medial meniscectomy;  Surgeon: Km Felix MD;  Location: [facility];  Service: Orthopedics;  Laterality: Right;   • SHOULDER SURGERY Left        Social History     Occupational History   • Not on file   Tobacco Use   • Smoking status: Never Smoker   • Smokeless tobacco: Never Used   Vaping Use   • Vaping Use: Never used   Substance and Sexual Activity   • Alcohol use: Yes     Comment: soc    • Drug use: Not Currently     Frequency: 4.0 times per week     Types: Marijuana   • Sexual activity: Not on file      The following portions of the patient's history were reviewed and updated as appropriate: allergies, current medications, past family history, past medical history, past social history, past surgical history and problem list.    Medications:    Current Outpatient Medications:   •  alfuzosin (UROXATRAL) 10 MG 24 hr tablet, Take  by mouth Daily., Disp: , Rfl:   •  amLODIPine (NORVASC) 5 MG tablet, TAKE 1 TABLET DAILY (Patient taking differently: Take 5 mg by mouth Daily.), Disp: 90 tablet, Rfl: 3  •  diclofenac (VOLTAREN) 75 MG EC tablet, TAKE 1 TABLET BY MOUTH TWICE DAILY AS NEEDED FOR JOINT PAIN, Disp: 60 tablet, Rfl: 11  •  gabapentin (NEURONTIN)  "100 MG capsule, Take 300 mg by mouth Every Night., Disp: , Rfl:   •  metoprolol succinate XL (TOPROL-XL) 50 MG 24 hr tablet, TAKE 1 TABLET DAILY (Patient taking differently: Take 50 mg by mouth Daily.), Disp: 90 tablet, Rfl: 3  •  valsartan-hydrochlorothiazide (DIOVAN-HCT) 320-25 MG per tablet, TAKE 1 TABLET DAILY (Patient taking differently: Take 1 tablet by mouth Daily. HOLD 24 hours before surgery), Disp: 90 tablet, Rfl: 3    Allergies:  No Known Allergies    Review of Systems:  Gen -no fever, chills , sweats, headache   Eyes - no irritation or discharge   ENT -  no ear pain , runny nose , sore throat , difficulty swallowing   Resp - no cough , congestion , excessive expectoration   CVS - no chest pain , palpitations.   Abd - no pain , nausea , vomiting , diarrhea   Skin - no rash , lesions.   Neuro - no dizziness    Please see HPI for any other pertinent positives.  All other systems were reviewed and are negative.       Objective   Objective:    /75 (BP Location: Left arm, Patient Position: Sitting, Cuff Size: Large Adult)   Pulse 83   Ht 182.9 cm (72\")   Wt 94.3 kg (207 lb 12.8 oz)   BMI 28.18 kg/m²     Physical Examination:  Alert, oriented, overweight individual in no acute distress, ambulating unassisted  Right lower extremity shows well-healed portal sites with no erythema, drainage, or open skin lesions. There is a minimal amount of swelling.  There is a varus malalignment present, and the patient is neurovascularly intact distally. The knee is stable to varus and valgus stress, there is no patellar maltracking or crepitus noted, and plantar and dorsiflexion is 5/5. There is no tenderness to palpation or with range of motion, which is about 0-130.         Imaging:  no diagnostic testing performed this visit            Assessment:  1. Primary osteoarthritis of right knee    2. S/P right knee arthroscopy    3. Overweight (BMI 25.0-29.9)                 Plan:  We will continue conservative " treatment options at this time.  I am recommending treatment with viscosupplementation at this time.  We will attempt to get this approved by his insurance company, and he will be notified when the injection is available here in the office.  Weight loss is still highly recommended.  He will continue his diclofenac and bracing.  I have recommended low or no impact activity, however I have cautioned the patient to avoid high impact activity.  If he fails conservative treatment options, he may be a candidate for total knee replacement, however he did just have an arthroscopy approximately 5 months ago and may just need more healing time.             DELROY Khan  11/11/21  14:01 EST    EMR Dragon/Transcription disclaimer:  Much of this encounter note is an electronic transcription/translation of spoken language to printed text. The electronic translation of spoken language may permit erroneous, or at times, nonsensical words or phrases to be inadvertently transcribed; Although I have reviewed the note for such errors, some may still exist.

## 2021-11-23 ENCOUNTER — TELEPHONE (OUTPATIENT)
Dept: ORTHOPEDIC SURGERY | Facility: CLINIC | Age: 66
End: 2021-11-23

## 2021-11-23 NOTE — TELEPHONE ENCOUNTER
Provider: SAROJ TRIPATHI  Caller: TIFFANI PUCKETT  Relationship to Patient: SELF  Phone Number:[phone], LEAVE VM IF NO ANSWER.  Reason for Call: PATIENT INQUIRING ABOUT GETTING SCHEDULED FOR A RIGHT KNEE INJECTION WITH SAROJ TRIPATHI. PLEASE ADVISED.

## 2021-11-23 NOTE — TELEPHONE ENCOUNTER
Caller: TIFFANI PUCKETT    Relationship to patient: SELF    Best call back number: [phone]    Chief complaint: RIGHT KNEE    Type of visit: GEL INJECTTION    Requested date: ASAP    I

## 2021-11-24 NOTE — TELEPHONE ENCOUNTER
Caller: Nahum Restrepo    Relationship: Self    Best call back number:     What was the call regarding: THE PATIENT WAS CALLING TO SCHEDULE A GEL INJECTION WITH SAROJ TRIPATHI.    Do you require a callback: YES

## 2021-11-29 NOTE — TELEPHONE ENCOUNTER
Yes that is correct. With Cindy being out first part of January if he can see Dr. Felix for injection would be okay as well . Thank you!

## 2021-12-10 DIAGNOSIS — Z12.5 SCREENING FOR PROSTATE CANCER: Primary | ICD-10-CM

## 2021-12-10 DIAGNOSIS — I10 PRIMARY HYPERTENSION: ICD-10-CM

## 2021-12-10 DIAGNOSIS — Z13.220 SCREENING FOR HYPERLIPIDEMIA: ICD-10-CM

## 2021-12-11 DIAGNOSIS — F41.9 ANXIETY: ICD-10-CM

## 2021-12-12 RX ORDER — ALPRAZOLAM 0.5 MG/1
TABLET ORAL
Qty: 60 TABLET | Refills: 4 | Status: SHIPPED | OUTPATIENT
Start: 2021-12-12 | End: 2022-01-11

## 2021-12-15 ENCOUNTER — LAB (OUTPATIENT)
Dept: FAMILY MEDICINE CLINIC | Facility: CLINIC | Age: 66
End: 2021-12-15

## 2021-12-15 DIAGNOSIS — Z12.5 SCREENING FOR PROSTATE CANCER: ICD-10-CM

## 2021-12-15 DIAGNOSIS — I10 PRIMARY HYPERTENSION: ICD-10-CM

## 2021-12-15 DIAGNOSIS — Z13.220 SCREENING FOR HYPERLIPIDEMIA: ICD-10-CM

## 2021-12-15 LAB
ALBUMIN SERPL-MCNC: 4.2 G/DL (ref 3.5–5.2)
ALBUMIN/GLOB SERPL: 1.4 G/DL
ALP SERPL-CCNC: 90 U/L (ref 39–117)
ALT SERPL W P-5'-P-CCNC: 29 U/L (ref 1–41)
ANION GAP SERPL CALCULATED.3IONS-SCNC: 9.5 MMOL/L (ref 5–15)
AST SERPL-CCNC: 15 U/L (ref 1–40)
BASOPHILS # BLD AUTO: 0.05 10*3/MM3 (ref 0–0.2)
BASOPHILS NFR BLD AUTO: 0.7 % (ref 0–1.5)
BILIRUB SERPL-MCNC: 0.2 MG/DL (ref 0–1.2)
BUN SERPL-MCNC: 16 MG/DL (ref 8–23)
BUN/CREAT SERPL: 17.2 (ref 7–25)
CALCIUM SPEC-SCNC: 8.6 MG/DL (ref 8.6–10.5)
CHLORIDE SERPL-SCNC: 103 MMOL/L (ref 98–107)
CHOLEST SERPL-MCNC: 166 MG/DL (ref 0–200)
CO2 SERPL-SCNC: 26.5 MMOL/L (ref 22–29)
CREAT SERPL-MCNC: 0.93 MG/DL (ref 0.76–1.27)
DEPRECATED RDW RBC AUTO: 37.5 FL (ref 37–54)
EOSINOPHIL # BLD AUTO: 0.33 10*3/MM3 (ref 0–0.4)
EOSINOPHIL NFR BLD AUTO: 4.9 % (ref 0.3–6.2)
ERYTHROCYTE [DISTWIDTH] IN BLOOD BY AUTOMATED COUNT: 12.4 % (ref 12.3–15.4)
GFR SERPL CREATININE-BSD FRML MDRD: 81 ML/MIN/1.73
GLOBULIN UR ELPH-MCNC: 3 GM/DL
GLUCOSE SERPL-MCNC: 98 MG/DL (ref 65–99)
HCT VFR BLD AUTO: 40.2 % (ref 37.5–51)
HDLC SERPL-MCNC: 34 MG/DL (ref 40–60)
HGB BLD-MCNC: 14.1 G/DL (ref 13–17.7)
IMM GRANULOCYTES # BLD AUTO: 0.06 10*3/MM3 (ref 0–0.05)
IMM GRANULOCYTES NFR BLD AUTO: 0.9 % (ref 0–0.5)
LDLC SERPL CALC-MCNC: 115 MG/DL (ref 0–100)
LDLC/HDLC SERPL: 3.35 {RATIO}
LYMPHOCYTES # BLD AUTO: 1.52 10*3/MM3 (ref 0.7–3.1)
LYMPHOCYTES NFR BLD AUTO: 22.6 % (ref 19.6–45.3)
MCH RBC QN AUTO: 29.6 PG (ref 26.6–33)
MCHC RBC AUTO-ENTMCNC: 35.1 G/DL (ref 31.5–35.7)
MCV RBC AUTO: 84.3 FL (ref 79–97)
MONOCYTES # BLD AUTO: 0.67 10*3/MM3 (ref 0.1–0.9)
MONOCYTES NFR BLD AUTO: 9.9 % (ref 5–12)
NEUTROPHILS NFR BLD AUTO: 4.11 10*3/MM3 (ref 1.7–7)
NEUTROPHILS NFR BLD AUTO: 61 % (ref 42.7–76)
NRBC BLD AUTO-RTO: 0 /100 WBC (ref 0–0.2)
PLATELET # BLD AUTO: 179 10*3/MM3 (ref 140–450)
PMV BLD AUTO: 10.5 FL (ref 6–12)
POTASSIUM SERPL-SCNC: 3.8 MMOL/L (ref 3.5–5.2)
PROT SERPL-MCNC: 7.2 G/DL (ref 6–8.5)
PSA SERPL-MCNC: 7.92 NG/ML (ref 0–4)
RBC # BLD AUTO: 4.77 10*6/MM3 (ref 4.14–5.8)
SODIUM SERPL-SCNC: 139 MMOL/L (ref 136–145)
TRIGL SERPL-MCNC: 91 MG/DL (ref 0–150)
TSH SERPL DL<=0.05 MIU/L-ACNC: 2.04 UIU/ML (ref 0.27–4.2)
VLDLC SERPL-MCNC: 17 MG/DL (ref 5–40)
WBC NRBC COR # BLD: 6.74 10*3/MM3 (ref 3.4–10.8)

## 2021-12-15 PROCEDURE — 85025 COMPLETE CBC W/AUTO DIFF WBC: CPT | Performed by: FAMILY MEDICINE

## 2021-12-15 PROCEDURE — 36415 COLL VENOUS BLD VENIPUNCTURE: CPT

## 2021-12-15 PROCEDURE — 80061 LIPID PANEL: CPT | Performed by: FAMILY MEDICINE

## 2021-12-15 PROCEDURE — 84443 ASSAY THYROID STIM HORMONE: CPT | Performed by: FAMILY MEDICINE

## 2021-12-15 PROCEDURE — G0103 PSA SCREENING: HCPCS | Performed by: FAMILY MEDICINE

## 2021-12-15 PROCEDURE — 80053 COMPREHEN METABOLIC PANEL: CPT | Performed by: FAMILY MEDICINE

## 2021-12-16 DIAGNOSIS — R97.20 ELEVATED PSA: Primary | ICD-10-CM

## 2021-12-16 NOTE — PROGRESS NOTES
Tell Jacky that I think we need to go back and see Dr. Obando again the urologist.  The PSA continues to go up and it is probably just due to the fact that his prostate gland is getting larger but I think Dr. Obando needs to see him every year to make sure nothing else is changed.

## 2021-12-22 ENCOUNTER — OFFICE VISIT (OUTPATIENT)
Dept: FAMILY MEDICINE CLINIC | Facility: CLINIC | Age: 66
End: 2021-12-22

## 2021-12-22 VITALS
HEIGHT: 72 IN | DIASTOLIC BLOOD PRESSURE: 94 MMHG | BODY MASS INDEX: 28.58 KG/M2 | RESPIRATION RATE: 16 BRPM | OXYGEN SATURATION: 96 % | SYSTOLIC BLOOD PRESSURE: 154 MMHG | HEART RATE: 101 BPM | TEMPERATURE: 96.8 F | WEIGHT: 211 LBS

## 2021-12-22 DIAGNOSIS — G60.9 IDIOPATHIC PERIPHERAL NEUROPATHY: ICD-10-CM

## 2021-12-22 DIAGNOSIS — Z12.11 SCREEN FOR COLON CANCER: ICD-10-CM

## 2021-12-22 DIAGNOSIS — Z00.00 MEDICARE ANNUAL WELLNESS VISIT, SUBSEQUENT: Primary | ICD-10-CM

## 2021-12-22 DIAGNOSIS — I10 PRIMARY HYPERTENSION: ICD-10-CM

## 2021-12-22 DIAGNOSIS — R97.20 ELEVATED PSA: ICD-10-CM

## 2021-12-22 DIAGNOSIS — N40.0 BENIGN PROSTATIC HYPERPLASIA WITHOUT URINARY OBSTRUCTION: ICD-10-CM

## 2021-12-22 PROCEDURE — 99213 OFFICE O/P EST LOW 20 MIN: CPT | Performed by: FAMILY MEDICINE

## 2021-12-22 PROCEDURE — 1159F MED LIST DOCD IN RCRD: CPT | Performed by: FAMILY MEDICINE

## 2021-12-22 PROCEDURE — G0439 PPPS, SUBSEQ VISIT: HCPCS | Performed by: FAMILY MEDICINE

## 2021-12-22 PROCEDURE — 1170F FXNL STATUS ASSESSED: CPT | Performed by: FAMILY MEDICINE

## 2021-12-22 RX ORDER — VALSARTAN AND HYDROCHLOROTHIAZIDE 320; 25 MG/1; MG/1
1 TABLET, FILM COATED ORAL DAILY
Qty: 90 TABLET | Refills: 3 | Status: SHIPPED | OUTPATIENT
Start: 2021-12-22 | End: 2022-10-24

## 2021-12-22 RX ORDER — METOPROLOL SUCCINATE 50 MG/1
50 TABLET, EXTENDED RELEASE ORAL DAILY
Qty: 90 TABLET | Refills: 3 | Status: SHIPPED | OUTPATIENT
Start: 2021-12-22 | End: 2022-10-27

## 2021-12-22 RX ORDER — VALSARTAN AND HYDROCHLOROTHIAZIDE 320; 25 MG/1; MG/1
1 TABLET, FILM COATED ORAL DAILY
Qty: 90 TABLET | Refills: 3 | Status: CANCELLED | OUTPATIENT
Start: 2021-12-22

## 2021-12-22 RX ORDER — AMLODIPINE BESYLATE 5 MG/1
5 TABLET ORAL DAILY
Qty: 90 TABLET | Refills: 3 | Status: SHIPPED | OUTPATIENT
Start: 2021-12-22 | End: 2023-02-06

## 2021-12-22 NOTE — PATIENT INSTRUCTIONS
Medicare Wellness  Personal Prevention Plan of Service     Date of Office Visit:  2021  Encounter Provider:  Leonardo Lujan MD  Place of Service:  [facility]  Patient Name: Nahum Restrepo  :  1955    As part of the Medicare Wellness portion of your visit today, we are providing you with this personalized preventive plan of services (PPPS). This plan is based upon recommendations of the [place] Preventive Services Task Force (USPSTF) and the Advisory Committee on Immunization Practices (ACIP).    This lists the preventive care services that should be considered, and provides dates of when you are due. Items listed as completed are up-to-date and do not require any further intervention.    Health Maintenance   Topic Date Due   • COLORECTAL CANCER SCREENING  Never done   • TDAP/TD VACCINES (1 - Tdap) Never done   • ZOSTER VACCINE (1 of 2) Never done   • HEPATITIS C SCREENING  Never done   • ANNUAL WELLNESS VISIT  Never done   • Pneumococcal Vaccine 65+ (1 of 1 - PPSV23) Never done   • INFLUENZA VACCINE  2021   • COVID-19 Vaccine  Completed       Orders Placed This Encounter   Procedures   • Amb referral for Screening Colonoscopy     Referral Priority:   Routine     Referral Type:   Diagnostic Medical     Referral Reason:   Specialty Services Required     Number of Visits Requested:   1       Return in about 1 year (around 2022).

## 2021-12-22 NOTE — PROGRESS NOTES
The ABCs of the Annual Wellness Visit  Subsequent Medicare Wellness Visit    Chief Complaint   Patient presents with   • Medicare Wellness-subsequent   The patient has consented to being recorded using [facility].    Subjective    History of Present Illness:  Nahum Restrepo is a 66 y.o. male who presents for a Subsequent Medicare Wellness Visit.  Upon arrival to the room the patient underwent the Medicare health risk assessment.  He deals with some depression--more of a lonliness.   Neither the questions themselves or the answers that were given prompted any major concern on the part of the patient or by the medical staff that gave the assessment.  As far as the preventative care examinations and the preventative care immunizations that this patient requires they are as listed below.    The patient reports that he has had a good year, and has not contracted COVID-19. He received his COVID-19 booster yesterday. He does not get influenza vaccines, but will consider getting vaccinated. He will consider getting the shingles vaccine, as well as tetanus shot.    He is due for a colonoscopy; last one was 10 years ago.     He has had basal cell cancer removed from his eyelid. He also had an eye exam at the time of the basal cell cancer removal. He states that his eyesight is good, and he only needs to wear readers.    He saw Dr. Obando, urology, and has an elevated PSA; 3 years ago it was 5.39, then it was 7.5, and then 7.9. He reports that he had the PSA repeated 1 month ago and it was in the range of 4. He is due to see Dr. Obando again in 2 months.    He notes that has occasional depression. He lives alone, and feels lonely. He adds that he had a 15-year relationship with a woman who committed suicide 2.5 years ago. He notes he plans to join the [employer].    He reports that he had a scope performed on his knee, and has an appointment to get a gel shot in 01/2022 due to the arthritis.    He does not have a Living Will.    He notes  that he has neuropathy in his feet, and would like medication to help with the pain.     Screening tests recommended:    Colonoscopy ordered  PSA UTD  Eye-up to date      Immunization:  Influenza--does not get  Prevnar-consider later  Pneumovax--consider late  Tetanus--consider later  Shingles vaccine--consider later   Covid -- UTD             The following portions of the patient's history were reviewed and   updated as appropriate: allergies, current medications, past family history, past medical history, past social history, past surgical history and problem list.    Compared to one year ago, the patient feels his physical   health is the same.    Compared to one year ago, the patient feels his mental   health is the same.    Recent Hospitalizations:  He was not admitted to the hospital during the last year.       Current Medical Providers:  Patient Care Team:  Leonardo Lujan MD as PCP - Km Mo MD as Surgeon (Orthopedic Surgery)    Outpatient Medications Prior to Visit   Medication Sig Dispense Refill   • alfuzosin (UROXATRAL) 10 MG 24 hr tablet Take  by mouth Daily.     • ALPRAZolam (XANAX) 0.5 MG tablet TAKE 1 TABLET BY MOUTH TWICE DAILY AS NEEDED 60 tablet 4   • diclofenac (VOLTAREN) 75 MG EC tablet TAKE 1 TABLET BY MOUTH TWICE DAILY AS NEEDED FOR JOINT PAIN 60 tablet 11   • gabapentin (NEURONTIN) 100 MG capsule Take 300 mg by mouth Every Night.     • amLODIPine (NORVASC) 5 MG tablet TAKE 1 TABLET DAILY (Patient taking differently: Take 5 mg by mouth Daily.) 90 tablet 3   • metoprolol succinate XL (TOPROL-XL) 50 MG 24 hr tablet TAKE 1 TABLET DAILY (Patient taking differently: Take 50 mg by mouth Daily.) 90 tablet 3   • valsartan-hydrochlorothiazide (DIOVAN-HCT) 320-25 MG per tablet TAKE 1 TABLET DAILY (Patient taking differently: Take 1 tablet by mouth Daily. HOLD 24 hours before surgery) 90 tablet 3     No facility-administered medications prior to visit.         No opioid medication  identified on active medication list. I have reviewed chart for other potential  high risk medication/s and harmful drug interactions in the elderly.          Aspirin is not on active medication list.  Aspirin use is not indicated based on review of current medical condition/s. Risk of harm outweighs potential benefits.  .    Patient Active Problem List   Diagnosis   • Benign prostatic hyperplasia without urinary obstruction   • Elevated PSA   • Encounter for general adult medical examination without abnormal findings   • Hypertension   • Peripheral neuropathy   • Rotator cuff injury, right, initial encounter   • Snoring   • Knee pain, right   • Primary osteoarthritis of right knee   • S/P right knee arthroscopy   • Overweight (BMI 25.0-29.9)   • Medicare annual wellness visit, subsequent   • Screen for colon cancer     Advance Care Planning  Advance Directive is not on file.  ACP discussion was held with the patient during this visit. Patient does not have an advance directive, information provided.    Review of Systems   Constitutional: Negative.  Negative for fatigue and fever.   HENT: Negative.  Negative for congestion, sinus pressure, sore throat, tinnitus and trouble swallowing.    Eyes: Negative.  Negative for redness and visual disturbance.   Respiratory: Negative for cough, chest tightness, shortness of breath and wheezing.    Cardiovascular: Negative.  Negative for chest pain and leg swelling.   Gastrointestinal: Negative.  Negative for abdominal distention, abdominal pain, diarrhea and nausea.   Endocrine: Negative.  Negative for cold intolerance and heat intolerance.   Genitourinary: Negative.  Negative for difficulty urinating, dysuria and urgency.   Musculoskeletal: Negative.  Negative for arthralgias, back pain, gait problem and joint swelling.   Skin: Negative.  Negative for rash.   Allergic/Immunologic: Negative.  Negative for environmental allergies and food allergies.   Neurological: Negative.   "Negative for dizziness, weakness, light-headedness and confusion.   Hematological: Negative.  Negative for adenopathy.   Psychiatric/Behavioral: Negative.  Negative for agitation and dysphoric mood. The patient is not nervous/anxious.         Objective    Vitals:    12/22/21 0922   BP: 154/94   BP Location: Right arm   Cuff Size: Large Adult   Pulse: 101   Resp: 16   Temp: 96.8 °F (36 °C)   TempSrc: Infrared   SpO2: 96%   Weight: 95.7 kg (211 lb)   Height: 182.9 cm (72\")     BMI Readings from Last 1 Encounters:   12/22/21 28.62 kg/m²   BMI is above normal parameters. Recommendations include: exercise counseling and nutrition counseling    Does the patient have evidence of cognitive impairment? No    Physical Exam  Constitutional:       Appearance: Normal appearance. He is well-developed and normal weight.   HENT:      Head: Normocephalic and atraumatic.      Right Ear: Tympanic membrane, ear canal and external ear normal.      Left Ear: Tympanic membrane, ear canal and external ear normal.      Nose: Nose normal.      Mouth/Throat:      Mouth: Mucous membranes are moist.      Pharynx: Oropharynx is clear. No oropharyngeal exudate.   Eyes:      Extraocular Movements: Extraocular movements intact.      Conjunctiva/sclera: Conjunctivae normal.      Pupils: Pupils are equal, round, and reactive to light.   Cardiovascular:      Rate and Rhythm: Normal rate and regular rhythm.      Pulses: Normal pulses.      Heart sounds: Normal heart sounds.   Pulmonary:      Effort: Pulmonary effort is normal.      Breath sounds: Normal breath sounds.   Abdominal:      General: Bowel sounds are normal.      Palpations: Abdomen is soft.   Musculoskeletal:         General: Normal range of motion.      Cervical back: Normal range of motion and neck supple.   Skin:     General: Skin is warm and dry.   Neurological:      General: No focal deficit present.      Mental Status: He is alert and oriented to person, place, and time. Mental status " is at baseline.   Psychiatric:         Mood and Affect: Mood normal.         Behavior: Behavior normal.         Thought Content: Thought content normal.         Judgment: Judgment normal.       Lab Results   Component Value Date    TRIG 91 12/15/2021    HDL 34 (L) 12/15/2021     (H) 12/15/2021    VLDL 17 12/15/2021            HEALTH RISK ASSESSMENT    Smoking Status:  Social History     Tobacco Use   Smoking Status Never Smoker   Smokeless Tobacco Never Used     Alcohol Consumption:  Social History     Substance and Sexual Activity   Alcohol Use Yes    Comment: soc      Fall Risk Screen:    [facility] Fall Risk Assessment has not been completed.    Depression Screening:  PHQ-2/PHQ-9 Depression Screening 12/22/2021   Little interest or pleasure in doing things 0   Feeling down, depressed, or hopeless 0   Total Score 0       Health Habits and Functional and Cognitive Screening:  Functional & Cognitive Status 12/22/2021   Do you have difficulty preparing food and eating? No   Do you have difficulty bathing yourself, getting dressed or grooming yourself? No   Do you have difficulty using the toilet? No   Do you have difficulty moving around from place to place? No   Do you have trouble with steps or getting out of a bed or a chair? No   Current Diet Well Balanced Diet   Dental Exam Up to date   Eye Exam Up to date   Current Exercises Include No Regular Exercise   Do you need help using the phone?  No   Are you deaf or do you have serious difficulty hearing?  No   Do you need help with transportation? No   Do you need help shopping? No   Do you need help preparing meals?  No   Do you need help with housework?  No   Do you need help with laundry? No   Do you need help taking your medications? No   Do you need help managing money? No   Do you ever drive or ride in a car without wearing a seat belt? No   Have you felt unusual stress, anger or loneliness in the last month? No   Who do you live with? Alone   If you need  help, do you have trouble finding someone available to you? No   Do you have difficulty concentrating, remembering or making decisions? No       Age-appropriate Screening Schedule:  Refer to the list below for future screening recommendations based on patient's age, sex and/or medical conditions. Orders for these recommended tests are listed in the plan section. The patient has been provided with a written plan.    Health Maintenance   Topic Date Due   • TDAP/TD VACCINES (1 - Tdap) Never done   • ZOSTER VACCINE (1 of 2) Never done   • INFLUENZA VACCINE  08/01/2021              Assessment/Plan   CMS Preventative Services Quick Reference  Risk Factors Identified During Encounter  Immunizations Discussed/Encouraged (specific Immunizations; Td, Influenza, Pneumococcal 23 and Shingrix  Inactivity/Sedentary  The above risks/problems have been discussed with the patient.  Follow up actions/plans if indicated are seen below in the Assessment/Plan Section.  Pertinent information has been shared with the patient in the After Visit Summary.    Diagnoses and all orders for this visit:    1. Medicare annual wellness visit, subsequent (Primary)  Assessment & Plan:  Upon arrival to the room the patient underwent the Medicare health risk assessment.  He deals with some depression--more of a lonliness.   Neither the questions themselves or the answers that were given prompted any major concern on the part of the patient or by the medical staff that gave the assessment.  As far as the preventative care examinations and the preventative care immunizations that this patient requires they are as listed below.   Screening tests recommended:    Colonoscopy ordered  PSA UTD  Eye-up to date      Immunization:  Influenza--does not get  Prevnar-consider later  Pneumovax--consider late  Tetanus--consider later  Shingles vaccine--consider later   Covid -- UTD                 2. Primary hypertension  Assessment & Plan:  Patient is on valsartan  with hydrochlorothiazide and metoprolol XL 50 mg.  We will continue both of those.  He also takes amlodipine 5 mg and the 3 of these seem to control his blood pressure well.  His in office readings are a little high when I rechecked it he uses 138/88.  At home his readings are much better      3. Idiopathic peripheral neuropathy  Assessment & Plan:    - The patient is going to try Metanx. This is an over-the-counter product that he can buy; it is a vitamin.  - He will stay on the gabapentin, and increase the dose of that a little bit over a period of time.  Gabapentin will be used increasingly titrated doses until he gets some relief in his feet.  It bothers him mainly at night with a burning stinging feeling      4. Benign prostatic hyperplasia without urinary obstruction  Assessment & Plan:  Continue following PSA  - Dr. Obando is following him for this, and there is no evidence of cancer.        5. Elevated PSA  Assessment & Plan:  Yearly PSA  - His PSA is a little elevated from it, but otherwise he is not having tremendously bad symptoms. He is on Uroxatral 10 mg daily, and that does seem to help his symptoms.          6. Screen for colon cancer  Assessment & Plan:  Continue screening colonoscopies    Orders:  -     Amb referral for Screening Colonoscopy    Other orders  -     amLODIPine (NORVASC) 5 MG tablet; Take 1 tablet by mouth Daily.  Dispense: 90 tablet; Refill: 3  -     metoprolol succinate XL (TOPROL-XL) 50 MG 24 hr tablet; Take 1 tablet by mouth Daily.  Dispense: 90 tablet; Refill: 3  -     valsartan-hydrochlorothiazide (DIOVAN-HCT) 320-25 MG per tablet; Take 1 tablet by mouth Daily for 90 days.  Dispense: 90 tablet; Refill: 3            Follow Up:   Return in about 1 year (around 12/22/2022).     An After Visit Summary and PPPS were made available to the patient.          Transcribed from ambient dictation for Leonardo Lujan MD by Ilda Flowers.  12/22/21   13:01 EST    Patient verbalized consent to  the visit recording.  I have personally performed the services described in this document as transcribed by the above individual, and it is both accurate and complete.  Leonardo Lujan MD  12/25/2021  08:56 EST

## 2021-12-22 NOTE — ASSESSMENT & PLAN NOTE
Upon arrival to the room the patient underwent the Medicare health risk assessment.  He deals with some depression--more of a lonliness.   Neither the questions themselves or the answers that were given prompted any major concern on the part of the patient or by the medical staff that gave the assessment.  As far as the preventative care examinations and the preventative care immunizations that this patient requires they are as listed below.   Screening tests recommended:    Colonoscopy ordered  PSA UTD  Eye-up to date      Immunization:  Influenza--does not get  Prevnar-consider later  Pneumovax--consider late  Tetanus--consider later  Shingles vaccine--consider later   Covid -- UTD

## 2021-12-25 NOTE — ASSESSMENT & PLAN NOTE
- The patient is going to try Metanx. This is an over-the-counter product that he can buy; it is a vitamin.  - He will stay on the gabapentin, and increase the dose of that a little bit over a period of time.  Gabapentin will be used increasingly titrated doses until he gets some relief in his feet.  It bothers him mainly at night with a burning stinging feeling

## 2021-12-25 NOTE — ASSESSMENT & PLAN NOTE
Continue following PSA  - Dr. Obando is following him for this, and there is no evidence of cancer.

## 2021-12-25 NOTE — ASSESSMENT & PLAN NOTE
Yearly PSA  - His PSA is a little elevated from it, but otherwise he is not having tremendously bad symptoms. He is on Uroxatral 10 mg daily, and that does seem to help his symptoms.

## 2021-12-25 NOTE — ASSESSMENT & PLAN NOTE
Patient is on valsartan with hydrochlorothiazide and metoprolol XL 50 mg.  We will continue both of those.  He also takes amlodipine 5 mg and the 3 of these seem to control his blood pressure well.  His in office readings are a little high when I rechecked it he uses 138/88.  At home his readings are much better

## 2022-01-04 NOTE — PROGRESS NOTES
INJECTION    Patient: Nahum Restrepo    YOB: 1955    MRN: [id number]    CC: Right knee pain.    History of Present Illness: Patient returns today for right knee pain.  His pain is located over the medial and lateral aspect of the joint.  The pain has been progressive in nature and remains intermittent .  His pain is worsened by going up and down stairs, kneeling, rising after sitting, squatting. There has been improvement in the past with heat, NSAIDS and Tylenol.     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     Allergies: No Known Allergies    Medications:   Home Medications:  Current Outpatient Medications on File Prior to Visit   Medication Sig   • alfuzosin (UROXATRAL) 10 MG 24 hr tablet Take  by mouth Daily.   • ALPRAZolam (XANAX) 0.5 MG tablet TAKE 1 TABLET BY MOUTH TWICE DAILY AS NEEDED   • amLODIPine (NORVASC) 5 MG tablet Take 1 tablet by mouth Daily.   • diclofenac (VOLTAREN) 75 MG EC tablet TAKE 1 TABLET BY MOUTH TWICE DAILY AS NEEDED FOR JOINT PAIN   • gabapentin (NEURONTIN) 100 MG capsule Take 300 mg by mouth Every Night.   • metoprolol succinate XL (TOPROL-XL) 50 MG 24 hr tablet Take 1 tablet by mouth Daily.   • valsartan-hydrochlorothiazide (DIOVAN-HCT) 320-25 MG per tablet Take 1 tablet by mouth Daily for 90 days.     No current facility-administered medications on file prior to visit.     Current Medications:  Scheduled Meds:  PRN Meds:.    I have reviewed the patient's medical history in detail and updated the computerized patient record.  Review and summarization of old records include:    Past Medical History:   Diagnosis Date   • Hypertension    • Primary osteoarthritis of right knee 5/18/2021   • S/P right knee arthroscopy 7/7/2021     Past Surgical History:   Procedure Laterality Date   • KNEE ARTHROSCOPY Right 6/16/2021    Procedure: KNEE ARTHROSCOPY with chondroplasty with medial meniscectomy;  Surgeon: Km Felix MD;  Location: [facility] OR;  Service: Orthopedics;   "Laterality: Right;   • SHOULDER SURGERY Left      Social History     Occupational History   • Not on file   Tobacco Use   • Smoking status: Never Smoker   • Smokeless tobacco: Never Used   Vaping Use   • Vaping Use: Never used   Substance and Sexual Activity   • Alcohol use: Yes     Comment: soc    • Drug use: Not Currently     Frequency: 4.0 times per week     Types: Marijuana   • Sexual activity: Not on file      Social History     Social History Narrative   • Not on file     Family History   Problem Relation Age of Onset   • Arthritis Mother    • Osteoarthritis Mother    • Other Mother         Immobilized   • Diabetes Sister        Review of Systems        Wt Readings from Last 3 Encounters:   01/05/22 95.7 kg (211 lb)   12/22/21 95.7 kg (211 lb)   11/11/21 94.3 kg (207 lb 12.8 oz)     Ht Readings from Last 3 Encounters:   01/05/22 182.9 cm (72\")   12/22/21 182.9 cm (72\")   11/11/21 182.9 cm (72\")     Body mass index is 28.62 kg/m².  Facility age limit for growth percentiles is 20 years.  Vitals:    01/05/22 1339   BP: 146/87   Pulse: 105       Physical Exam    Musculoskeletal:    Right knee (varus). Patient has crepitus throughout range of motion. Positive patellar grind test. Mild effusion. Lachman is negative. Pivot shift is negative. Anterior and posterior drawer signs are negative. Significant joint line tenderness is noted on the medial aspect of the knee. Patient has a varus orientation of the knee. There is fullness and tenderness in the Popliteal fossa. Mild distention of a Popliteal cyst is noted in this location. Range of motion in flexion is from 0-100 degrees. Neurovascular status is intact.  Dorsalis pedis and posterior tibial artery pulses are palpable. Common peroneal nerve function is well preserved. Patient's gait is cautious and antalgic. Skin and soft tissues are mildly swollen, consistent with synovitis and effusion. The patient has a significant limp with the first few steps after starting " the gait cycle. Getting out of a chair takes a lot of effort due to pain on knee flexion.       Diagnostics:      Procedure:  Large Joint Arthrocentesis: R knee  Date/Time: 1/5/2022 1:45 PM  Consent given by: patient  Site marked: site marked  Timeout: Immediately prior to procedure a time out was called to verify the correct patient, procedure, equipment, support staff and site/side marked as required   Procedure Details  Location: knee - R knee  Preparation: Patient was prepped and draped in the usual sterile fashion  Needle size: 25 G  Medications administered: 2 mL lidocaine PF 1% 1 %; 80 mg triamcinolone acetonide 40 MG/ML  Patient tolerance: patient tolerated the procedure well with no immediate complications        .proc    Assessment:   Diagnoses and all orders for this visit:    1. Primary osteoarthritis of right knee (Primary)    Other orders  -     Large Joint Arthrocentesis: R knee          Plan:   · Injected patient's right knee joint(s)with Depo-Medrol from an anteromedial approach   · Compression/brace   · Rest, ice, compression, and elevation (RICE) therapy.  · Discussed with the patient about risks and benefits of Monovisc viscosupplementation injection.  The patient will let me know in about 4 to 6 weeks if he wants to proceed with that intervention.  We will go ahead and get the approval from the patient's insurance company in the meanwhile.  · OTC Tylenol 500-1000mg by mouth every 6 hours as needed for pain   · Follow up in 3 month(s)    Date of encounter: 01/05/2022   Km Felix MD

## 2022-01-05 ENCOUNTER — OFFICE VISIT (OUTPATIENT)
Dept: ORTHOPEDIC SURGERY | Facility: CLINIC | Age: 67
End: 2022-01-05

## 2022-01-05 VITALS
DIASTOLIC BLOOD PRESSURE: 87 MMHG | BODY MASS INDEX: 28.58 KG/M2 | SYSTOLIC BLOOD PRESSURE: 146 MMHG | WEIGHT: 211 LBS | HEIGHT: 72 IN | HEART RATE: 105 BPM

## 2022-01-05 DIAGNOSIS — M17.11 PRIMARY OSTEOARTHRITIS OF RIGHT KNEE: Primary | ICD-10-CM

## 2022-01-05 PROCEDURE — 20610 DRAIN/INJ JOINT/BURSA W/O US: CPT | Performed by: ORTHOPAEDIC SURGERY

## 2022-01-05 RX ORDER — TRIAMCINOLONE ACETONIDE 40 MG/ML
80 INJECTION, SUSPENSION INTRA-ARTICULAR; INTRAMUSCULAR
Status: COMPLETED | OUTPATIENT
Start: 2022-01-05 | End: 2022-01-05

## 2022-01-05 RX ORDER — LIDOCAINE HYDROCHLORIDE 10 MG/ML
2 INJECTION, SOLUTION EPIDURAL; INFILTRATION; INTRACAUDAL; PERINEURAL
Status: COMPLETED | OUTPATIENT
Start: 2022-01-05 | End: 2022-01-05

## 2022-01-05 RX ADMIN — TRIAMCINOLONE ACETONIDE 80 MG: 40 INJECTION, SUSPENSION INTRA-ARTICULAR; INTRAMUSCULAR at 13:45

## 2022-01-05 RX ADMIN — LIDOCAINE HYDROCHLORIDE 2 ML: 10 INJECTION, SOLUTION EPIDURAL; INFILTRATION; INTRACAUDAL; PERINEURAL at 13:45

## 2022-02-07 NOTE — PROGRESS NOTES
INJECTION    Patient: Nahum Restrepo    YOB: 1955    MRN: [id number]    CC: Right knee pain and discomfort.    History of Present Illness: Patient returns today for right knee pain.  His pain is located over the medial and lateral aspect of the joint.  The pain has been progressive in nature and remains intermittent .  His pain is worsened by kneeling, squatting. There has been improvement in the past with heat, NSAIDS and injections.     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     Allergies: No Known Allergies    Medications:   Home Medications:  Current Outpatient Medications on File Prior to Visit   Medication Sig   • alfuzosin (UROXATRAL) 10 MG 24 hr tablet Take  by mouth Daily.   • amLODIPine (NORVASC) 5 MG tablet Take 1 tablet by mouth Daily.   • diclofenac (VOLTAREN) 75 MG EC tablet TAKE 1 TABLET BY MOUTH TWICE DAILY AS NEEDED FOR JOINT PAIN   • gabapentin (NEURONTIN) 100 MG capsule Take 300 mg by mouth Every Night.   • metoprolol succinate XL (TOPROL-XL) 50 MG 24 hr tablet Take 1 tablet by mouth Daily.   • valsartan-hydrochlorothiazide (DIOVAN-HCT) 320-25 MG per tablet Take 1 tablet by mouth Daily for 90 days.     No current facility-administered medications on file prior to visit.     Current Medications:  Scheduled Meds:  PRN Meds:.    I have reviewed the patient's medical history in detail and updated the computerized patient record.  Review and summarization of old records include:    Past Medical History:   Diagnosis Date   • Hypertension    • Primary osteoarthritis of right knee 5/18/2021   • S/P right knee arthroscopy 7/7/2021     Past Surgical History:   Procedure Laterality Date   • KNEE ARTHROSCOPY Right 6/16/2021    Procedure: KNEE ARTHROSCOPY with chondroplasty with medial meniscectomy;  Surgeon: Km Felix MD;  Location: [facility] MAIN OR;  Service: Orthopedics;  Laterality: Right;   • SHOULDER SURGERY Left      Social History     Occupational History   • Not on file  "  Tobacco Use   • Smoking status: Never Smoker   • Smokeless tobacco: Never Used   Vaping Use   • Vaping Use: Never used   Substance and Sexual Activity   • Alcohol use: Yes     Comment: soc    • Drug use: Not Currently     Frequency: 4.0 times per week     Types: Marijuana   • Sexual activity: Defer      Social History     Social History Narrative   • Not on file     Family History   Problem Relation Age of Onset   • Arthritis Mother    • Osteoarthritis Mother    • Other Mother         Immobilized   • Diabetes Sister        Review of Systems        Wt Readings from Last 3 Encounters:   02/09/22 95.7 kg (211 lb)   01/05/22 95.7 kg (211 lb)   12/22/21 95.7 kg (211 lb)     Ht Readings from Last 3 Encounters:   02/09/22 182.9 cm (72\")   01/05/22 182.9 cm (72\")   12/22/21 182.9 cm (72\")     Body mass index is 28.62 kg/m².  Facility age limit for growth percentiles is 20 years.  Vitals:    02/09/22 1533   BP: 106/69       Physical Exam    Musculoskeletal:    Right knee (varus). Patient has crepitus throughout range of motion. Positive patellar grind test. Mild effusion. Lachman is negative. Pivot shift is negative. Anterior and posterior drawer signs are negative. Significant joint line tenderness is noted on the medial aspect of the knee. Patient has a varus orientation of the knee. There is fullness and tenderness in the Popliteal fossa. Mild distention of a Popliteal cyst is noted in this location. Range of motion in flexion is from 0-110 degrees. Neurovascular status is intact.  Dorsalis pedis and posterior tibial artery pulses are palpable. Common peroneal nerve function is well preserved. Patient's gait is cautious and antalgic. Skin and soft tissues are mildly swollen, consistent with synovitis and effusion. The patient has a significant limp with the first few steps after starting the gait cycle. Getting out of a chair takes a lot of effort due to pain on knee flexion. "       Diagnostics:      Procedure:  Procedures      Assessment:   Diagnoses and all orders for this visit:    1. Primary osteoarthritis of right knee (Primary)  -     Large Joint Arthrocentesis: R knee          Plan:   · Injected patient's right knee joint(s)with Depo-Medrol from an anteromedial approach   · Compression/brace   · Rest, ice, compression, and elevation (RICE) therapy  · OTC Tylenol 500-1000mg by mouth every 6 hours as needed for pain   · Follow up in 3 month(s)    Date of encounter: 02/09/2022   Km Felix MD

## 2022-02-09 ENCOUNTER — OFFICE VISIT (OUTPATIENT)
Dept: ORTHOPEDIC SURGERY | Facility: CLINIC | Age: 67
End: 2022-02-09

## 2022-02-09 VITALS
SYSTOLIC BLOOD PRESSURE: 106 MMHG | BODY MASS INDEX: 28.58 KG/M2 | WEIGHT: 211 LBS | HEIGHT: 72 IN | DIASTOLIC BLOOD PRESSURE: 69 MMHG

## 2022-02-09 DIAGNOSIS — M17.11 PRIMARY OSTEOARTHRITIS OF RIGHT KNEE: Primary | ICD-10-CM

## 2022-02-09 PROCEDURE — 20610 DRAIN/INJ JOINT/BURSA W/O US: CPT | Performed by: ORTHOPAEDIC SURGERY

## 2022-02-09 RX ORDER — LIDOCAINE HYDROCHLORIDE 10 MG/ML
2 INJECTION, SOLUTION EPIDURAL; INFILTRATION; INTRACAUDAL; PERINEURAL
Status: COMPLETED | OUTPATIENT
Start: 2022-02-09 | End: 2022-02-09

## 2022-02-09 RX ADMIN — LIDOCAINE HYDROCHLORIDE 2 ML: 10 INJECTION, SOLUTION EPIDURAL; INFILTRATION; INTRACAUDAL; PERINEURAL at 15:51

## 2022-02-09 NOTE — PROGRESS NOTES
..Large Joint Arthrocentesis: R knee  Date/Time: 2/9/2022 3:51 PM  Consent given by: patient  Site marked: site marked  Timeout: Immediately prior to procedure a time out was called to verify the correct patient, procedure, equipment, support staff and site/side marked as required   Supporting Documentation  Indications: pain   Procedure Details  Location: knee - R knee  Needle size: 18 G  Medications administered: 60 mg Sodium Hyaluronate 60 MG/3ML; 2 mL lidocaine PF 1% 1 %  Patient tolerance: patient tolerated the procedure well with no immediate complications

## 2022-03-29 PROCEDURE — 87086 URINE CULTURE/COLONY COUNT: CPT | Performed by: NURSE PRACTITIONER

## 2022-05-06 ENCOUNTER — OFFICE VISIT (OUTPATIENT)
Dept: ORTHOPEDIC SURGERY | Facility: CLINIC | Age: 67
End: 2022-05-06

## 2022-05-06 VITALS
SYSTOLIC BLOOD PRESSURE: 123 MMHG | HEART RATE: 125 BPM | OXYGEN SATURATION: 96 % | BODY MASS INDEX: 27.63 KG/M2 | WEIGHT: 204 LBS | DIASTOLIC BLOOD PRESSURE: 79 MMHG | HEIGHT: 72 IN

## 2022-05-06 DIAGNOSIS — M25.561 CHRONIC PAIN OF RIGHT KNEE: Primary | ICD-10-CM

## 2022-05-06 DIAGNOSIS — G89.29 CHRONIC PAIN OF RIGHT KNEE: Primary | ICD-10-CM

## 2022-05-06 DIAGNOSIS — M17.11 PRIMARY OSTEOARTHRITIS OF RIGHT KNEE: ICD-10-CM

## 2022-05-06 DIAGNOSIS — Z98.890 S/P RIGHT KNEE ARTHROSCOPY: ICD-10-CM

## 2022-05-06 PROCEDURE — 99215 OFFICE O/P EST HI 40 MIN: CPT | Performed by: ORTHOPAEDIC SURGERY

## 2022-05-06 RX ORDER — ALPRAZOLAM 0.5 MG/1
0.5 TABLET ORAL 2 TIMES DAILY PRN
COMMUNITY
Start: 2022-03-04 | End: 2022-06-16

## 2022-05-06 NOTE — PROGRESS NOTES
"Chief Complaint  Right knee pain and discomfort.    Subjective    History of Present Illness      Nahum Restrepo is a 66 y.o. male who presents to [facility] for persistent right knee pain and discomfort.  He has had symptoms for several years.  Over the past few months his symptoms have progressively gotten worse.  He finds it difficult to go up and down the steps and difficult to squat on the ground.  He is a retired  and states that he has been on concrete floors for a large part of his life.  The patient states that he had knee arthroscopy several years ago and that did seem to help his symptoms to improve to some degree.  He still has a lot of pain and discomfort and would like to walk for exercise.  He is looking for a better quality of life and states that he now wants to proceed with knee replacement surgery.  History of Present Illness   Pain Location:  RIGHT knee  Radiation: none  Quality: dull, aching  Intensity/Severity: moderate  Duration: 1.5 years  Progression of symptoms: yes, progressive worsening  Onset quality: gradual   Timing: intermittent  Aggravating Factors: kneeling, rising after sitting, squatting  Alleviating Factors: NSAIDs  Previous Episodes: yes  Associated Symptoms: pain, swelling  ADLs Affected: ambulating  Previous Treatment: NSAIDs       Objective   Vital Signs:   /79   Pulse (!) 125   Ht 182.9 cm (72\")   Wt 92.5 kg (204 lb)   SpO2 96%   BMI 27.67 kg/m²     Physical Exam  Physical Exam  Vitals signs and nursing note reviewed.   Constitutional:       Appearance: Normal appearance.   Pulmonary:      Effort: Pulmonary effort is normal.   Skin:     General: Skin is warm and dry.      Capillary Refill: Capillary refill takes less than 2 seconds.   Neurological:      General: No focal deficit present.      Mental Status: He is alert and oriented to person, place, and time. Mental status is at baseline.   Psychiatric:         " Mood and Affect: Mood normal.         Behavior: Behavior normal.         Thought Content: Thought content normal.         Judgment: Judgment normal.     Ortho Exam   Right knee (varus). Patient has crepitus throughout range of motion. Positive patellar grind test. Mild effusion. Lachman is negative. Pivot shift is negative. Anterior and posterior drawer signs are negative. Significant joint line tenderness is noted on the medial aspect of the knee. Patient has a varus orientation of the knee. There is fullness and tenderness in the Popliteal fossa. Mild distention of a Popliteal cyst is noted in this location. Range of motion in flexion is from 0-110 degrees. Neurovascular status is intact.  Dorsalis pedis and posterior tibial artery pulses are palpable. Common peroneal nerve function is well preserved. Patient's gait is cautious and antalgic. Skin and soft tissues are mildly swollen, consistent with synovitis and effusion. The patient has a significant limp with the first few steps after starting the gait cycle. Getting out of a chair takes a lot of effort due to pain on knee flexion.         Result Review :   The following data was reviewed by: Km Felix MD on 05/06/2022:  Radiologic studies - see below for interpretation  xrays obtained today  right Knee X-Ray  Indication: Evaluation of pain and discomfort on the medial aspect of the knee.  AP, Lateral views  Findings: Advanced degenerative osteoarthritis of the knee with significant narrowing of the medial joint space.  no bony lesion  Soft tissues within normal limits  decreased joint spaces  Hardware appropriately positioned not applicable      no prior studies available for comparison.    This patient's x-ray report was graded according to the Kellgren and Momo classification.  This took into account the joint space narrowing, osteophyte formation, sclerosis of the distal femur/proximal tibia along with deformity of those bones.  The findings were  indicative of K L grade 3.    X-RAY was ordered and reviewed by Km Felix MD          Procedures           Assessment   Assessment and Plan    Diagnoses and all orders for this visit:    1. Chronic pain of right knee (Primary)  -     XR Knee 3 View Right    2. Primary osteoarthritis of right knee    3. S/P right knee arthroscopy          Follow Up   · Compression/brace to the knee to prevent it from buckling and giving out.  · Intra-articular steroid injection and viscosupplementation injections discussed with the patient.  · Calcium and vitamin D for bone health.  · Glucosamine, chondroitin and turmeric for cartilage health.  · Time spent in the office today with the patient is 45 minutes going over treatment options and wished to go ahead and schedule the replacement surgery for the right side in the near future.  · Indications potential complications and time off work and rehabilitation discussed with patient at length.  · Rest, ice, compression, and elevation (RICE) therapy  · Stretching and strengthening exercises  · OTC Tylenol 500-1000mg by mouth every 6 hours as needed for pain   · Follow up in 6 week(s).  Dr. Felix  The patient was seen today for preoperative discussion.  The patient has been tried on over-the-counter and prescription NSAID's despite the risks of anti-inflammatory bleeding, peptic ulcers and erosive gastritis with short term benefit only.  Braces have been prescribed for mechanical support.  Patient has been participating in an exercise program specifically targeting joint pain relief with limited benefit. Intraarticular injections have been used periodically with some but not complete relief of pain.  Ambulation aids have also been utilized.    · The details of the surgical procedure were explained including the location of probable incisions and a description of the likely hardware/grafts to be used. The patient understands the likely convalescence after surgery as well as the  rehabilitation required.  Also, we have thoroughly discussed with the patient the risks, benefits and alternatives to surgery.  Risks include but are not limited to the risk of infection, joint stiffness, limited range of motion, wound healing problems, scar tissue build up, myocardial infarction, stroke, blood clots (including DVT and/or pulmonary embolus along with the risk of death) neurologic and/or vascular injury, limb length discrepancy, fracture, dislocation, nonunion, malunion, continued pain and need for further surgery including hardware failure requiring revision.   • Patient was given instructions and counseling regarding his condition or for health maintenance advice. Please see specific information pulled into the AVS if appropriate.     Km Felix MD   Date of Encounter: 5/6/2022   Dilation    EMR Dragon/Transcription disclaimer:  Much of this encounter note is an electronic transcription/translation of spoken language to printed text. The electronic translation of spoken language may permit erroneous, or at times, nonsensical words or phrases to be inadvertently transcribed; Although I have reviewed the note for such errors, some may still exist.

## 2022-06-08 ENCOUNTER — TELEPHONE (OUTPATIENT)
Dept: ORTHOPEDIC SURGERY | Facility: CLINIC | Age: 67
End: 2022-06-08

## 2022-06-08 NOTE — TELEPHONE ENCOUNTER
UNABLE TO WT    Caller: TIFFANI PUCKETT    Relationship to patient: PATIENT     Best call back number: [phone]    Patient is needing: SCHEDULED FOR R KNEE SX 07/12/22.  WOULD LIKE TO MOVE SX UP SOONER     IF NOT ABLE TO MOVE SX UP, PLEASE SCHEDULE INJECTION FOR RIGHT KNEE

## 2022-06-14 ENCOUNTER — LAB (OUTPATIENT)
Dept: LAB | Facility: HOSPITAL | Age: 67
End: 2022-06-14

## 2022-06-14 ENCOUNTER — HOSPITAL ENCOUNTER (OUTPATIENT)
Dept: CARDIOLOGY | Facility: HOSPITAL | Age: 67
Discharge: HOME | End: 2022-06-14

## 2022-06-14 ENCOUNTER — HOSPITAL ENCOUNTER (OUTPATIENT)
Dept: GENERAL RADIOLOGY | Facility: HOSPITAL | Age: 67
Discharge: HOME | End: 2022-06-14

## 2022-06-14 ENCOUNTER — PRE-ADMISSION TESTING (OUTPATIENT)
Dept: PREADMISSION TESTING | Facility: HOSPITAL | Age: 67
End: 2022-06-14

## 2022-06-14 VITALS
OXYGEN SATURATION: 97 % | RESPIRATION RATE: 20 BRPM | SYSTOLIC BLOOD PRESSURE: 153 MMHG | BODY MASS INDEX: 29.4 KG/M2 | HEIGHT: 71 IN | DIASTOLIC BLOOD PRESSURE: 86 MMHG | WEIGHT: 210 LBS | TEMPERATURE: 98.8 F | HEART RATE: 76 BPM

## 2022-06-14 DIAGNOSIS — M17.11 PRIMARY OSTEOARTHRITIS OF RIGHT KNEE: ICD-10-CM

## 2022-06-14 LAB
ABO GROUP BLD: NORMAL
ANION GAP SERPL CALCULATED.3IONS-SCNC: 12.4 MMOL/L (ref 5–15)
BLD GP AB SCN SERPL QL: NEGATIVE
BUN SERPL-MCNC: 25 MG/DL (ref 8–23)
BUN/CREAT SERPL: 27.8 (ref 7–25)
CALCIUM SPEC-SCNC: 9.2 MG/DL (ref 8.6–10.5)
CHLORIDE SERPL-SCNC: 102 MMOL/L (ref 98–107)
CO2 SERPL-SCNC: 23.6 MMOL/L (ref 22–29)
CREAT SERPL-MCNC: 0.9 MG/DL (ref 0.76–1.27)
DEPRECATED RDW RBC AUTO: 38.9 FL (ref 37–54)
EGFRCR SERPLBLD CKD-EPI 2021: 93.6 ML/MIN/1.73
ERYTHROCYTE [DISTWIDTH] IN BLOOD BY AUTOMATED COUNT: 12.2 % (ref 12.3–15.4)
GLUCOSE SERPL-MCNC: 92 MG/DL (ref 65–99)
HCT VFR BLD AUTO: 41.1 % (ref 37.5–51)
HGB BLD-MCNC: 13.8 G/DL (ref 13–17.7)
MCH RBC QN AUTO: 29.4 PG (ref 26.6–33)
MCHC RBC AUTO-ENTMCNC: 33.6 G/DL (ref 31.5–35.7)
MCV RBC AUTO: 87.4 FL (ref 79–97)
MRSA DNA SPEC QL NAA+PROBE: NORMAL
PLATELET # BLD AUTO: 216 10*3/MM3 (ref 140–450)
PMV BLD AUTO: 10.1 FL (ref 6–12)
POTASSIUM SERPL-SCNC: 3.8 MMOL/L (ref 3.5–5.2)
RBC # BLD AUTO: 4.7 10*6/MM3 (ref 4.14–5.8)
RH BLD: POSITIVE
SODIUM SERPL-SCNC: 138 MMOL/L (ref 136–145)
T&S EXPIRATION DATE: NORMAL
WBC NRBC COR # BLD: 7.11 10*3/MM3 (ref 3.4–10.8)

## 2022-06-14 PROCEDURE — 86900 BLOOD TYPING SEROLOGIC ABO: CPT

## 2022-06-14 PROCEDURE — 36415 COLL VENOUS BLD VENIPUNCTURE: CPT

## 2022-06-14 PROCEDURE — 85027 COMPLETE CBC AUTOMATED: CPT

## 2022-06-14 PROCEDURE — 86901 BLOOD TYPING SEROLOGIC RH(D): CPT

## 2022-06-14 PROCEDURE — 80048 BASIC METABOLIC PNL TOTAL CA: CPT

## 2022-06-14 PROCEDURE — 87641 MR-STAPH DNA AMP PROBE: CPT

## 2022-06-14 PROCEDURE — 97162 PT EVAL MOD COMPLEX 30 MIN: CPT

## 2022-06-14 PROCEDURE — 93005 ELECTROCARDIOGRAM TRACING: CPT | Performed by: ORTHOPAEDIC SURGERY

## 2022-06-14 PROCEDURE — 93010 ELECTROCARDIOGRAM REPORT: CPT | Performed by: INTERNAL MEDICINE

## 2022-06-14 PROCEDURE — 86850 RBC ANTIBODY SCREEN: CPT

## 2022-06-14 PROCEDURE — 71046 X-RAY EXAM CHEST 2 VIEWS: CPT

## 2022-06-14 RX ORDER — MULTIPLE VITAMINS W/ MINERALS TAB 9MG-400MCG
1 TAB ORAL DAILY
COMMUNITY

## 2022-06-14 RX ORDER — IBUPROFEN 200 MG
200 TABLET ORAL EVERY 6 HOURS PRN
COMMUNITY
End: 2022-06-22 | Stop reason: HOSPADM

## 2022-06-15 DIAGNOSIS — F41.9 ANXIETY: Primary | ICD-10-CM

## 2022-06-16 RX ORDER — ALPRAZOLAM 0.5 MG/1
TABLET ORAL
Qty: 60 TABLET | Refills: 3 | Status: SHIPPED | OUTPATIENT
Start: 2022-06-16 | End: 2023-03-09

## 2022-06-17 LAB — QT INTERVAL: 393 MS

## 2022-06-18 ENCOUNTER — LAB (OUTPATIENT)
Dept: LAB | Facility: HOSPITAL | Age: 67
End: 2022-06-18

## 2022-06-18 DIAGNOSIS — M17.11 PRIMARY OSTEOARTHRITIS OF RIGHT KNEE: ICD-10-CM

## 2022-06-18 LAB — SARS-COV-2 ORF1AB RESP QL NAA+PROBE: NOT DETECTED

## 2022-06-18 PROCEDURE — U0004 COV-19 TEST NON-CDC HGH THRU: HCPCS

## 2022-06-18 PROCEDURE — C9803 HOPD COVID-19 SPEC COLLECT: HCPCS

## 2022-06-20 ENCOUNTER — ANESTHESIA EVENT (OUTPATIENT)
Dept: PERIOP | Facility: HOSPITAL | Age: 67
End: 2022-06-20

## 2022-06-21 ENCOUNTER — ANESTHESIA (OUTPATIENT)
Dept: PERIOP | Facility: HOSPITAL | Age: 67
End: 2022-06-21

## 2022-06-21 ENCOUNTER — APPOINTMENT (OUTPATIENT)
Dept: GENERAL RADIOLOGY | Facility: HOSPITAL | Age: 67
End: 2022-06-21

## 2022-06-21 ENCOUNTER — HOSPITAL ENCOUNTER (OUTPATIENT)
Facility: HOSPITAL | Age: 67
Discharge: HOME | End: 2022-06-22
Attending: ORTHOPAEDIC SURGERY | Admitting: ORTHOPAEDIC SURGERY

## 2022-06-21 DIAGNOSIS — M25.561 CHRONIC PAIN OF RIGHT KNEE: Primary | ICD-10-CM

## 2022-06-21 DIAGNOSIS — G89.29 CHRONIC PAIN OF RIGHT KNEE: Primary | ICD-10-CM

## 2022-06-21 DIAGNOSIS — Z96.651 S/P TKR (TOTAL KNEE REPLACEMENT), RIGHT: Primary | ICD-10-CM

## 2022-06-21 DIAGNOSIS — M17.11 PRIMARY OSTEOARTHRITIS OF RIGHT KNEE: ICD-10-CM

## 2022-06-21 PROCEDURE — C1713 ANCHOR/SCREW BN/BN,TIS/BN: HCPCS | Performed by: ORTHOPAEDIC SURGERY

## 2022-06-21 PROCEDURE — 25010000002 CEFAZOLIN PER 500 MG: Performed by: ORTHOPAEDIC SURGERY

## 2022-06-21 PROCEDURE — 63710000001 TAMSULOSIN 0.4 MG CAPSULE: Performed by: ORTHOPAEDIC SURGERY

## 2022-06-21 PROCEDURE — 76942 ECHO GUIDE FOR BIOPSY: CPT | Performed by: ORTHOPAEDIC SURGERY

## 2022-06-21 PROCEDURE — 25010000002 PROPOFOL 10 MG/ML EMULSION

## 2022-06-21 PROCEDURE — 63710000001 MELOXICAM 15 MG TABLET: Performed by: PHYSICIAN ASSISTANT

## 2022-06-21 PROCEDURE — A9270 NON-COVERED ITEM OR SERVICE: HCPCS | Performed by: PHYSICIAN ASSISTANT

## 2022-06-21 PROCEDURE — 25010000002 FENTANYL CITRATE (PF) 100 MCG/2ML SOLUTION

## 2022-06-21 PROCEDURE — 27447 TOTAL KNEE ARTHROPLASTY: CPT | Performed by: ORTHOPAEDIC SURGERY

## 2022-06-21 PROCEDURE — 25010000002 MORPHINE PER 10 MG: Performed by: ORTHOPAEDIC SURGERY

## 2022-06-21 PROCEDURE — C9290 INJ, BUPIVACAINE LIPOSOME: HCPCS | Performed by: ORTHOPAEDIC SURGERY

## 2022-06-21 PROCEDURE — C1776 JOINT DEVICE (IMPLANTABLE): HCPCS | Performed by: ORTHOPAEDIC SURGERY

## 2022-06-21 PROCEDURE — 25010000002 HYDROMORPHONE 1 MG/ML SOLUTION

## 2022-06-21 PROCEDURE — 0 BUPIVACAINE LIPOSOME 1.3 % SUSPENSION 10 ML VIAL: Performed by: ORTHOPAEDIC SURGERY

## 2022-06-21 PROCEDURE — 25010000002 ROPIVACAINE PER 1 MG: Performed by: ORTHOPAEDIC SURGERY

## 2022-06-21 PROCEDURE — 63710000001 GABAPENTIN 300 MG CAPSULE: Performed by: PHYSICIAN ASSISTANT

## 2022-06-21 PROCEDURE — 25010000002 DEXAMETHASONE PER 1 MG: Performed by: ANESTHESIOLOGY

## 2022-06-21 PROCEDURE — G0378 HOSPITAL OBSERVATION PER HR: HCPCS

## 2022-06-21 PROCEDURE — 73560 X-RAY EXAM OF KNEE 1 OR 2: CPT

## 2022-06-21 PROCEDURE — 25010000002 ONDANSETRON PER 1 MG

## 2022-06-21 PROCEDURE — 20985 CPTR-ASST DIR MS PX: CPT | Performed by: ORTHOPAEDIC SURGERY

## 2022-06-21 PROCEDURE — 63710000001 ACETAMINOPHEN 500 MG TABLET: Performed by: PHYSICIAN ASSISTANT

## 2022-06-21 PROCEDURE — S0260 H&P FOR SURGERY: HCPCS | Performed by: ORTHOPAEDIC SURGERY

## 2022-06-21 PROCEDURE — A9270 NON-COVERED ITEM OR SERVICE: HCPCS | Performed by: ORTHOPAEDIC SURGERY

## 2022-06-21 PROCEDURE — 63710000001 OXYCODONE 10 MG TABLET EXTENDED-RELEASE 12 HOUR: Performed by: PHYSICIAN ASSISTANT

## 2022-06-21 PROCEDURE — 25010000002 FENTANYL CITRATE (PF) 50 MCG/ML SOLUTION: Performed by: ANESTHESIOLOGY

## 2022-06-21 DEVICE — IMPLANTABLE DEVICE: Type: IMPLANTABLE DEVICE | Site: KNEE | Status: FUNCTIONAL

## 2022-06-21 DEVICE — ART/SRF KN PERSONA/VE MC EF 8TO11 10MM RT: Type: IMPLANTABLE DEVICE | Site: KNEE | Status: FUNCTIONAL

## 2022-06-21 DEVICE — DEV CONTRL TISS STRATAFIX SPIRAL MNCRYL UD 3/0 PLS 30CM: Type: IMPLANTABLE DEVICE | Site: KNEE | Status: FUNCTIONAL

## 2022-06-21 DEVICE — CAP EXT STEM KN UPCHRG: Type: IMPLANTABLE DEVICE | Site: KNEE | Status: FUNCTIONAL

## 2022-06-21 DEVICE — WASHR CANN SCRW 3.5/4.0MM: Type: IMPLANTABLE DEVICE | Site: KNEE | Status: FUNCTIONAL

## 2022-06-21 DEVICE — STEM TIB/KN PERSONA CMT 5D SZF RT: Type: IMPLANTABLE DEVICE | Site: KNEE | Status: FUNCTIONAL

## 2022-06-21 DEVICE — EXT STEM FEM/KN PERSONA TPR 14XPLS30MM: Type: IMPLANTABLE DEVICE | Site: KNEE | Status: FUNCTIONAL

## 2022-06-21 DEVICE — COMP FEM/KN PERSONA CR CMT COCR STD SZ11 RT: Type: IMPLANTABLE DEVICE | Site: KNEE | Status: FUNCTIONAL

## 2022-06-21 DEVICE — CMT BONE REFOBACIN R W/GENT 1X40: Type: IMPLANTABLE DEVICE | Site: KNEE | Status: FUNCTIONAL

## 2022-06-21 DEVICE — CAP TOTL KN CMT PRIMARY: Type: IMPLANTABLE DEVICE | Site: KNEE | Status: FUNCTIONAL

## 2022-06-21 DEVICE — PAT KN PERSONA VE CRS/LNK CMT 9.5X38MM: Type: IMPLANTABLE DEVICE | Site: KNEE | Status: FUNCTIONAL

## 2022-06-21 DEVICE — DEV WND/CLS CONTRL TISS STRATAFIX SYMM PDS PLS CTX 60CM VIL: Type: IMPLANTABLE DEVICE | Site: KNEE | Status: FUNCTIONAL

## 2022-06-21 RX ORDER — ACETAMINOPHEN 500 MG
1000 TABLET ORAL ONCE
Status: COMPLETED | OUTPATIENT
Start: 2022-06-21 | End: 2022-06-21

## 2022-06-21 RX ORDER — SODIUM CHLORIDE, SODIUM LACTATE, POTASSIUM CHLORIDE, CALCIUM CHLORIDE 600; 310; 30; 20 MG/100ML; MG/100ML; MG/100ML; MG/100ML
75 INJECTION, SOLUTION INTRAVENOUS CONTINUOUS
Status: DISCONTINUED | OUTPATIENT
Start: 2022-06-21 | End: 2022-06-22 | Stop reason: HOSPADM

## 2022-06-21 RX ORDER — TAMSULOSIN HYDROCHLORIDE 0.4 MG/1
0.4 CAPSULE ORAL NIGHTLY
Status: DISCONTINUED | OUTPATIENT
Start: 2022-06-21 | End: 2022-06-22 | Stop reason: HOSPADM

## 2022-06-21 RX ORDER — FENTANYL CITRATE 50 UG/ML
INJECTION, SOLUTION INTRAMUSCULAR; INTRAVENOUS AS NEEDED
Status: DISCONTINUED | OUTPATIENT
Start: 2022-06-21 | End: 2022-06-21 | Stop reason: SURG

## 2022-06-21 RX ORDER — MIDAZOLAM HYDROCHLORIDE 1 MG/ML
1 INJECTION INTRAMUSCULAR; INTRAVENOUS
Status: DISCONTINUED | OUTPATIENT
Start: 2022-06-21 | End: 2022-06-21 | Stop reason: HOSPADM

## 2022-06-21 RX ORDER — GABAPENTIN 300 MG/1
600 CAPSULE ORAL ONCE
Status: COMPLETED | OUTPATIENT
Start: 2022-06-21 | End: 2022-06-21

## 2022-06-21 RX ORDER — SODIUM CHLORIDE, SODIUM LACTATE, POTASSIUM CHLORIDE, CALCIUM CHLORIDE 600; 310; 30; 20 MG/100ML; MG/100ML; MG/100ML; MG/100ML
100 INJECTION, SOLUTION INTRAVENOUS CONTINUOUS
Status: DISCONTINUED | OUTPATIENT
Start: 2022-06-21 | End: 2022-06-22

## 2022-06-21 RX ORDER — OXYCODONE HCL 10 MG/1
10 TABLET, FILM COATED, EXTENDED RELEASE ORAL ONCE
Status: COMPLETED | OUTPATIENT
Start: 2022-06-21 | End: 2022-06-21

## 2022-06-21 RX ORDER — OXYCODONE HYDROCHLORIDE 5 MG/1
5 TABLET ORAL ONCE AS NEEDED
Status: DISCONTINUED | OUTPATIENT
Start: 2022-06-21 | End: 2022-06-21 | Stop reason: HOSPADM

## 2022-06-21 RX ORDER — ONDANSETRON 4 MG/1
4 TABLET, FILM COATED ORAL EVERY 6 HOURS PRN
Status: DISCONTINUED | OUTPATIENT
Start: 2022-06-21 | End: 2022-06-22 | Stop reason: HOSPADM

## 2022-06-21 RX ORDER — ONDANSETRON 2 MG/ML
INJECTION INTRAMUSCULAR; INTRAVENOUS AS NEEDED
Status: DISCONTINUED | OUTPATIENT
Start: 2022-06-21 | End: 2022-06-21 | Stop reason: SURG

## 2022-06-21 RX ORDER — MORPHINE SULFATE 4 MG/ML
4 INJECTION, SOLUTION INTRAMUSCULAR; INTRAVENOUS
Status: DISCONTINUED | OUTPATIENT
Start: 2022-06-21 | End: 2022-06-22 | Stop reason: HOSPADM

## 2022-06-21 RX ORDER — TRANEXAMIC ACID 10 MG/ML
1000 INJECTION, SOLUTION INTRAVENOUS ONCE
Status: COMPLETED | OUTPATIENT
Start: 2022-06-21 | End: 2022-06-21

## 2022-06-21 RX ORDER — MELOXICAM 15 MG/1
15 TABLET ORAL DAILY
Status: DISCONTINUED | OUTPATIENT
Start: 2022-06-22 | End: 2022-06-22 | Stop reason: HOSPADM

## 2022-06-21 RX ORDER — MIDAZOLAM HYDROCHLORIDE 1 MG/ML
0.5 INJECTION INTRAMUSCULAR; INTRAVENOUS
Status: DISCONTINUED | OUTPATIENT
Start: 2022-06-21 | End: 2022-06-21 | Stop reason: HOSPADM

## 2022-06-21 RX ORDER — CELECOXIB 200 MG/1
200 CAPSULE ORAL ONCE
Status: DISCONTINUED | OUTPATIENT
Start: 2022-06-21 | End: 2022-06-21 | Stop reason: HOSPADM

## 2022-06-21 RX ORDER — MELOXICAM 15 MG/1
15 TABLET ORAL ONCE
Status: COMPLETED | OUTPATIENT
Start: 2022-06-21 | End: 2022-06-21

## 2022-06-21 RX ORDER — ACETAMINOPHEN 325 MG/1
650 TABLET ORAL EVERY 6 HOURS PRN
COMMUNITY
End: 2022-06-22 | Stop reason: HOSPADM

## 2022-06-21 RX ORDER — ONDANSETRON 4 MG/1
4 TABLET, FILM COATED ORAL EVERY 6 HOURS PRN
Qty: 20 TABLET | Refills: 0 | Status: SHIPPED | OUTPATIENT
Start: 2022-06-21 | End: 2022-06-29

## 2022-06-21 RX ORDER — DEXAMETHASONE SODIUM PHOSPHATE 4 MG/ML
INJECTION, SOLUTION INTRA-ARTICULAR; INTRALESIONAL; INTRAMUSCULAR; INTRAVENOUS; SOFT TISSUE
Status: COMPLETED | OUTPATIENT
Start: 2022-06-21 | End: 2022-06-21

## 2022-06-21 RX ORDER — METOPROLOL SUCCINATE 50 MG/1
50 TABLET, EXTENDED RELEASE ORAL DAILY
Status: DISCONTINUED | OUTPATIENT
Start: 2022-06-22 | End: 2022-06-22 | Stop reason: HOSPADM

## 2022-06-21 RX ORDER — SODIUM CHLORIDE, SODIUM LACTATE, POTASSIUM CHLORIDE, CALCIUM CHLORIDE 600; 310; 30; 20 MG/100ML; MG/100ML; MG/100ML; MG/100ML
9 INJECTION, SOLUTION INTRAVENOUS CONTINUOUS PRN
Status: DISCONTINUED | OUTPATIENT
Start: 2022-06-21 | End: 2022-06-21 | Stop reason: HOSPADM

## 2022-06-21 RX ORDER — ACETAMINOPHEN 500 MG
1000 TABLET ORAL ONCE
Status: DISCONTINUED | OUTPATIENT
Start: 2022-06-21 | End: 2022-06-21

## 2022-06-21 RX ORDER — FENTANYL CITRATE 50 UG/ML
INJECTION, SOLUTION INTRAMUSCULAR; INTRAVENOUS
Status: COMPLETED | OUTPATIENT
Start: 2022-06-21 | End: 2022-06-21

## 2022-06-21 RX ORDER — BUPIVACAINE HYDROCHLORIDE 5 MG/ML
INJECTION, SOLUTION EPIDURAL; INTRACAUDAL
Status: COMPLETED | OUTPATIENT
Start: 2022-06-21 | End: 2022-06-21

## 2022-06-21 RX ORDER — SODIUM CHLORIDE 0.9 % (FLUSH) 0.9 %
10 SYRINGE (ML) INJECTION AS NEEDED
Status: DISCONTINUED | OUTPATIENT
Start: 2022-06-21 | End: 2022-06-21 | Stop reason: HOSPADM

## 2022-06-21 RX ORDER — EPHEDRINE SULFATE 5 MG/ML
5 INJECTION INTRAVENOUS ONCE AS NEEDED
Status: DISCONTINUED | OUTPATIENT
Start: 2022-06-21 | End: 2022-06-21 | Stop reason: HOSPADM

## 2022-06-21 RX ORDER — SODIUM CHLORIDE 0.9 % (FLUSH) 0.9 %
10 SYRINGE (ML) INJECTION EVERY 12 HOURS SCHEDULED
Status: DISCONTINUED | OUTPATIENT
Start: 2022-06-21 | End: 2022-06-21 | Stop reason: HOSPADM

## 2022-06-21 RX ORDER — FLUMAZENIL 0.1 MG/ML
0.2 INJECTION INTRAVENOUS AS NEEDED
Status: DISCONTINUED | OUTPATIENT
Start: 2022-06-21 | End: 2022-06-21 | Stop reason: HOSPADM

## 2022-06-21 RX ORDER — SODIUM CHLORIDE, SODIUM LACTATE, POTASSIUM CHLORIDE, CALCIUM CHLORIDE 600; 310; 30; 20 MG/100ML; MG/100ML; MG/100ML; MG/100ML
INJECTION, SOLUTION INTRAVENOUS CONTINUOUS PRN
Status: DISCONTINUED | OUTPATIENT
Start: 2022-06-21 | End: 2022-06-21 | Stop reason: SURG

## 2022-06-21 RX ORDER — GABAPENTIN 300 MG/1
300 CAPSULE ORAL ONCE
Status: DISCONTINUED | OUTPATIENT
Start: 2022-06-21 | End: 2022-06-21

## 2022-06-21 RX ORDER — NALOXONE HCL 0.4 MG/ML
0.4 VIAL (ML) INJECTION AS NEEDED
Status: DISCONTINUED | OUTPATIENT
Start: 2022-06-21 | End: 2022-06-21 | Stop reason: HOSPADM

## 2022-06-21 RX ORDER — FENTANYL CITRATE 50 UG/ML
25 INJECTION, SOLUTION INTRAMUSCULAR; INTRAVENOUS
Status: DISCONTINUED | OUTPATIENT
Start: 2022-06-21 | End: 2022-06-21 | Stop reason: HOSPADM

## 2022-06-21 RX ORDER — LIDOCAINE HYDROCHLORIDE 10 MG/ML
INJECTION, SOLUTION EPIDURAL; INFILTRATION; INTRACAUDAL; PERINEURAL AS NEEDED
Status: DISCONTINUED | OUTPATIENT
Start: 2022-06-21 | End: 2022-06-21 | Stop reason: SURG

## 2022-06-21 RX ORDER — OXYCODONE AND ACETAMINOPHEN 7.5; 325 MG/1; MG/1
TABLET ORAL
Qty: 40 TABLET | Refills: 0 | Status: SHIPPED | OUTPATIENT
Start: 2022-06-21 | End: 2022-06-29 | Stop reason: SDUPTHER

## 2022-06-21 RX ORDER — MEPERIDINE HYDROCHLORIDE 25 MG/ML
12.5 INJECTION INTRAMUSCULAR; INTRAVENOUS; SUBCUTANEOUS
Status: DISCONTINUED | OUTPATIENT
Start: 2022-06-21 | End: 2022-06-21 | Stop reason: HOSPADM

## 2022-06-21 RX ORDER — OXYCODONE HYDROCHLORIDE 5 MG/1
10 TABLET ORAL EVERY 4 HOURS PRN
Status: DISCONTINUED | OUTPATIENT
Start: 2022-06-21 | End: 2022-06-21 | Stop reason: HOSPADM

## 2022-06-21 RX ORDER — PHENYLEPHRINE HCL IN 0.9% NACL 1 MG/10 ML
SYRINGE (ML) INTRAVENOUS AS NEEDED
Status: DISCONTINUED | OUTPATIENT
Start: 2022-06-21 | End: 2022-06-21 | Stop reason: SURG

## 2022-06-21 RX ORDER — LABETALOL HYDROCHLORIDE 5 MG/ML
5 INJECTION, SOLUTION INTRAVENOUS
Status: DISCONTINUED | OUTPATIENT
Start: 2022-06-21 | End: 2022-06-21 | Stop reason: HOSPADM

## 2022-06-21 RX ORDER — IPRATROPIUM BROMIDE AND ALBUTEROL SULFATE 2.5; .5 MG/3ML; MG/3ML
3 SOLUTION RESPIRATORY (INHALATION) ONCE AS NEEDED
Status: DISCONTINUED | OUTPATIENT
Start: 2022-06-21 | End: 2022-06-21 | Stop reason: HOSPADM

## 2022-06-21 RX ORDER — ONDANSETRON 2 MG/ML
4 INJECTION INTRAMUSCULAR; INTRAVENOUS ONCE AS NEEDED
Status: DISCONTINUED | OUTPATIENT
Start: 2022-06-21 | End: 2022-06-21 | Stop reason: HOSPADM

## 2022-06-21 RX ORDER — AMLODIPINE BESYLATE 5 MG/1
5 TABLET ORAL DAILY
Status: DISCONTINUED | OUTPATIENT
Start: 2022-06-22 | End: 2022-06-22 | Stop reason: HOSPADM

## 2022-06-21 RX ORDER — ONDANSETRON 2 MG/ML
4 INJECTION INTRAMUSCULAR; INTRAVENOUS EVERY 6 HOURS PRN
Status: DISCONTINUED | OUTPATIENT
Start: 2022-06-21 | End: 2022-06-22 | Stop reason: HOSPADM

## 2022-06-21 RX ORDER — PROPOFOL 10 MG/ML
VIAL (ML) INTRAVENOUS AS NEEDED
Status: DISCONTINUED | OUTPATIENT
Start: 2022-06-21 | End: 2022-06-21 | Stop reason: SURG

## 2022-06-21 RX ORDER — OXYCODONE HYDROCHLORIDE 5 MG/1
10 TABLET ORAL EVERY 4 HOURS PRN
Status: DISCONTINUED | OUTPATIENT
Start: 2022-06-21 | End: 2022-06-22 | Stop reason: HOSPADM

## 2022-06-21 RX ORDER — OXYCODONE HYDROCHLORIDE 5 MG/1
10 TABLET ORAL ONCE
Status: DISCONTINUED | OUTPATIENT
Start: 2022-06-21 | End: 2022-06-21

## 2022-06-21 RX ORDER — NALOXONE HCL 0.4 MG/ML
0.4 VIAL (ML) INJECTION
Status: DISCONTINUED | OUTPATIENT
Start: 2022-06-21 | End: 2022-06-22 | Stop reason: HOSPADM

## 2022-06-21 RX ORDER — ALPRAZOLAM 0.5 MG/1
0.5 TABLET ORAL 2 TIMES DAILY PRN
Status: DISCONTINUED | OUTPATIENT
Start: 2022-06-21 | End: 2022-06-22 | Stop reason: HOSPADM

## 2022-06-21 RX ORDER — DIPHENHYDRAMINE HYDROCHLORIDE 50 MG/ML
12.5 INJECTION INTRAMUSCULAR; INTRAVENOUS
Status: DISCONTINUED | OUTPATIENT
Start: 2022-06-21 | End: 2022-06-21 | Stop reason: HOSPADM

## 2022-06-21 RX ADMIN — HYDROMORPHONE HYDROCHLORIDE 0.5 MG: 1 INJECTION, SOLUTION INTRAMUSCULAR; INTRAVENOUS; SUBCUTANEOUS at 19:33

## 2022-06-21 RX ADMIN — FENTANYL CITRATE 100 MCG: 50 INJECTION, SOLUTION INTRAMUSCULAR; INTRAVENOUS at 11:49

## 2022-06-21 RX ADMIN — BUPIVACAINE HYDROCHLORIDE 20 ML: 5 INJECTION, SOLUTION EPIDURAL; INTRACAUDAL; PERINEURAL at 11:49

## 2022-06-21 RX ADMIN — ACETAMINOPHEN 1000 MG: 500 TABLET, FILM COATED ORAL at 10:44

## 2022-06-21 RX ADMIN — ONDANSETRON 4 MG: 2 INJECTION INTRAMUSCULAR; INTRAVENOUS at 14:20

## 2022-06-21 RX ADMIN — MORPHINE SULFATE 4 MG: 4 INJECTION, SOLUTION INTRAMUSCULAR; INTRAVENOUS at 22:11

## 2022-06-21 RX ADMIN — PROPOFOL 150 MCG/KG/MIN: 10 INJECTION, EMULSION INTRAVENOUS at 12:39

## 2022-06-21 RX ADMIN — FENTANYL CITRATE 50 MCG: 50 INJECTION, SOLUTION INTRAMUSCULAR; INTRAVENOUS at 13:02

## 2022-06-21 RX ADMIN — HYDROMORPHONE HYDROCHLORIDE 0.5 MG: 1 INJECTION, SOLUTION INTRAMUSCULAR; INTRAVENOUS; SUBCUTANEOUS at 15:17

## 2022-06-21 RX ADMIN — CEFAZOLIN 2 G: 2 INJECTION, POWDER, FOR SOLUTION INTRAMUSCULAR; INTRAVENOUS at 22:11

## 2022-06-21 RX ADMIN — SODIUM CHLORIDE, SODIUM LACTATE, POTASSIUM CHLORIDE, AND CALCIUM CHLORIDE 75 ML/HR: .6; .31; .03; .02 INJECTION, SOLUTION INTRAVENOUS at 22:54

## 2022-06-21 RX ADMIN — HYDROMORPHONE HYDROCHLORIDE 0.5 MG: 1 INJECTION, SOLUTION INTRAMUSCULAR; INTRAVENOUS; SUBCUTANEOUS at 16:57

## 2022-06-21 RX ADMIN — PROPOFOL 200 MG: 10 INJECTION, EMULSION INTRAVENOUS at 12:37

## 2022-06-21 RX ADMIN — DEXAMETHASONE SODIUM PHOSPHATE 4 MG: 4 INJECTION, SOLUTION INTRAMUSCULAR; INTRAVENOUS at 11:49

## 2022-06-21 RX ADMIN — CEFAZOLIN 2 G: 2 INJECTION, POWDER, FOR SOLUTION INTRAMUSCULAR; INTRAVENOUS at 12:35

## 2022-06-21 RX ADMIN — GABAPENTIN 600 MG: 300 CAPSULE ORAL at 10:44

## 2022-06-21 RX ADMIN — DEXAMETHASONE SODIUM PHOSPHATE 4 MG: 4 INJECTION, SOLUTION INTRAMUSCULAR; INTRAVENOUS at 12:43

## 2022-06-21 RX ADMIN — SODIUM CHLORIDE, SODIUM LACTATE, POTASSIUM CHLORIDE, AND CALCIUM CHLORIDE: .6; .31; .03; .02 INJECTION, SOLUTION INTRAVENOUS at 12:32

## 2022-06-21 RX ADMIN — SODIUM CHLORIDE, POTASSIUM CHLORIDE, SODIUM LACTATE AND CALCIUM CHLORIDE 9 ML/HR: 600; 310; 30; 20 INJECTION, SOLUTION INTRAVENOUS at 10:05

## 2022-06-21 RX ADMIN — LIDOCAINE HYDROCHLORIDE 50 MG: 10 INJECTION, SOLUTION EPIDURAL; INFILTRATION; INTRACAUDAL; PERINEURAL at 12:37

## 2022-06-21 RX ADMIN — Medication 100 MCG: at 12:59

## 2022-06-21 RX ADMIN — TAMSULOSIN HYDROCHLORIDE 0.4 MG: 0.4 CAPSULE ORAL at 22:11

## 2022-06-21 RX ADMIN — OXYCODONE HYDROCHLORIDE 10 MG: 10 TABLET, FILM COATED, EXTENDED RELEASE ORAL at 10:44

## 2022-06-21 RX ADMIN — TRANEXAMIC ACID 1000 MG: 10 INJECTION, SOLUTION INTRAVENOUS at 12:44

## 2022-06-21 RX ADMIN — MELOXICAM 15 MG: 15 TABLET ORAL at 10:44

## 2022-06-21 NOTE — ANESTHESIA PROCEDURE NOTES
Airway  Urgency: elective    Date/Time: 6/21/2022 12:38 PM  Airway not difficult    General Information and Staff    Patient location during procedure: OR  Anesthesiologist: Payam López MD  CRNA/CAA: Alexsandra Portillo CAA    Indications and Patient Condition  Indications for airway management: airway protection    Preoxygenated: yes  Mask difficulty assessment: 0 - not attempted    Final Airway Details  Final airway type: supraglottic airway      Successful airway: unique  Size 4    Number of attempts at approach: 1  Assessment: lips, teeth, and gum same as pre-op and atraumatic intubation

## 2022-06-21 NOTE — ANESTHESIA PROCEDURE NOTES
Peripheral Block      Patient reassessed immediately prior to procedure    Patient location during procedure: pre-op  Start time: 6/21/2022 11:59 AM  Stop time: 6/21/2022 12:09 PM  Reason for block: at surgeon's request and post-op pain management  Performed by  Anesthesiologist: Payam López MD  Preanesthetic Checklist  Completed: patient identified, IV checked, site marked, risks and benefits discussed, surgical consent, monitors and equipment checked, pre-op evaluation and timeout performed  Prep:  Pt Position: supine  Sterile barriers:cap, gloves, mask and sterile barriers  Prep: ChloraPrep  Patient monitoring: blood pressure monitoring, continuous pulse oximetry and EKG  Procedure    Sedation: yes    Guidance:ultrasound guided and direct live US visual of nerve, needle and location.    ULTRASOUND INTERPRETATION. Using ultrasound guidance a 20 G gauge needle was placed in close proximity to the nerve, at which point, under ultrasound guidance anesthetic was injected in the area of the nerve and spread of the anesthesia was seen on ultrasound in close proximity thereto.  There were no abnormalities seen on ultrasound; a digital image was taken; and the patient tolerated the procedure with no complications. Images:still images obtained, printed/placed on chart    Laterality:right  Block Type:adductor canal block  Injection Technique:single-shot  Needle Type:echogenic  Resistance on Injection: none  Sedation medications used: fentaNYL citrate (PF) (SUBLIMAZE) injection, 100 mcg  Medications Used: dexamethasone (DECADRON) injection, 4 mg  bupivacaine PF (MARCAINE) injection 0.5%, 20 mL      Post Assessment  Injection Assessment: negative aspiration for heme, no paresthesia on injection and incremental injection  Patient Tolerance:comfortable throughout block  Complications:no

## 2022-06-21 NOTE — ANESTHESIA PREPROCEDURE EVALUATION
Anesthesia Evaluation     Patient summary reviewed and Nursing notes reviewed   NPO Solid Status: > 8 hours  NPO Liquid Status: > 8 hours           Airway   Mallampati: I  TM distance: >3 FB  Neck ROM: full  No difficulty expected  Dental - normal exam     Pulmonary - negative pulmonary ROS and normal exam   Cardiovascular - normal exam    ECG reviewed    (+) hypertension,       Neuro/Psych  (+) numbness,    GI/Hepatic/Renal/Endo - negative ROS     Musculoskeletal (-) negative ROS    Abdominal  - normal exam    Bowel sounds: normal.   Substance History - negative use     OB/GYN negative ob/gyn ROS         Other                          Anesthesia Plan    ASA 2     general with block   total IV anesthesia  (Patient identified; pre-operative vital signs, all relevant labs/studies, complete medical/surgical/anesthetic history, full medication list, full allergy list, and NPO status obtained/reviewed; physical assessment performed; anesthetic options, side effects, potential complications, risks, and benefits discussed; questions answered; written anesthesia consent obtained; patient cleared for procedure; anesthesia machine and equipment checked and functioning)  intravenous induction     Anesthetic plan, risks, benefits, and alternatives have been provided, discussed and informed consent has been obtained with: patient.    Plan discussed with CAA.

## 2022-06-21 NOTE — ANESTHESIA POSTPROCEDURE EVALUATION
Patient: Nahum Restrepo    Procedure Summary     Date: 06/21/22 Room / Location: [facility] OR  / [facility] MAIN OR    Anesthesia Start: 1232 Anesthesia Stop: 1449    Procedure: TOTAL KNEE ARTHROPLASTY WITH MINGO ROBOT (Right Knee) Diagnosis:       Primary osteoarthritis of right knee      (Primary osteoarthritis of right knee [M17.11])    Surgeons: Km Felix MD Provider: Payam López MD    Anesthesia Type: general with block ASA Status: 2          Anesthesia Type: general with block    Vitals  Vitals Value Taken Time   /89 06/21/22 1542   Temp 97.7 °F (36.5 °C) 06/21/22 1449   Pulse 84 06/21/22 1542   Resp 12 06/21/22 1534   SpO2 95 % 06/21/22 1542   Vitals shown include unvalidated device data.        Post Anesthesia Care and Evaluation    Patient location during evaluation: PACU  Patient participation: complete - patient cannot participate  Level of consciousness: responsive to light touch, responsive to physical stimuli and responsive to verbal stimuli  Pain score: 0  Pain management: adequate    Airway patency: patent  Anesthetic complications: No anesthetic complications  PONV Status: controlled  Cardiovascular status: acceptable and hemodynamically stable  Respiratory status: acceptable and face mask  Hydration status: acceptable    Comments: Satisfactory progress.Patient seen and examined postoperatively; vital signs stable; SpO2 greater than or equal to 90%; cardiopulmonary status stable; nausea/vomiting adequately controlled; pain adequately controlled; no apparent anesthesia complications; patient discharged from anesthesia care when discharge criteria were met

## 2022-06-22 ENCOUNTER — HOME HEALTH ADMISSION (OUTPATIENT)
Dept: HOME HEALTH SERVICES | Facility: HOME HEALTHCARE | Age: 67
End: 2022-06-22

## 2022-06-22 ENCOUNTER — READMISSION MANAGEMENT (OUTPATIENT)
Dept: CALL CENTER | Facility: HOSPITAL | Age: 67
End: 2022-06-22

## 2022-06-22 VITALS
HEIGHT: 72 IN | BODY MASS INDEX: 28.49 KG/M2 | TEMPERATURE: 97.4 F | OXYGEN SATURATION: 97 % | HEART RATE: 95 BPM | WEIGHT: 210.32 LBS | RESPIRATION RATE: 16 BRPM | DIASTOLIC BLOOD PRESSURE: 84 MMHG | SYSTOLIC BLOOD PRESSURE: 144 MMHG

## 2022-06-22 LAB
ANION GAP SERPL CALCULATED.3IONS-SCNC: 11 MMOL/L (ref 5–15)
BUN SERPL-MCNC: 23 MG/DL (ref 8–23)
BUN/CREAT SERPL: 24.5 (ref 7–25)
CALCIUM SPEC-SCNC: 8.5 MG/DL (ref 8.6–10.5)
CHLORIDE SERPL-SCNC: 103 MMOL/L (ref 98–107)
CO2 SERPL-SCNC: 24 MMOL/L (ref 22–29)
CREAT SERPL-MCNC: 0.94 MG/DL (ref 0.76–1.27)
EGFRCR SERPLBLD CKD-EPI 2021: 88.9 ML/MIN/1.73
GLUCOSE SERPL-MCNC: 179 MG/DL (ref 65–99)
HCT VFR BLD AUTO: 35.8 % (ref 37.5–51)
HGB BLD-MCNC: 12.2 G/DL (ref 13–17.7)
POTASSIUM SERPL-SCNC: 4.5 MMOL/L (ref 3.5–5.2)
SODIUM SERPL-SCNC: 138 MMOL/L (ref 136–145)

## 2022-06-22 PROCEDURE — 63710000001 VALSARTAN 80 MG TABLET: Performed by: INTERNAL MEDICINE

## 2022-06-22 PROCEDURE — G0378 HOSPITAL OBSERVATION PER HR: HCPCS

## 2022-06-22 PROCEDURE — 97150 GROUP THERAPEUTIC PROCEDURES: CPT

## 2022-06-22 PROCEDURE — 25010000002 CEFAZOLIN PER 500 MG: Performed by: ORTHOPAEDIC SURGERY

## 2022-06-22 PROCEDURE — 63710000001 OXYCODONE 5 MG TABLET: Performed by: ORTHOPAEDIC SURGERY

## 2022-06-22 PROCEDURE — 63710000001 RIVAROXABAN 10 MG TABLET: Performed by: ORTHOPAEDIC SURGERY

## 2022-06-22 PROCEDURE — A9270 NON-COVERED ITEM OR SERVICE: HCPCS | Performed by: INTERNAL MEDICINE

## 2022-06-22 PROCEDURE — A9270 NON-COVERED ITEM OR SERVICE: HCPCS | Performed by: ORTHOPAEDIC SURGERY

## 2022-06-22 PROCEDURE — 99024 POSTOP FOLLOW-UP VISIT: CPT | Performed by: ORTHOPAEDIC SURGERY

## 2022-06-22 PROCEDURE — 85018 HEMOGLOBIN: CPT | Performed by: ORTHOPAEDIC SURGERY

## 2022-06-22 PROCEDURE — 63710000001 AMLODIPINE 5 MG TABLET: Performed by: ORTHOPAEDIC SURGERY

## 2022-06-22 PROCEDURE — 85014 HEMATOCRIT: CPT | Performed by: ORTHOPAEDIC SURGERY

## 2022-06-22 PROCEDURE — 97116 GAIT TRAINING THERAPY: CPT

## 2022-06-22 PROCEDURE — 80048 BASIC METABOLIC PNL TOTAL CA: CPT | Performed by: ORTHOPAEDIC SURGERY

## 2022-06-22 PROCEDURE — 63710000001 METOPROLOL SUCCINATE XL 50 MG TABLET SUSTAINED-RELEASE 24 HOUR: Performed by: ORTHOPAEDIC SURGERY

## 2022-06-22 PROCEDURE — 63710000001 MELOXICAM 15 MG TABLET: Performed by: ORTHOPAEDIC SURGERY

## 2022-06-22 PROCEDURE — 99213 OFFICE O/P EST LOW 20 MIN: CPT | Performed by: INTERNAL MEDICINE

## 2022-06-22 RX ORDER — HYDRALAZINE HYDROCHLORIDE 20 MG/ML
10 INJECTION INTRAMUSCULAR; INTRAVENOUS EVERY 6 HOURS PRN
Status: DISCONTINUED | OUTPATIENT
Start: 2022-06-22 | End: 2022-06-22 | Stop reason: HOSPADM

## 2022-06-22 RX ORDER — VALSARTAN 80 MG/1
320 TABLET ORAL
Status: DISCONTINUED | OUTPATIENT
Start: 2022-06-22 | End: 2022-06-22 | Stop reason: HOSPADM

## 2022-06-22 RX ORDER — GABAPENTIN 300 MG/1
300 CAPSULE ORAL NIGHTLY
Status: DISCONTINUED | OUTPATIENT
Start: 2022-06-22 | End: 2022-06-22 | Stop reason: HOSPADM

## 2022-06-22 RX ADMIN — AMLODIPINE BESYLATE 5 MG: 5 TABLET ORAL at 08:26

## 2022-06-22 RX ADMIN — RIVAROXABAN 10 MG: 10 TABLET, FILM COATED ORAL at 08:26

## 2022-06-22 RX ADMIN — OXYCODONE 10 MG: 5 TABLET ORAL at 06:36

## 2022-06-22 RX ADMIN — MELOXICAM 15 MG: 15 TABLET ORAL at 08:26

## 2022-06-22 RX ADMIN — OXYCODONE 10 MG: 5 TABLET ORAL at 00:43

## 2022-06-22 RX ADMIN — VALSARTAN 320 MG: 80 TABLET, FILM COATED ORAL at 08:26

## 2022-06-22 RX ADMIN — CEFAZOLIN 2 G: 2 INJECTION, POWDER, FOR SOLUTION INTRAMUSCULAR; INTRAVENOUS at 06:33

## 2022-06-22 RX ADMIN — METOPROLOL SUCCINATE 50 MG: 50 TABLET, EXTENDED RELEASE ORAL at 08:25

## 2022-06-22 NOTE — OUTREACH NOTE
Prep Survey    Flowsheet Row Responses   [facility] facility patient discharged from? [facility]   Is LACE score < 7 ? Yes   Emergency Room discharge w/ pulse ox? No   Eligibility TCM   Hospital [facility]   Date of Admission 06/21/22   Date of Discharge 06/22/22   Discharge Disposition Home or Self Care   Discharge diagnosis Total knee arthro   Does the patient have one of the following disease processes/diagnoses(primary or secondary)? Total Joint Replacement   Does the patient have Home health ordered? Yes   What is the Home health agency?  [facility]   Is there a DME ordered? Yes   What DME was ordered? [place] for walker   Prep survey completed? Yes          ANDREA DELUCA - Registered Nurse

## 2022-06-23 ENCOUNTER — HOME CARE VISIT (OUTPATIENT)
Dept: HOME HEALTH SERVICES | Facility: HOME HEALTHCARE | Age: 67
End: 2022-06-23

## 2022-06-23 ENCOUNTER — TRANSITIONAL CARE MANAGEMENT TELEPHONE ENCOUNTER (OUTPATIENT)
Dept: CALL CENTER | Facility: HOSPITAL | Age: 67
End: 2022-06-23

## 2022-06-23 VITALS
SYSTOLIC BLOOD PRESSURE: 130 MMHG | TEMPERATURE: 45.9 F | DIASTOLIC BLOOD PRESSURE: 70 MMHG | WEIGHT: 207 LBS | OXYGEN SATURATION: 97 % | BODY MASS INDEX: 28.04 KG/M2 | RESPIRATION RATE: 18 BRPM | HEIGHT: 72 IN | HEART RATE: 96 BPM

## 2022-06-23 PROCEDURE — G0151 HHCP-SERV OF PT,EA 15 MIN: HCPCS

## 2022-06-23 NOTE — OUTREACH NOTE
Call Center TCM Note    Flowsheet Row Responses   [facility] patient discharged from? [facility]   Does the patient have one of the following disease processes/diagnoses(primary or secondary)? Total Joint Replacement   Joint surgery performed? Knee   TCM attempt successful? Yes   Has the patient been back in either the hospital or Emergency Department since discharge? No   Discharge diagnosis Total knee arthro   Does the patient have all medications related to this admission filled (includes all antibiotics, pain medications, etc.) Yes   Is the patient taking all medications as directed (includes completed medication regime)? Yes   Is the patient able to teach back alternate methods of pain control? Ice, Reposition, Correct alignment, Knee-elevation/no pillow under knee, Short, frequent activity   Does the patient have a follow up appointment with their surgeon? Yes   Has the patient kept scheduled appointments due by today? N/A   What is the Home health agency?  [facility]   Home health comments In chart, [facility] sched today but pt has not had call yet for arrival time.    What DME was ordered? [place] for walker   Has all DME been delivered? Yes   Psychosocial issues? No   Has the patient began therapy sessions (either in the home or as an out patient)? No   Does the patient have a wound vac in place? N/A   Has the patient fallen since discharge? No   Did the patient receive a copy of their discharge instructions? Yes   Nursing interventions Reviewed instructions with patient   What is the patient's perception of their functional status since discharge? Improving   Is the patient able to teach back signs and symptoms of infection? Temp >100.4 for 24h or longer, Blisters around incision, Severe discomfort or pain, Shortness of breath or chest pain, Changes in mobility, Increased swelling or redness around incision (not associated with surgical edema), Incisional drainage   Is the patient able to teach back how to  prevent infection? Check incision daily, Shower only as directed by surgeon, No lotion or creams, Monitor blood sugar if diabetic, Wash hands before and after touching incision, Keep incision covered if drainage, Eat well-balanced diet, No tub baths, hot tub or swimming, Keep incision covered if pets in house   Is the patient able to teach back signs and symptoms of DVT? Redness in calf, Swelling in calf, Area hot to touch, Severe pain in calf, Shortness of breath or chest pain   Is the patient able to teach back home safety measures? Modifications to reach items, Accessibility to necessary areas in home, Ability to shower, Modifications with ADLs such as dressing, cooking, toileting   Did the patient implement home safety suggestions from pre-surgery classes if attended? N/A   If the patient is a current smoker, are they able to teach back resources for cessation? Not a smoker   Is the patient/caregiver able to teach back the hierarchy of who to call/visit for symptoms/problems? PCP, Specialist, Home health nurse, Urgent Care, ED, 911 Yes   TCM call completed? Yes   Wrap up additional comments Pt doing ok s/p TKR RT. Moderate pain controlled by Oxycodone as directed. Zofran & Xarelto also in place. Walker delivered.  In chart, [facility] sched today but pt has not had call yet for arrival time. Pt has their contact info if needed. Addressed ice/elevation & DVT/PE signs/protocols. First POST OP is 06/29/2022, pt will follow up ith PCP Dr Leonardo Lujan when further recovered.           Lisa Nino MA    6/23/2022, 13:10 EDT

## 2022-06-24 NOTE — CASE COMMUNICATION
PT SOC completed 6/23/2022 - Need for Clinical Manager/RN review of medciations.    There is a potential interaction Drug-Drug: diclofenac and rivaroxaban  Use of rivaroxaban with diclofenac may increase the risk of bleeding.    This is a know possible interaction with managment consisting of education on bleeding risks (as accomplished today).

## 2022-06-24 NOTE — CASE COMMUNICATION
Patient has need for 6 PT visits.  PT will need to ensure order obtained to transition patient to OP PT.

## 2022-06-24 NOTE — CASE COMMUNICATION
"[facility] – HUDDLE START OF CARE    Caregiver Status: friend, sister  Availability:as needed  Ability to meet patient's needs:good  Willingness:very good    Risk for Hospitalization: HIGH    Patient stated goal: \"Be able to walk without a limp.\"    Services required to achieve goals: PT    Potential Issues for goal attainment:   None    Problems identified:  The patient is a 66 year old male admitted to home health services by PT this day  (6/23/2022) following right total knee arthroplasty by Dr. Km Felix MD on 6/21/2022.    Primary reason for home health services: aftercare following  right total knee arthroplasty.    Knowledge deficits:  Exercise and gait progression after TKR    Functional status and safety:  PT assessment this day (6/23/2022) reveals the problems of right knee pain (up to 10/10), right knee stiffness (15-70 degrees passively), impaired bed mobi lity (minimal assistance required), impaired transfers (minimal assistance required), abnormal gait (poor use of walker, antalgic gait), limited ambulation ability/tolerance (50 feet with walker and need for minimal assistance), imbalance (Tinetti Balance Assessment score 10/28 - high falls risk).     Any Duplication of Services:no    Environmental issues:  none    Equipment/Adjunct Services:  Devices for care present in the home: RWx   Devices needed and not present: n/a    Community resources involved/needed:  n/a    Any additional needs: no    Based upon record review and collaboration conference, the recommended frequency for this patient is:     SN-----    PT----- 1WK1, 3WK1, 2WK1    OT----    ST-----    HHA---    MSW---         Please note that above is a recommendation only and that the plan of care should reflect the patient's clinical needs and goals. If in ag reement, please complete note. If a different frequency is necessary, please explain in note box and proceed per patients clinical needs.   "

## 2022-06-24 NOTE — CASE COMMUNICATION
Per Home Care guidelines we are required to report drug to drug interactions on review of this patient medications a major interaction was noted as listed below.     Drug-Drug: diclofenac and rivaroxaban   Use of rivaroxaban with diclofenac may increase the risk of bleeding.    Patient was educated on bleeding risk and verbalized understanding    Thank you  Aleyda TYSONN  Clinical Manager [facility]  Office [phone]  Cell [phone]  Fax [phone]  [email or web address]

## 2022-06-27 ENCOUNTER — HOME CARE VISIT (OUTPATIENT)
Dept: HOME HEALTH SERVICES | Facility: HOME HEALTHCARE | Age: 67
End: 2022-06-27

## 2022-06-27 PROCEDURE — G0157 HHC PT ASSISTANT EA 15: HCPCS

## 2022-06-28 VITALS
SYSTOLIC BLOOD PRESSURE: 126 MMHG | TEMPERATURE: 97.8 F | HEART RATE: 83 BPM | OXYGEN SATURATION: 99 % | DIASTOLIC BLOOD PRESSURE: 78 MMHG

## 2022-06-29 ENCOUNTER — HOME CARE VISIT (OUTPATIENT)
Dept: HOME HEALTH SERVICES | Facility: HOME HEALTHCARE | Age: 67
End: 2022-06-29

## 2022-06-29 ENCOUNTER — OFFICE VISIT (OUTPATIENT)
Dept: ORTHOPEDIC SURGERY | Facility: CLINIC | Age: 67
End: 2022-06-29

## 2022-06-29 VITALS
DIASTOLIC BLOOD PRESSURE: 64 MMHG | HEIGHT: 72 IN | BODY MASS INDEX: 28.04 KG/M2 | SYSTOLIC BLOOD PRESSURE: 109 MMHG | HEART RATE: 99 BPM | WEIGHT: 207 LBS

## 2022-06-29 DIAGNOSIS — Z96.651 HX OF TOTAL KNEE REPLACEMENT, RIGHT: Primary | ICD-10-CM

## 2022-06-29 DIAGNOSIS — E66.3 OVERWEIGHT (BMI 25.0-29.9): ICD-10-CM

## 2022-06-29 PROCEDURE — 99024 POSTOP FOLLOW-UP VISIT: CPT | Performed by: PHYSICIAN ASSISTANT

## 2022-06-29 PROCEDURE — G0157 HHC PT ASSISTANT EA 15: HCPCS

## 2022-06-29 RX ORDER — OXYCODONE AND ACETAMINOPHEN 7.5; 325 MG/1; MG/1
TABLET ORAL
Qty: 40 TABLET | Refills: 0 | Status: SHIPPED | OUTPATIENT
Start: 2022-06-29 | End: 2022-08-18

## 2022-06-29 NOTE — PATIENT INSTRUCTIONS
Total Knee Replacement, Care After  After the procedure, it is common to have stiffness and discomfort, or redness, pain, and swelling around your cut from surgery (incision). You may also have a small amount of blood or clear fluid coming from your incision.  Follow these instructions at home:  Your doctor may give you more instructions. If you have problems, contact your doctor.  Medicines  Take over-the-counter and prescription medicines only as told by your doctor.  If you were prescribed a blood thinner, take it as told by your doctor.  If told, take steps to prevent problems with pooping (constipation). You may need to:  Drink enough fluid to keep your pee (urine) pale yellow.  Take medicines. You will be told what medicines to take.  Eat foods that are high in fiber. These include beans, whole grains, and fresh fruits and vegetables.  Limit foods that are high in fat and sugar. These include fried or sweet foods.  Incision care    Follow instructions from your doctor about how to take care of your incision. Make sure you:  Wash your hands with soap and water for at least 20 seconds before and after you change your bandage. If you cannot use soap and water, use hand .  Change your bandage.  Leave stitches, staples, or skin glue in place for at least 2 weeks.  Leave tape strips alone unless you are told to take them off. You may trim the edges of the tape strips if they curl up.  Do not take baths, swim, or use a hot tub until your doctor says it is okay.  Check your incision every day for signs of infection. Check for:  More redness, swelling, or pain.  More fluid or blood.  Warmth.  Pus or a bad smell.    Activity  Rest as told by your doctor.  Get up to take short walks every 1 to 2 hours. Ask for help if you feel weak or unsteady.  Follow instructions from your doctor about:  Using a walker, crutches, or a cane.  How much body weight you may safely support on your affected leg (weight-bearing  restrictions).  How to get out of a bed and chair and how to go up and down stairs. A physical therapist will show you how to do this.  Do exercises as told by your doctor or physical therapist.  Avoid activities that put stress on your knees. These include running, jumping rope, and doing jumping jacks.  Do not play contact sports until your doctor says it is okay.  Return to your normal activities when your doctor says that it is safe.  Managing pain, stiffness, and swelling    If told, put ice on your knee. To do this:  Put ice in a plastic bag or use an icing device (cold flow pad). Follow your doctor's directions about how to use the icing device.  Place a towel between your skin and the bag or between your skin and the icing device.  Leave the ice on for 20 minutes, 2-3 times a day.  Take off the ice if your skin turns bright red. This is very important. If you cannot feel pain, heat, or cold, you have a greater risk of damage to the area.  Move your toes often.  Raise your leg above the level of your heart while you are sitting or lying down.  Use a few pillows to keep your leg straight.  Do not put a pillow just under the knee. If the knee is bent for a long time, this may make the knee stiff.  Wear elastic knee support as told by your doctor.    Safety    To help prevent falls:  Keep floors clear of objects you may trip over.  Place items that you may need within easy reach.  Wear an apron or tool belt with pockets for carrying objects. This leaves your hands free to help with your balance.  Do not drive or use machines until your doctor says it is safe.    General instructions  Wear compression stockings as told by your doctor.  Continue with breathing exercises. This helps prevent lung infection.  Do not smoke or use any products that contain nicotine or tobacco. If you need help quitting, ask your doctor.  If you plan to visit a dentist:  Tell your doctor. Ask about things to do before your teeth are  cleaned.  Tell your dentist about your new joint.  Keep all follow-up visits.  Contact a doctor if:  You have a fever or chills.  You have a cough or feel short of breath.  Your medicine is not controlling your pain.  You have any of these signs of infection around your incision:  More redness, swelling, or pain.  More fluid or blood.  Warmth.  Pus or a bad smell.  You fall.  Get help right away if:  You have very bad pain.  You have trouble breathing.  You have chest pain.  You have pain in your calf or leg.  You have redness, swelling, or warmth in your calf or leg.  Your incision breaks open after the stitches or staples are taken out.  These symptoms may be an emergency. Get help right away. Call your local emergency services (911 in the [place]  Do not wait to see if the symptoms will go away.  Do not drive yourself to the hospital.  Summary  After the procedure, it is common to have stiffness and discomfort, or redness, pain, and swelling around your incision.  Follow instructions from your doctor about how to take care of your incision.  Use crutches, a walker, or a cane as told by your doctor.  If you were prescribed a blood thinner, take it as told by your doctor.  Keep all follow-up visits.  This information is not intended to replace advice given to you by your health care provider. Make sure you discuss any questions you have with your health care provider.  Document Revised: 06/08/2021 Document Reviewed: 06/08/2021  [employer] Patient Education © 2021 [employer]

## 2022-06-30 VITALS
SYSTOLIC BLOOD PRESSURE: 126 MMHG | DIASTOLIC BLOOD PRESSURE: 76 MMHG | OXYGEN SATURATION: 98 % | HEART RATE: 76 BPM | TEMPERATURE: 97.8 F

## 2022-07-01 ENCOUNTER — HOME CARE VISIT (OUTPATIENT)
Dept: HOME HEALTH SERVICES | Facility: HOME HEALTHCARE | Age: 67
End: 2022-07-01

## 2022-07-01 PROCEDURE — G0157 HHC PT ASSISTANT EA 15: HCPCS

## 2022-07-02 VITALS
SYSTOLIC BLOOD PRESSURE: 134 MMHG | OXYGEN SATURATION: 94 % | HEART RATE: 101 BPM | TEMPERATURE: 96.6 F | DIASTOLIC BLOOD PRESSURE: 82 MMHG

## 2022-07-05 ENCOUNTER — HOME CARE VISIT (OUTPATIENT)
Dept: HOME HEALTH SERVICES | Facility: HOME HEALTHCARE | Age: 67
End: 2022-07-05

## 2022-07-05 VITALS
DIASTOLIC BLOOD PRESSURE: 85 MMHG | SYSTOLIC BLOOD PRESSURE: 130 MMHG | TEMPERATURE: 97.8 F | HEART RATE: 78 BPM | RESPIRATION RATE: 18 BRPM | OXYGEN SATURATION: 98 %

## 2022-07-05 PROCEDURE — G0151 HHCP-SERV OF PT,EA 15 MIN: HCPCS

## 2022-07-05 PROCEDURE — G0180 MD CERTIFICATION HHA PATIENT: HCPCS | Performed by: ORTHOPAEDIC SURGERY

## 2022-07-05 NOTE — HOME HEALTH
"Pt reports feeling well today but rates pain 5/10 in R knee.  Pt states \"pain is getting better, not as bad as it has been\".  Pt conts to be compliant with use of ace wrap and has good edema management.  Pt tolerated all mobility and ther ex well this date with no increased complaints. Pt has MD follow up on Thursday 07/07/22"

## 2022-07-06 ENCOUNTER — HOME CARE VISIT (OUTPATIENT)
Dept: HOME HEALTH SERVICES | Facility: HOME HEALTHCARE | Age: 67
End: 2022-07-06

## 2022-07-06 PROCEDURE — G0151 HHCP-SERV OF PT,EA 15 MIN: HCPCS

## 2022-07-07 ENCOUNTER — OFFICE VISIT (OUTPATIENT)
Dept: ORTHOPEDIC SURGERY | Facility: CLINIC | Age: 67
End: 2022-07-07

## 2022-07-07 VITALS
HEIGHT: 72 IN | RESPIRATION RATE: 18 BRPM | SYSTOLIC BLOOD PRESSURE: 120 MMHG | DIASTOLIC BLOOD PRESSURE: 74 MMHG | HEART RATE: 90 BPM | WEIGHT: 207 LBS | BODY MASS INDEX: 28.04 KG/M2 | OXYGEN SATURATION: 95 %

## 2022-07-07 VITALS
RESPIRATION RATE: 18 BRPM | OXYGEN SATURATION: 98 % | HEART RATE: 90 BPM | DIASTOLIC BLOOD PRESSURE: 62 MMHG | TEMPERATURE: 97.9 F | SYSTOLIC BLOOD PRESSURE: 110 MMHG

## 2022-07-07 DIAGNOSIS — E66.3 OVERWEIGHT (BMI 25.0-29.9): ICD-10-CM

## 2022-07-07 DIAGNOSIS — Z96.651 S/P TKR (TOTAL KNEE REPLACEMENT), RIGHT: Primary | ICD-10-CM

## 2022-07-07 PROCEDURE — 99024 POSTOP FOLLOW-UP VISIT: CPT | Performed by: PHYSICIAN ASSISTANT

## 2022-07-07 NOTE — HOME HEALTH
Patient reported compliance with range of motion exs to rt knee and good pain control with ice therapy. Functionally, he is independent in all household transfers and able to ambulate more than 300ft safely and independently outdoor with std cane. All goals met and patient agreed with PT discharge this visit.

## 2022-07-07 NOTE — PATIENT INSTRUCTIONS
Total Knee Replacement, Care After  After the procedure, it is common to have stiffness and discomfort, or redness, pain, and swelling around your cut from surgery (incision). You may also have a small amount of blood or clear fluid coming from your incision.  Follow these instructions at home:  Your doctor may give you more instructions. If you have problems, contact your doctor.  Medicines  Take over-the-counter and prescription medicines only as told by your doctor.  If you were prescribed a blood thinner, take it as told by your doctor.  If told, take steps to prevent problems with pooping (constipation). You may need to:  Drink enough fluid to keep your pee (urine) pale yellow.  Take medicines. You will be told what medicines to take.  Eat foods that are high in fiber. These include beans, whole grains, and fresh fruits and vegetables.  Limit foods that are high in fat and sugar. These include fried or sweet foods.  Incision care    Follow instructions from your doctor about how to take care of your incision. Make sure you:  Wash your hands with soap and water for at least 20 seconds before and after you change your bandage. If you cannot use soap and water, use hand .  Change your bandage.  Leave stitches, staples, or skin glue in place for at least 2 weeks.  Leave tape strips alone unless you are told to take them off. You may trim the edges of the tape strips if they curl up.  Do not take baths, swim, or use a hot tub until your doctor says it is okay.  Check your incision every day for signs of infection. Check for:  More redness, swelling, or pain.  More fluid or blood.  Warmth.  Pus or a bad smell.    Activity  Rest as told by your doctor.  Get up to take short walks every 1 to 2 hours. Ask for help if you feel weak or unsteady.  Follow instructions from your doctor about:  Using a walker, crutches, or a cane.  How much body weight you may safely support on your affected leg (weight-bearing  restrictions).  How to get out of a bed and chair and how to go up and down stairs. A physical therapist will show you how to do this.  Do exercises as told by your doctor or physical therapist.  Avoid activities that put stress on your knees. These include running, jumping rope, and doing jumping jacks.  Do not play contact sports until your doctor says it is okay.  Return to your normal activities when your doctor says that it is safe.  Managing pain, stiffness, and swelling    If told, put ice on your knee. To do this:  Put ice in a plastic bag or use an icing device (cold flow pad). Follow your doctor's directions about how to use the icing device.  Place a towel between your skin and the bag or between your skin and the icing device.  Leave the ice on for 20 minutes, 2-3 times a day.  Take off the ice if your skin turns bright red. This is very important. If you cannot feel pain, heat, or cold, you have a greater risk of damage to the area.  Move your toes often.  Raise your leg above the level of your heart while you are sitting or lying down.  Use a few pillows to keep your leg straight.  Do not put a pillow just under the knee. If the knee is bent for a long time, this may make the knee stiff.  Wear elastic knee support as told by your doctor.    Safety    To help prevent falls:  Keep floors clear of objects you may trip over.  Place items that you may need within easy reach.  Wear an apron or tool belt with pockets for carrying objects. This leaves your hands free to help with your balance.  Do not drive or use machines until your doctor says it is safe.    General instructions  Wear compression stockings as told by your doctor.  Continue with breathing exercises. This helps prevent lung infection.  Do not smoke or use any products that contain nicotine or tobacco. If you need help quitting, ask your doctor.  If you plan to visit a dentist:  Tell your doctor. Ask about things to do before your teeth are  cleaned.  Tell your dentist about your new joint.  Keep all follow-up visits.  Contact a doctor if:  You have a fever or chills.  You have a cough or feel short of breath.  Your medicine is not controlling your pain.  You have any of these signs of infection around your incision:  More redness, swelling, or pain.  More fluid or blood.  Warmth.  Pus or a bad smell.  You fall.  Get help right away if:  You have very bad pain.  You have trouble breathing.  You have chest pain.  You have pain in your calf or leg.  You have redness, swelling, or warmth in your calf or leg.  Your incision breaks open after the stitches or staples are taken out.  These symptoms may be an emergency. Get help right away. Call your local emergency services (911 in the [place]  Do not wait to see if the symptoms will go away.  Do not drive yourself to the hospital.  Summary  After the procedure, it is common to have stiffness and discomfort, or redness, pain, and swelling around your incision.  Follow instructions from your doctor about how to take care of your incision.  Use crutches, a walker, or a cane as told by your doctor.  If you were prescribed a blood thinner, take it as told by your doctor.  Keep all follow-up visits.  This information is not intended to replace advice given to you by your health care provider. Make sure you discuss any questions you have with your health care provider.  Document Revised: 06/08/2021 Document Reviewed: 06/08/2021  [employer] Patient Education © 2021 [employer] Inc.

## 2022-07-07 NOTE — PROGRESS NOTES
"    ORTHO POSTOP VISIT       Subjective:    HPI:  Nahum Restrepo is a 66 y.o. male who presents about 2 weeks out from having a right total knee replacement.  He reports he is doing well with mild intermittent discomfort.    Medications:    Current Outpatient Medications:   •  alfuzosin (UROXATRAL) 10 MG 24 hr tablet, Take 10 mg by mouth Daily., Disp: , Rfl:   •  ALPRAZolam (XANAX) 0.5 MG tablet, TAKE 1 TABLET BY MOUTH TWICE DAILY AS NEEDED, Disp: 60 tablet, Rfl: 3  •  amLODIPine (NORVASC) 5 MG tablet, Take 1 tablet by mouth Daily., Disp: 90 tablet, Rfl: 3  •  diclofenac (VOLTAREN) 75 MG EC tablet, TAKE 1 TABLET BY MOUTH TWICE DAILY AS NEEDED FOR JOINT PAIN, Disp: 60 tablet, Rfl: 11  •  metoprolol succinate XL (TOPROL-XL) 50 MG 24 hr tablet, Take 1 tablet by mouth Daily., Disp: 90 tablet, Rfl: 3  •  multivitamin with minerals tablet tablet, Take 1 tablet by mouth Daily., Disp: , Rfl:   •  oxyCODONE-acetaminophen (PERCOCET) 7.5-325 MG per tablet, TAKE ONE TABLET BY MOUTH EVERY 4-6 HOURS AS NEEDED FOR PAIN, Disp: 40 tablet, Rfl: 0  •  rivaroxaban (Xarelto) 10 MG tablet, Take 1 tablet by mouth Daily., Disp: 10 tablet, Rfl: 0  •  valsartan-hydrochlorothiazide (DIOVAN-HCT) 320-25 MG per tablet, Take 1 tablet by mouth Daily for 90 days., Disp: 90 tablet, Rfl: 3  Current outpatient and discharge medications have been reconciled for the patient.  Reviewed by: DELROY Khan      Allergies:  No Known Allergies       Objective   Objective:    /74   Pulse 90   Resp 18   Ht 182.9 cm (72\")   Wt 93.9 kg (207 lb)   SpO2 95%   BMI 28.07 kg/m²     Physical Examination:  Alert, oriented, overweight individual in no acute distress, ambulating with the assistance of a cane  Right lower extremity shows a well-healing surgical incision with no erythema, drainage, or open skin lesions. There is a mild amount of swelling. It is grossly well aligned, and the patient is neurovascularly intact distally. The knee is stable " "to varus and valgus stress, there is no patellar maltracking or crepitus noted, and plantar and dorsiflexion is 5/5. There is mild tenderness to palpation and with range of motion, which is about 2-87.           Imaging:  xrays obtained today  right Knee X-Ray    Date of exam: 7/7/2022    Indication: 2 weeks status post right total knee replacement    AP, Lateral, Moonshine views    Findings:A well positioned knee replacement without evidence of bony or implant failure., No fractures or dislocations are appreciated and no significant changes when compared to previous imaging.    within normal limits joint spaces    Hardware appropriately positioned yes    yes prior studies available for comparison.    This patient's x-ray report was graded according to the Kellgren and Momo classification.  This took into account the joint space narrowing, osteophyte formation, sclerosis of the distal femur/proximal tibia along with deformity of those bones.  The findings were indicative of K L grade na.    X-RAY was ordered and reviewed by DELROY Khan            Assessment:  1. S/P TKR (total knee replacement), right    2. Overweight (BMI 25.0-29.9)       About 2 weeks out from surgery        Plan:  At this time, we will plan to see the patient back in about 6 weeks. The patient should continue PT, WBAT, and call with any problems.  He will be given an order to start outpatient physical therapy.             DELROY Khan  07/07/22  10:52 EDT    \"Please note that portions of this note were completed with a voice recognition program\".                   "

## 2022-07-13 ENCOUNTER — TREATMENT (OUTPATIENT)
Dept: PHYSICAL THERAPY | Facility: CLINIC | Age: 67
End: 2022-07-13

## 2022-07-13 DIAGNOSIS — Z96.651 HISTORY OF TOTAL RIGHT KNEE REPLACEMENT: Primary | ICD-10-CM

## 2022-07-13 PROCEDURE — 97140 MANUAL THERAPY 1/> REGIONS: CPT | Performed by: PHYSICAL THERAPIST

## 2022-07-13 PROCEDURE — 97161 PT EVAL LOW COMPLEX 20 MIN: CPT | Performed by: PHYSICAL THERAPIST

## 2022-07-13 PROCEDURE — 97110 THERAPEUTIC EXERCISES: CPT | Performed by: PHYSICAL THERAPIST

## 2022-07-15 ENCOUNTER — TREATMENT (OUTPATIENT)
Dept: PHYSICAL THERAPY | Facility: CLINIC | Age: 67
End: 2022-07-15

## 2022-07-15 DIAGNOSIS — Z96.651 HISTORY OF TOTAL RIGHT KNEE REPLACEMENT: Primary | ICD-10-CM

## 2022-07-15 DIAGNOSIS — M25.561 ACUTE PAIN OF RIGHT KNEE: ICD-10-CM

## 2022-07-15 PROCEDURE — 97110 THERAPEUTIC EXERCISES: CPT | Performed by: PHYSICAL THERAPIST

## 2022-07-15 NOTE — PROGRESS NOTES
Physical Therapy Daily Progress Note      Patient: Nahum Restrepo   : 1955  Diagnosis/ICD-10 Code:  History of total right knee replacement [Z96.651]  Referring practitioner: DELROY Khan  Date of Initial Visit: Type: THERAPY  Noted: 2022  Today's Date: 7/15/2022  Patient seen for 2 sessions             Subjective Pt says most knee pain occurs when he stands from sitting position and takes his first step.    Objective   See Exercise, Manual, and Modality Logs for complete treatment.       Assessment/Plan  Notable swelling in knee that hinders ROM improvements.  Tolerated progression and addition of ROM/flexibilty and strengthening exercises fairly well overall.    Progress per Plan of Care           Timed:         Manual Therapy:    5     mins  73080;     Therapeutic Exercise:    38     mins  23825;         Timed Treatment:   43   mins   Total Treatment:     43   mins        Robert Mederos PTA  Physical Therapist Assistant

## 2022-07-20 ENCOUNTER — TREATMENT (OUTPATIENT)
Dept: PHYSICAL THERAPY | Facility: CLINIC | Age: 67
End: 2022-07-20

## 2022-07-20 DIAGNOSIS — M25.561 ACUTE PAIN OF RIGHT KNEE: ICD-10-CM

## 2022-07-20 DIAGNOSIS — Z96.651 HISTORY OF TOTAL RIGHT KNEE REPLACEMENT: Primary | ICD-10-CM

## 2022-07-20 DIAGNOSIS — Z98.890 S/P RIGHT KNEE ARTHROSCOPY: ICD-10-CM

## 2022-07-20 PROCEDURE — 97110 THERAPEUTIC EXERCISES: CPT | Performed by: PHYSICAL THERAPIST

## 2022-07-20 NOTE — PROGRESS NOTES
Physical Therapy Daily Treatment Note    VISIT#: 3    Subjective   Nahum Restrepo reports that his knee is doing well, no new complaints.       Objective     See Exercise, Manual, and Modality Logs for complete treatment.     Patient Education: exercise progressions    Assessment/Plan   Pt was 15 mins late to session and requested to end early due to another appt. Billable treatment time was limited due to pt overlap. Pt exhibits good tolerance to exercise and was able to add LAQ today. Plan to progress next session as able.     Progress per Plan of Care and Progress strengthening /stabilization /functional activity          Timed:         Manual Therapy:         mins  94141;     Therapeutic Exercise:    18     mins  39521;     Neuromuscular Leanna:        mins  76245;    Therapeutic Activity:          mins  49431;     Gait Training:           mins  17816;     Ultrasound:          mins  49206;    Ionto                                   mins   88829  Self Care                            mins   03966  Canalith Repos                   mins  12855    Un-Timed:  Electrical Stimulation:         mins  83619 ( );  Dry Needling          mins self-pay  Traction          mins 07338  Low Eval          Mins  17880  Mod Eval          Mins  28756  High Eval                            Mins  65307  Re-Eval                               mins  19770    Timed Treatment:   18   mins   Total Treatment:     26   mins          Shanthi James  Physical Therapist Assistant  [place] License # [id number]

## 2022-07-25 ENCOUNTER — TREATMENT (OUTPATIENT)
Dept: PHYSICAL THERAPY | Facility: CLINIC | Age: 67
End: 2022-07-25

## 2022-07-25 DIAGNOSIS — M25.561 ACUTE PAIN OF RIGHT KNEE: ICD-10-CM

## 2022-07-25 DIAGNOSIS — Z96.651 HISTORY OF TOTAL RIGHT KNEE REPLACEMENT: Primary | ICD-10-CM

## 2022-07-25 PROCEDURE — 97530 THERAPEUTIC ACTIVITIES: CPT | Performed by: PHYSICAL THERAPIST

## 2022-07-25 PROCEDURE — 97112 NEUROMUSCULAR REEDUCATION: CPT | Performed by: PHYSICAL THERAPIST

## 2022-07-25 PROCEDURE — 97110 THERAPEUTIC EXERCISES: CPT | Performed by: PHYSICAL THERAPIST

## 2022-07-25 NOTE — PROGRESS NOTES
Physical Therapy Daily Treatment Note    VISIT#: 4    Subjective   Nahum Restrepo reports that he is doing well, still has some swelling around the knee.   Pain Ratin    Objective     See Exercise, Manual, and Modality Logs for complete treatment.     Patient Education: continued swelling, HEP    Assessment/Plan   Pt still exhibits some fluid retention around the knee but no skin discoloration. Advised pt to reach out to MD if he has questions/concerns about swelling. Progressed strengthening with good tolerance. Pt has full extension and functional flex ROM.     Progress per Plan of Care and Progress strengthening /stabilization /functional activity          Timed:         Manual Therapy:    5     mins  91620;     Therapeutic Exercise:    12     mins  95788;     Neuromuscular Leanna:    10    mins  27202;    Therapeutic Activity:     14     mins  07754;     Gait Training:           mins  97171;     Ultrasound:          mins  69624;    Ionto                                   mins   40322  Self Care                            mins   55918  Canalith Repos                   mins  70168    Un-Timed:  Electrical Stimulation:         mins  70671 ( );  Dry Needling          mins self-pay  Traction          mins 17652  Low Eval          Mins  84407  Mod Eval          Mins  11352  High Eval                            Mins  06564  Re-Eval                               mins  14114    Timed Treatment:   41   mins   Total Treatment:     41   mins          Shanthi James  Physical Therapist Assistant  [place] License # [id number]

## 2022-07-28 ENCOUNTER — TREATMENT (OUTPATIENT)
Dept: PHYSICAL THERAPY | Facility: CLINIC | Age: 67
End: 2022-07-28

## 2022-07-28 DIAGNOSIS — Z96.651 HISTORY OF TOTAL RIGHT KNEE REPLACEMENT: Primary | ICD-10-CM

## 2022-07-28 DIAGNOSIS — M25.561 ACUTE PAIN OF RIGHT KNEE: ICD-10-CM

## 2022-07-28 PROCEDURE — 97110 THERAPEUTIC EXERCISES: CPT | Performed by: PHYSICAL THERAPIST

## 2022-07-28 PROCEDURE — 97112 NEUROMUSCULAR REEDUCATION: CPT | Performed by: PHYSICAL THERAPIST

## 2022-07-28 NOTE — PROGRESS NOTES
Physical Therapy Daily Treatment Note  Patient: Nahum Restrepo   : 1955   Referring practitioner: DELROY Khan  Date of initial visit: Type: THERAPY  Noted: 2022   Today's date: 2022   Patient seen for 5 visits    Visit Diagnoses:    ICD-10-CM ICD-9-CM   1. History of total right knee replacement  Z96.651 V43.65   2. Acute pain of right knee  M25.561 719.46       Subjective   Pt reports: No new complaints. Still limited by swelling. HEP going well.      Objective   AROM 0-115 deg  See Exercise, Manual, and Modality Logs for complete treatment.     Patient Education: HEP    Assessment/Plan Significant swelling superficial to patella. Pt to use tubigrip and ice consistently until next visit. To contact MD if this does not improve.      Progress per Plan of Care            Timed:         Manual Therapy:         mins  81773;     Therapeutic Exercise:    25   mins  12846;     Neuromuscular Leanna:    15    mins  70207;    Therapeutic Activity:          mins  90641;     Gait Training:           mins  96299;     Ultrasound:          mins  27868;    Ionto                                   mins   46318  Self Care                            mins   82731    Un-Timed:  Electrical Stimulation:         mins  56482 ( );  Traction          mins 23005  Low Eval          Mins  90400  Mod Eval          Mins  36067  High Eval                            Mins  49772  Re-Eval                               mins  83779    Timed Treatment:   40   mins   Total Treatment:     40   mins      Anup Stewart, PT, DPT, OCS  [place] license: [id number]  Physical Therapist

## 2022-08-01 ENCOUNTER — TREATMENT (OUTPATIENT)
Dept: PHYSICAL THERAPY | Facility: CLINIC | Age: 67
End: 2022-08-01

## 2022-08-01 DIAGNOSIS — M25.561 ACUTE PAIN OF RIGHT KNEE: ICD-10-CM

## 2022-08-01 DIAGNOSIS — Z96.651 HISTORY OF TOTAL RIGHT KNEE REPLACEMENT: Primary | ICD-10-CM

## 2022-08-01 PROCEDURE — 97112 NEUROMUSCULAR REEDUCATION: CPT | Performed by: PHYSICAL THERAPIST

## 2022-08-01 PROCEDURE — 97110 THERAPEUTIC EXERCISES: CPT | Performed by: PHYSICAL THERAPIST

## 2022-08-01 NOTE — PROGRESS NOTES
Physical Therapy Daily Treatment Note  Patient: Nahum Restrepo   : 1955   Referring practitioner: DELROY Khan  Date of initial visit: Type: THERAPY  Noted: 2022   Today's date: 2022   Patient seen for 6 visits    Visit Diagnoses:    ICD-10-CM ICD-9-CM   1. History of total right knee replacement  Z96.651 V43.65   2. Acute pain of right knee  M25.561 719.46       Subjective   Pt reports: Pt feels his swelling has improved since last visit. HEP going well.      Objective     See Exercise, Manual, and Modality Logs for complete treatment.     Patient Education: HEP    Assessment/Plan Swelling somewhat improved vs last visit. Pt to continue to use tubigrip, ice, elevate. Pt to call MD if swelling worsens.      Progress per Plan of Care            Timed:         Manual Therapy:         mins  01870;     Therapeutic Exercise:    15     mins  84087;     Neuromuscular Leanna:    30    mins  32895;    Therapeutic Activity:          mins  76829;     Gait Training:           mins  31965;     Ultrasound:          mins  68765;    Ionto                                   mins   19891  Self Care                            mins   57181    Un-Timed:  Electrical Stimulation:         mins  38427 ( );  Traction          mins 34856  Low Eval          Mins  92467  Mod Eval          Mins  28922  High Eval                            Mins  54356  Re-Eval                               mins  93203    Timed Treatment:   45   mins   Total Treatment:     45   mins      Anup Stewart PT, DPT, OCS  [place] license: [id number]  Physical Therapist

## 2022-08-04 ENCOUNTER — TREATMENT (OUTPATIENT)
Dept: PHYSICAL THERAPY | Facility: CLINIC | Age: 67
End: 2022-08-04

## 2022-08-04 DIAGNOSIS — M25.561 ACUTE PAIN OF RIGHT KNEE: ICD-10-CM

## 2022-08-04 DIAGNOSIS — Z96.651 HISTORY OF TOTAL RIGHT KNEE REPLACEMENT: Primary | ICD-10-CM

## 2022-08-04 PROCEDURE — 97110 THERAPEUTIC EXERCISES: CPT | Performed by: PHYSICAL THERAPIST

## 2022-08-04 PROCEDURE — 97112 NEUROMUSCULAR REEDUCATION: CPT | Performed by: PHYSICAL THERAPIST

## 2022-08-04 NOTE — PROGRESS NOTES
Physical Therapy Daily Treatment Note  Patient: Nahum Restrepo   : 1955   Referring practitioner: DELROY Khan  Date of initial visit: Type: THERAPY  Noted: 2022   Today's date: 2022   Patient seen for 7 visits    Visit Diagnoses:    ICD-10-CM ICD-9-CM   1. History of total right knee replacement  Z96.651 V43.65   2. Acute pain of right knee  M25.561 719.46       Subjective   Pt reports: Swelling and strength improving. HEP going well.      Objective     See Exercise, Manual, and Modality Logs for complete treatment.     Patient Education: HEP    Assessment/Plan Swelling grossly improved vs last visit.      Progress per Plan of Care            Timed:         Manual Therapy:         mins  37287;     Therapeutic Exercise:    15     mins  34289;     Neuromuscular Leanna:    25    mins  89489;    Therapeutic Activity:          mins  94352;     Gait Training:           mins  79285;     Ultrasound:          mins  07233;    Ionto                                   mins   62403  Self Care                            mins   96113    Un-Timed:  Electrical Stimulation:         mins  47938 ( );  Traction          mins 14263  Low Eval          Mins  10239  Mod Eval          Mins  00630  High Eval                            Mins  87015  Re-Eval                               mins  42735    Timed Treatment:   40   mins   Total Treatment:     40   mins      Anup Stewart, PT, DPT, OCS  [place] license: [id number]  Physical Therapist

## 2022-08-08 ENCOUNTER — TREATMENT (OUTPATIENT)
Dept: PHYSICAL THERAPY | Facility: CLINIC | Age: 67
End: 2022-08-08

## 2022-08-08 DIAGNOSIS — Z96.651 HISTORY OF TOTAL RIGHT KNEE REPLACEMENT: Primary | ICD-10-CM

## 2022-08-08 DIAGNOSIS — M25.561 ACUTE PAIN OF RIGHT KNEE: ICD-10-CM

## 2022-08-08 PROCEDURE — 97112 NEUROMUSCULAR REEDUCATION: CPT | Performed by: PHYSICAL THERAPIST

## 2022-08-08 PROCEDURE — 97110 THERAPEUTIC EXERCISES: CPT | Performed by: PHYSICAL THERAPIST

## 2022-08-08 NOTE — PROGRESS NOTES
Physical Therapy Daily Treatment Note  Patient: Nahum Restrepo   : 1955   Referring practitioner: DELROY Khan  Date of initial visit: Type: THERAPY  Noted: 2022   Today's date: 2022   Patient seen for 8 visits    Visit Diagnoses:    ICD-10-CM ICD-9-CM   1. History of total right knee replacement  Z96.651 V43.65   2. Acute pain of right knee  M25.561 719.46       Subjective   Pt reports: Swelling improving. HEP going well.      Objective     See Exercise, Manual, and Modality Logs for complete treatment.     Patient Education: HEP    Assessment/Plan LE fatigued post visit. Swelling improving.      Progress per Plan of Care            Timed:         Manual Therapy:         mins  32962;     Therapeutic Exercise:    15     mins  07453;     Neuromuscular Leanna:    25    mins  37428;    Therapeutic Activity:          mins  17375;     Gait Training:           mins  68314;     Ultrasound:          mins  23004;    Ionto                                   mins   45464  Self Care                            mins   72313    Un-Timed:  Electrical Stimulation:         mins  19749 ( );  Traction          mins 07294  Low Eval          Mins  39259  Mod Eval          Mins  49209  High Eval                            Mins  95904  Re-Eval                               mins  24251    Timed Treatment:   40   mins   Total Treatment:     40   mins      Anup Stewart, PT, DPT, OCS  [place] license: [id number]  Physical Therapist

## 2022-08-11 ENCOUNTER — TREATMENT (OUTPATIENT)
Dept: PHYSICAL THERAPY | Facility: CLINIC | Age: 67
End: 2022-08-11

## 2022-08-11 DIAGNOSIS — M25.561 ACUTE PAIN OF RIGHT KNEE: ICD-10-CM

## 2022-08-11 DIAGNOSIS — Z96.651 HISTORY OF TOTAL RIGHT KNEE REPLACEMENT: Primary | ICD-10-CM

## 2022-08-11 PROCEDURE — 97110 THERAPEUTIC EXERCISES: CPT | Performed by: PHYSICAL THERAPIST

## 2022-08-11 PROCEDURE — 97112 NEUROMUSCULAR REEDUCATION: CPT | Performed by: PHYSICAL THERAPIST

## 2022-08-11 NOTE — PROGRESS NOTES
Physical Therapy Daily Treatment Note  Patient: Nahum Restrepo   : 1955   Referring practitioner: DELROY Khan  Date of initial visit: Type: THERAPY  Noted: 2022   Today's date: 2022   Patient seen for 9 visits    Visit Diagnoses:    ICD-10-CM ICD-9-CM   1. History of total right knee replacement  Z96.651 V43.65   2. Acute pain of right knee  M25.561 719.46       Subjective   Pt reports: Pain and stiffness improving. HEP going well.      Objective     See Exercise, Manual, and Modality Logs for complete treatment.     Patient Education: HEP    Assessment/Plan Progressing well. Reassess next visit.      Progress per Plan of Care            Timed:         Manual Therapy:         mins  80632;     Therapeutic Exercise:    30     mins  83538;     Neuromuscular Leanna:    15    mins  27111;    Therapeutic Activity:        mins  06806;     Gait Training:           mins  58276;     Ultrasound:          mins  69151;    Ionto                                   mins   18135  Self Care                            mins   64020    Un-Timed:  Electrical Stimulation:         mins  24012 ( );  Traction          mins 98889  Low Eval          Mins  39883  Mod Eval          Mins  99696  High Eval                            Mins  42088  Re-Eval                               mins  91098    Timed Treatment:   45   mins   Total Treatment:     45   mins      Anup Stewart, PT, DPT, OCS  [place] license: [id number]  Physical Therapist

## 2022-08-16 ENCOUNTER — TREATMENT (OUTPATIENT)
Dept: PHYSICAL THERAPY | Facility: CLINIC | Age: 67
End: 2022-08-16

## 2022-08-16 DIAGNOSIS — M25.561 ACUTE PAIN OF RIGHT KNEE: ICD-10-CM

## 2022-08-16 DIAGNOSIS — Z96.651 HISTORY OF TOTAL RIGHT KNEE REPLACEMENT: Primary | ICD-10-CM

## 2022-08-16 PROCEDURE — 97112 NEUROMUSCULAR REEDUCATION: CPT | Performed by: PHYSICAL THERAPIST

## 2022-08-16 PROCEDURE — 97110 THERAPEUTIC EXERCISES: CPT | Performed by: PHYSICAL THERAPIST

## 2022-08-16 NOTE — PROGRESS NOTES
Re-Assessment / Re-Certification        Patient: Nahum Restrepo   : 1955  Diagnosis/ICD-10 Code:  History of total right knee replacement [Z96.651]  Referring practitioner: DELROY Khan  Date of Initial Visit: Type: THERAPY  Noted: 2022  Today's Date: 2022  Patient seen for 10 sessions      Subjective:     Subjective Questionnaire: Knee outcomes score 53%  Clinical Progress: improved  Home Program Compliance: Yes  Treatment has included: therapeutic exercise, neuromuscular re-education, manual therapy and gait training    Subjective Pt reports general improvement vs IE date, worst pain is 7/10. However, pt c/o and shows significant swelling superficial to the patella.   Objective   AROM 0-117 deg  Knee ext and flexion strength 4+/5  Significant swelling superficial to the patella, slightly warm to the touch  Assessment/Plan   Pt has made good progress in most areas. However, his progress has been limited by superficial and significant swelling. Swelling improved with consistent icing, elevation, and compression; however, it again worsened over the weekend and into today.    Short term goals, 1 week: Tolerate HEP progression. met  Voice compliance with activity modification. met  Report improvement in symptoms. met    LTGs, 6 weeks: Improve knee outcomes score to 85%. progressing  AROM to be 0-115 deg. met  5/5 LE strength throughout. progressing  Ambulate 20 min without assistive device and with no to minimal gait impairment. Partially met  Independent with HEP. Progressing  Show minimal swelling. Not met    Continue BIW for 4 weeks pending MD/PA follow up.    Anup Stewart, PT, DPT, OCS  [place] license: [id number]  Physical Therapist      Based upon review of the patient's progress and continued therapy plan, it is my medical opinion that Nahum Restrepo should continue physical therapy treatment at [facility]  [address] [place]  [id number] [phone].    Provider: _____________________________    Cindy Boo PA     Timed:         Manual Therapy:         mins  14059;     Therapeutic Exercise:    30     mins  71210;     Neuromuscular Leanna:    8    mins  96053;    Therapeutic Activity:          mins  06954;     Gait Training:           mins  12248;     Ultrasound:          mins  92639;    Ionto                                   mins   31870  Self Care                            mins   05804    Un-Timed:  Electrical Stimulation:         mins  50042 ( );  Traction          mins 26273  Low Eval          Mins  03644  Mod Eval          Mins  29359  High Eval                            Mins  20394  Re-Eval                               mins  52507      Timed Treatment:   38   mins   Total Treatment:     38   mins

## 2022-08-18 ENCOUNTER — OFFICE VISIT (OUTPATIENT)
Dept: ORTHOPEDIC SURGERY | Facility: CLINIC | Age: 67
End: 2022-08-18

## 2022-08-18 VITALS
WEIGHT: 204 LBS | OXYGEN SATURATION: 97 % | HEIGHT: 72 IN | HEART RATE: 92 BPM | SYSTOLIC BLOOD PRESSURE: 111 MMHG | BODY MASS INDEX: 27.63 KG/M2 | DIASTOLIC BLOOD PRESSURE: 77 MMHG

## 2022-08-18 DIAGNOSIS — E66.3 OVERWEIGHT (BMI 25.0-29.9): ICD-10-CM

## 2022-08-18 DIAGNOSIS — M96.840 POSTOPERATIVE HEMATOMA OF MUSCULOSKELETAL STRUCTURE FOLLOWING MUSCULOSKELETAL PROCEDURE: ICD-10-CM

## 2022-08-18 DIAGNOSIS — Z96.651 HX OF TOTAL KNEE REPLACEMENT, RIGHT: Primary | ICD-10-CM

## 2022-08-18 LAB
APPEARANCE FLD: ABNORMAL
COLOR FLD: ABNORMAL
EOSINOPHIL NFR FLD MANUAL: 6 %
LYMPHOCYTES NFR FLD MANUAL: 28 %
METHOD: ABNORMAL
MONOCYTES NFR FLD: 1 %
NEUTROPHILS NFR FLD MANUAL: 65 %
NUC CELL # FLD: 128 /MM3

## 2022-08-18 PROCEDURE — 87015 SPECIMEN INFECT AGNT CONCNTJ: CPT | Performed by: PHYSICIAN ASSISTANT

## 2022-08-18 PROCEDURE — 87075 CULTR BACTERIA EXCEPT BLOOD: CPT | Performed by: PHYSICIAN ASSISTANT

## 2022-08-18 PROCEDURE — 99024 POSTOP FOLLOW-UP VISIT: CPT | Performed by: PHYSICIAN ASSISTANT

## 2022-08-18 PROCEDURE — 87076 CULTURE ANAEROBE IDENT EACH: CPT | Performed by: PHYSICIAN ASSISTANT

## 2022-08-18 PROCEDURE — 89051 BODY FLUID CELL COUNT: CPT | Performed by: PHYSICIAN ASSISTANT

## 2022-08-18 PROCEDURE — 87205 SMEAR GRAM STAIN: CPT | Performed by: PHYSICIAN ASSISTANT

## 2022-08-18 PROCEDURE — 87070 CULTURE OTHR SPECIMN AEROBIC: CPT | Performed by: PHYSICIAN ASSISTANT

## 2022-08-18 RX ORDER — LIDOCAINE HYDROCHLORIDE 10 MG/ML
3 INJECTION, SOLUTION EPIDURAL; INFILTRATION; INTRACAUDAL; PERINEURAL
Status: COMPLETED | OUTPATIENT
Start: 2022-08-18 | End: 2022-08-18

## 2022-08-18 RX ADMIN — LIDOCAINE HYDROCHLORIDE 3 ML: 10 INJECTION, SOLUTION EPIDURAL; INFILTRATION; INTRACAUDAL; PERINEURAL at 11:06

## 2022-08-18 NOTE — PROGRESS NOTES
"    ORTHO POSTOP VISIT       Subjective:    HPI:  Nahum Restrepo is a 66 y.o. male who presents about 8 weeks out from having a right total knee replacement.  He reports that he is doing well with mild intermittent discomfort, mainly along the posterior aspect of the knee.  He reports a few weeks ago he did notice that some fluid developed superficially on the knee and he also had a scab on his incision that he thought looked like a suture.  He picked off the scab and quite a bit of fluid drained from the knee.  He also reports that a significant amount of fluid stayed on the knee but he was not significantly bothered by that.  He denies any significant pain or systemic symptoms of infection.    Medications:    Current Outpatient Medications:   •  alfuzosin (UROXATRAL) 10 MG 24 hr tablet, Take 10 mg by mouth Daily., Disp: , Rfl:   •  ALPRAZolam (XANAX) 0.5 MG tablet, TAKE 1 TABLET BY MOUTH TWICE DAILY AS NEEDED, Disp: 60 tablet, Rfl: 3  •  amLODIPine (NORVASC) 5 MG tablet, Take 1 tablet by mouth Daily., Disp: 90 tablet, Rfl: 3  •  metoprolol succinate XL (TOPROL-XL) 50 MG 24 hr tablet, Take 1 tablet by mouth Daily., Disp: 90 tablet, Rfl: 3  •  multivitamin with minerals tablet tablet, Take 1 tablet by mouth Daily., Disp: , Rfl:   •  diclofenac (VOLTAREN) 75 MG EC tablet, TAKE 1 TABLET BY MOUTH TWICE DAILY AS NEEDED FOR JOINT PAIN, Disp: 60 tablet, Rfl: 11  •  valsartan-hydrochlorothiazide (DIOVAN-HCT) 320-25 MG per tablet, Take 1 tablet by mouth Daily for 90 days., Disp: 90 tablet, Rfl: 3  Current outpatient and discharge medications have been reconciled for the patient.  Reviewed by: DELROY Khan      Allergies:  No Known Allergies       Objective   Objective:    /77 (BP Location: Left arm, Patient Position: Sitting, Cuff Size: Adult)   Pulse 92   Ht 182.9 cm (72\")   Wt 92.5 kg (204 lb)   SpO2 97%   BMI 27.67 kg/m²     Physical Examination:  Alert, oriented, overweight individual in no acute " distress, ambulating unassisted  Right lower extremity shows a well-healed surgical incision with no erythema, drainage, or open skin lesions. There is a moderate amount of swelling with superficial fluid fluctuation. It is grossly well aligned, and the patient is neurovascularly intact distally. The knee is stable to varus and valgus stress, there is no patellar maltracking or crepitus noted, and plantar and dorsiflexion is 5/5. There is  no tenderness to palpation or with range of motion, which is about 0-115.         Imaging:  no diagnostic testing performed this visit            Assessment:  1. Hx of total knee replacement, right    2. Postoperative hematoma of musculoskeletal structure following musculoskeletal procedure    3. Overweight (BMI 25.0-29.9)               Plan:  The patient would like to have the fluid aspirated at this time, as it has not resolved on its own.  Risks and benefits discussed and post instructions given.  The fluid will be sent for culture as a precaution, as the patient does have a total knee.  His leg will be wrapped with Ace wraps for compression, which I have instructed him to leave in place for at least 3 days, he may remove for showering.  He should follow-up with Dr. Felix in 2 weeks for recheck.  - Large Joint Arthrocentesis: R knee on 8/18/2022 11:06 AM  Indications: pain and joint swelling  Details: 18 G needle, lateral approach  Medications: 3 mL lidocaine PF 1% 1 %  Aspirate: 95 mL bloody; sent for lab analysis  Outcome: tolerated well, no immediate complications  Procedure, treatment alternatives, risks and benefits explained, specific risks discussed. Consent was given by the patient. Immediately prior to procedure a time out was called to verify the correct patient, procedure, equipment, support staff and site/side marked as required. Patient was prepped and draped in the usual sterile fashion.       After consent was obtained and a time out was properly performed, the  "right knee was prepped with alcohol and chlorhexidine. A 25 gauge needle was used to inject 3 cc of 1% lidocaine, following this an 18-gauge needle was used to aspirate approximately 95 cc of bloody fluid superficially from the knee. Sterile technique was used and the patient tolerated the procedure well.            DELROY Khan  08/18/22  11:02 EDT    \"Please note that portions of this note were completed with a voice recognition program\".                   "

## 2022-08-23 LAB
BACTERIA FLD CULT: NORMAL
GRAM STN SPEC: NORMAL
GRAM STN SPEC: NORMAL

## 2022-08-24 ENCOUNTER — OFFICE VISIT (OUTPATIENT)
Dept: ORTHOPEDIC SURGERY | Facility: CLINIC | Age: 67
End: 2022-08-24

## 2022-08-24 DIAGNOSIS — Z96.651 S/P TKR (TOTAL KNEE REPLACEMENT), RIGHT: Primary | ICD-10-CM

## 2022-08-24 LAB — BACTERIA SPEC ANAEROBE CULT: ABNORMAL

## 2022-08-24 PROCEDURE — 99024 POSTOP FOLLOW-UP VISIT: CPT | Performed by: ORTHOPAEDIC SURGERY

## 2022-08-24 NOTE — PROGRESS NOTES
POST OP TOTAL GLOBAL      NAME: Nahum Restrepo           : 1955    MRN: [id number]    Chief Complaint   Patient presents with   • Right Knee - Post-op       Date of Surgery: 22  ?  HPI:   Patient returns today for 9 week follow up of right total knee arthroplasty Incision(s) healed nicely with no signs of infection. Patient reports doing well with no unusual complaints. No fevers, rigors or chills. Appears to be progressing appropriately. Patient is on appropriate anticoagulation.  The patient has done extremely well with physical therapy.  His range of motion improved significantly and he is able to flex to 125 degrees without any issue.  He has developed a prepatellar effusion.  I am not sure what the etiology of this is.  He states he is a little bit better than last week when his knee was aspirated by Monica Boo.  He for started to notice the prepatellar swelling on 2022.  His aspirate from the prepatellar fluid is essentially was negative for any microorganisms.      Ortho Exam:   Right knee.The patient is status post total knee arthroplasty postoperative 9 week(s). Incision is clean. Calf is soft and nontender. Homans sign is negative. There is no clicking, popping or catching. Anterior and posterior drawer signs are negative.  There is no instability of the components. Appropriate amounts of swelling and bruising are noted. Dorsalis pedis and posterior tibial artery pulses are palpable. Common peroneal nerve function is well preserved. Range of motion is from 0-125 degrees of flexion. Gait is cautious but otherwise fairly normal. There is no evidence of a deep seated joint infection.      Diagnostic Studies:  RIGHT knee xrays  weightbearing/standing ap/lateral views were performed at [facility] on 2022. Images were independently viewed and interpreted by myself, my impression as follows:    right Knee X-Ray  Indication: Evaluation of implant position after total  knee arthroplasty.  AP, Lateral views  Findings: Excellent position of the implants with a good cement mantle without any subsidence of the implants.  no bony lesion  Soft tissues within normal limits  within normal limits joint spaces  Hardware appropriately positioned yes      yes prior studies available for comparison.    This patient's x-ray report was graded according to the Kellgren and Momo classification.  This took into account the joint space narrowing, osteophyte formation, sclerosis of the distal femur/proximal tibia along with deformity of those bones.  The findings were indicative of K L grade not applicable.    X-RAY was ordered and reviewed by Km Felix MD    Laboratory work:    Anaerobic cultures of the body.  From the previous aspect of the arm noted.  The fluid shows no evidence of any microorganisms.  There are 65% neutrophils and 28% lymphocytes.  No evidence of pus or necrotic tissue is noted on the aspirate.  Assessment:  Diagnoses and all orders for this visit:    1. S/P TKR (total knee replacement), right (Primary)  -     XR Knee 3 View Right            Plan   · Incision care  · To use ACE wrap/BHAVIN stocking  · Continue ice to joint   · Stretching and strengthening exercises of the quads and the hamstrings.  · Extensive discussion with the patient about different treatment options.  · I do not think he requires antibiotics at this point.  · Local application of an Ace wrap and ice discussed with the patient.  · If his symptoms do not resolved with mechanical measures of control such as Ace wraps and ice then I will be happy to take him back to surgery and perform drainage of the prepatellar bursa followed by possibly local soft tissue therapy to help reduce the effusion in this location.  · There is no evidence of deep-seated sepsis related to the total knee arthroplasty and therefore no indication for revision surgery and antibiotic spacer implant is indicated.  · Aggressive  ROM  · Falls precautions  · You may now resume any prescription medications you were taking prior to surgery  · Continue with lifelong antibiotic prophylaxis with dental procedures following total joint replacement.  · Follow up in 4 week(s)    Date of encounter: 08/24/2022   Km Felix MD

## 2022-08-25 RX ORDER — AMOXICILLIN AND CLAVULANATE POTASSIUM 875; 125 MG/1; MG/1
1 TABLET, FILM COATED ORAL 2 TIMES DAILY
Qty: 28 TABLET | Refills: 0 | Status: SHIPPED | OUTPATIENT
Start: 2022-08-25

## 2022-08-25 NOTE — TELEPHONE ENCOUNTER
PATIENT CALLED ASKING TO SPEAK WITH [facility].  HE IS WANTING TO KNOW WHEN IT WAS DETERMINED THAT HE HAS AN INFECTION.    I EXPLAINED THAT DR PERALTA HAS PRESCRIBED THE ANTIBIOTIC AS A PROPHYLAXIS (PREVENTATIVE).      PLEASE CALL PATIENT TO CLARIFY FOR HIM

## 2022-08-25 NOTE — TELEPHONE ENCOUNTER
PATIENT RETURNED [facility]S CALL AND I READ DR. PERALTA'S MESSAGE TO HIM.  HE EXPRESSED UNDERSTANDING REGARDING ALL.

## 2022-08-25 NOTE — TELEPHONE ENCOUNTER
Left message for the patient to return my call regarding Dr. Felix's response    Medication pended

## 2022-08-25 NOTE — TELEPHONE ENCOUNTER
Patient was in the office yesterday and expressed dissatisfaction with his visit last week on 08/18/22, but was thankful Dr. Felix was able to see him yesterday. He would like future appointments to be with Dr. Felix.

## 2022-08-25 NOTE — TELEPHONE ENCOUNTER
Patient has been informed he does not have an infection; Dr. Felix took the extra step to speak with infectious disease and it was decided to go with this course of treatment to keep him from getting an infection    Patient voiced understanding.

## 2022-08-25 NOTE — TELEPHONE ENCOUNTER
Okay.  Noted.  I will be glad to see him on all future visits.  Please call the patient and let him know that I spoke to an infectious disease specialist yesterday.  I would like him to go onto tablet Augmentin 875 mg tab 1 p.o. twice daily for 2 weeks.  This is for prophylaxis of infection around that joint where he had recent surgery.  If he is allergic to Augmentin we can place him on doxycycline 100 mg tab 1 p.o. twice daily.  Please call and let them know about this conversation with infectious disease specialist.  There is nothing to worry about just being safe and cautious.  Thank you

## 2022-09-14 ENCOUNTER — OFFICE VISIT (OUTPATIENT)
Dept: ORTHOPEDIC SURGERY | Facility: CLINIC | Age: 67
End: 2022-09-14

## 2022-09-14 DIAGNOSIS — Z96.651 S/P TKR (TOTAL KNEE REPLACEMENT), RIGHT: Primary | ICD-10-CM

## 2022-09-14 PROCEDURE — 99024 POSTOP FOLLOW-UP VISIT: CPT | Performed by: ORTHOPAEDIC SURGERY

## 2022-09-14 NOTE — PROGRESS NOTES
POST OP TOTAL GLOBAL      NAME: Nahum Restrepo           : 1955    MRN: [id number]    Chief Complaint   Patient presents with   • Right Knee - Post-op       Date of Surgery: 2022  ?  HPI:   Patient returns today for 12 week follow up of right total knee arthroplasty Incision(s) healed nicely with no signs of infection. Patient reports doing well with no unusual complaints. No fevers, rigors or chills. Appears to be progressing appropriately. Patient is on appropriate anticoagulation.  He is doing a whole lot better at this point.  Range of motion has progressively improved with physical therapy.  He still has a slight amount of numbness on the lateral aspect of the incision.  I have explained to him that it does take some time to resolve but he should slowly and progressively get better from that symptom.      Ortho Exam:   Right knee.The patient is status post total knee arthroplasty postoperative 12 week(s). Incision is clean. Calf is soft and nontender. Homans sign is negative. There is no clicking, popping or catching. Anterior and posterior drawer signs are negative.  There is no instability of the components. Appropriate amounts of swelling and bruising are noted. Dorsalis pedis and posterior tibial artery pulses are palpable. Common peroneal nerve function is well preserved. Range of motion is from 0-125 degrees of flexion. Gait is cautious but otherwise fairly normal. There is no evidence of a deep seated joint infection.      Diagnostic Studies:  no diagnostic testing performed this visit        Assessment:  Diagnoses and all orders for this visit:    1. S/P TKR (total knee replacement), right (Primary)            Plan   · Incision care  · To use ACE wrap/BHAVIN stocking  · Continue ice to joint   · Stretching and strengthening exercises  · Aggressive ROM  · Falls precautions  · You may now resume any prescription medications you were taking prior to surgery  · Continue with lifelong antibiotic  prophylaxis with dental procedures following total joint replacement.  · Follow up in 6 month(s)    Date of encounter: 09/14/2022   Km Felix MD

## 2022-10-24 RX ORDER — VALSARTAN AND HYDROCHLOROTHIAZIDE 320; 25 MG/1; MG/1
TABLET, FILM COATED ORAL
Qty: 90 TABLET | Refills: 1 | Status: SHIPPED | OUTPATIENT
Start: 2022-10-24

## 2022-10-27 RX ORDER — METOPROLOL SUCCINATE 50 MG/1
50 TABLET, EXTENDED RELEASE ORAL DAILY
Qty: 90 TABLET | Refills: 3 | Status: SHIPPED | OUTPATIENT
Start: 2022-10-27

## 2022-11-30 ENCOUNTER — TELEPHONE (OUTPATIENT)
Dept: FAMILY MEDICINE CLINIC | Facility: CLINIC | Age: 67
End: 2022-11-30

## 2022-11-30 NOTE — TELEPHONE ENCOUNTER
Spoke with patient and scheduled a [facility] lab appt on 5/8/2023 at 8:40am.

## 2022-11-30 NOTE — TELEPHONE ENCOUNTER
Caller: Nahum Restrepo    Relationship: Self    Best call back number: [phone]    What orders are you requesting (i.e. lab or imaging): LABS     In what timeframe would the patient need to come in: BY 5/15/23    Where will you receive your lab/imaging services: IN THE OFFICE    Additional notes: PLEASE CONTACT PATIENT BACK TO SCHEDULE HIS APPOINTMENT 1 WEEK PRIOR TO HIS ANNUAL VISIT ON 5/15/23.

## 2023-02-06 RX ORDER — AMLODIPINE BESYLATE 5 MG/1
5 TABLET ORAL DAILY
Qty: 90 TABLET | Refills: 1 | Status: SHIPPED | OUTPATIENT
Start: 2023-02-06

## 2023-03-09 DIAGNOSIS — F41.9 ANXIETY: ICD-10-CM

## 2023-03-10 RX ORDER — ALPRAZOLAM 0.5 MG/1
TABLET ORAL
Qty: 60 TABLET | Refills: 1 | Status: SHIPPED | OUTPATIENT
Start: 2023-03-10

## 2023-04-05 ENCOUNTER — OFFICE VISIT (OUTPATIENT)
Dept: ORTHOPEDIC SURGERY | Facility: CLINIC | Age: 68
End: 2023-04-05
Payer: MEDICARE

## 2023-04-05 VITALS — BODY MASS INDEX: 27.63 KG/M2 | HEIGHT: 72 IN | WEIGHT: 204 LBS

## 2023-04-05 DIAGNOSIS — M25.551 RIGHT HIP PAIN: Primary | ICD-10-CM

## 2023-04-05 RX ORDER — MELOXICAM 7.5 MG/1
7.5 TABLET ORAL DAILY
Qty: 30 TABLET | Refills: 2 | Status: SHIPPED | OUTPATIENT
Start: 2023-04-05

## 2023-04-05 NOTE — PROGRESS NOTES
"Chief Complaint  Follow-up of the Right Knee    Subjective    History of Present Illness      Nahum Restrepo is a 67 y.o. male who presents to [facility] for follow-up on total knee arthroplasty.  The patient has done extremely well from his surgery that was performed by me on June 21, 2022.  He states \"my new knee is great \".  The patient is pleased with his outcome and improved his range of motion tremendously with physical therapy.  He is not taking any pain medication for his pain and symptoms of the knee.  At this point he has right hip pain and discomfort.  The pain is on the lateral aspect of the hip.  The pain is centered over the greater trochanteric bursa.  There are no risk factors for avascular necrosis.  He does not have any issues with trauma to the hip.  History of Present Illness   Pain Location:  RIGHT knee and right hip.  Radiation: none  Quality: dull, aching  Intensity/Severity: mild-moderate  Duration: 1 month to the right hip  Progression of symptoms: yes, progressive worsening  Onset quality: gradual   Timing: intermittent  Aggravating Factors: kneeling, squatting  Alleviating Factors: NSAIDs  Previous Episodes: yes  Associated Symptoms: pain, swelling  ADLs Affected: ambulating, recreational activities/sports  Previous Treatment: NSAIDs       Objective   Vital Signs:   Ht 182.9 cm (72\")   Wt 92.5 kg (204 lb)   BMI 27.67 kg/m²     Physical Exam  Physical Exam  Vitals signs and nursing note reviewed.   Constitutional:       Appearance: Normal appearance.   Pulmonary:      Effort: Pulmonary effort is normal.   Skin:     General: Skin is warm and dry.      Capillary Refill: Capillary refill takes less than 2 seconds.   Neurological:      General: No focal deficit present.      Mental Status: He is alert and oriented to person, place, and time. Mental status is at baseline.   Psychiatric:         Mood and Affect: Mood normal.         Behavior: Behavior normal.    "      Thought Content: Thought content normal.         Judgment: Judgment normal.     Ortho Exam   Right hip (gtb): Skin and soft tissues over the greater trochanteric bursa are tender upon palpation, along with IT band tenderness. Cross body adduction is negative. Internal and external rotations are bothersome and cause tenderness over the greater trochanter. There is no clinical deformity and no shortening. He does not have a limp upon walking. There is not piriformis tightness and there  is not capsular tightness with any rotation. Dorsalis pedis and posterior tibial artery pulses are palpable. Neurovascular status is intact and capillary refill is less than 2 seconds. Common peroneal nerve function is well preserved.    Right knee.The patient is status post total knee arthroplasty postoperative 9.5 month(s). Incision is clean. Calf is soft and nontender. Homans sign is negative. There is no clicking, popping or catching. Anterior and posterior drawer signs are negative.  There is no instability of the components. Appropriate amounts of swelling and bruising are noted. Dorsalis pedis and posterior tibial artery pulses are palpable. Common peroneal nerve function is well preserved. Range of motion is from 0-125 degrees of flexion. Gait is cautious but otherwise fairly normal. There is no evidence of a deep seated joint infection.        Result Review :   The following data was reviewed by: Km Felix MD on 04/05/2023:  Radiologic studies - see below for interpretation  RIGHT hip with pelvis xrays  weightbearing/standing ap/lateral views were performed at [facility] on 04/05/2023. Images were independently viewed and interpreted by myself, my impression as follows:      right Hip X-Ray  Indication: Evaluation of pain and discomfort on the lateral aspect of the hip.  AP and lateral  Findings: Early degenerative changes noted with osteophyte formation over the lateral aspect of the greater trochanter.  no bony  lesion  Soft tissues within normal limits  within normal limits joint spaces  Hardware appropriately positioned not applicable      no prior studies available for comparison.    X-RAY was ordered and reviewed by Km Felix MD          Procedures           Assessment   Assessment and Plan    Diagnoses and all orders for this visit:    1. Right hip pain (Primary)  -     XR Hip With or Without Pelvis 2 - 3 View Right    Other orders  -     meloxicam (Mobic) 7.5 MG tablet; Take 1 tablet by mouth Daily.  Dispense: 30 tablet; Refill: 2          Follow Up   · Compression/brace to the knee if he is walking on uneven surfaces.    · Steroid injection to the base of the greater trochanter discussed and offered to the patient.  · Calcium and vitamin D for bone health.  · , GI and dental procedure prophylaxis with antibiotics to prevent metastatic infection of the knee arthroplasty implants.  · Rest, ice, compression, and elevation (RICE) therapy  · Stretching and strengthening exercises of the quads and the hamstrings.  · OTC Alternate Ibuprofen and Tylenol as needed  · Follow up in 3 month(s)  • Patient was given instructions and counseling regarding his condition or for health maintenance advice. Please see specific information pulled into the AVS if appropriate.     Km Felix MD   Date of Encounter: 4/5/2023   Electronically signed by Km Felix MD, 04/05/23, 9:08 AM EDT.     EMR Dragon/Transcription disclaimer:  Much of this encounter note is an electronic transcription/translation of spoken language to printed text. The electronic translation of spoken language may permit erroneous, or at times, nonsensical words or phrases to be inadvertently transcribed; Although I have reviewed the note for such errors, some may still exist.

## 2023-04-17 ENCOUNTER — TELEPHONE (OUTPATIENT)
Dept: ORTHOPEDIC SURGERY | Facility: CLINIC | Age: 68
End: 2023-04-17

## 2023-04-17 NOTE — TELEPHONE ENCOUNTER
"    Caller: Nahum Restrepo \"CLAIR\"    Relationship to patient: Self    Best call back number: [phone]    Chief complaint: RT HIP    Type of visit: SHIRA  INJECTION    Requested date: ASAP    "

## 2023-04-26 ENCOUNTER — OFFICE VISIT (OUTPATIENT)
Dept: ORTHOPEDIC SURGERY | Facility: CLINIC | Age: 68
End: 2023-04-26
Payer: MEDICARE

## 2023-04-26 DIAGNOSIS — M70.61 TROCHANTERIC BURSITIS OF RIGHT HIP: ICD-10-CM

## 2023-04-26 DIAGNOSIS — Z96.651 S/P TKR (TOTAL KNEE REPLACEMENT), RIGHT: Primary | ICD-10-CM

## 2023-04-26 RX ORDER — TRIAMCINOLONE ACETONIDE 40 MG/ML
80 INJECTION, SUSPENSION INTRA-ARTICULAR; INTRAMUSCULAR
Status: COMPLETED | OUTPATIENT
Start: 2023-04-26 | End: 2023-04-26

## 2023-04-26 RX ORDER — LIDOCAINE HYDROCHLORIDE 10 MG/ML
2 INJECTION, SOLUTION INFILTRATION; PERINEURAL
Status: COMPLETED | OUTPATIENT
Start: 2023-04-26 | End: 2023-04-26

## 2023-04-26 RX ADMIN — LIDOCAINE HYDROCHLORIDE 2 ML: 10 INJECTION, SOLUTION INFILTRATION; PERINEURAL at 10:02

## 2023-04-26 RX ADMIN — TRIAMCINOLONE ACETONIDE 80 MG: 40 INJECTION, SUSPENSION INTRA-ARTICULAR; INTRAMUSCULAR at 10:02

## 2023-04-26 NOTE — PROGRESS NOTES
"Orthopedic follow-up visit.    Patient: Nahum Restrepo    YOB: 1955    MRN: [id number]    Chief Complaint   Patient presents with   • Right Hip - Follow-up       History of Present Illness: Patient returns for follow-up on hip pain. The pain is over the lateral aspect of the right hip at the greater trochanteric bursa.  His pain has been progressive in nature and remains intermittent .   His pain is worsened  kneeling, rising after sitting, squatting. There has been improvement in the past with heat, NSAIDS and injections.  The patient states that the hip is bothering him mostly on the lateral aspect of the hip.  There is some difficulty in turning over in bed at night.  The patient does have some pain with cross body activities as well.  He is also following up on his total knee arthroplasty 10 months out.  He has a right knee replacement performed by me on 21 June 2022.  He has made excellent progress with his knee replacement.  There is no limitation of range of motion.  Physical therapy was very helpful in managing his symptoms and improving the range of motion.  He is functionally very pleased with his knee replacement and states \"my new knee is absolutely great \".  The patient also states that he is not taking any narcotic medication for the knee symptoms.  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     Allergies: No Known Allergies    Medications:   Home Medications:  Current Outpatient Medications on File Prior to Visit   Medication Sig   • alfuzosin (UROXATRAL) 10 MG 24 hr tablet Take 10 mg by mouth Daily.   • ALPRAZolam (XANAX) 0.5 MG tablet TAKE 1 TABLET BY MOUTH TWICE DAILY AS NEEDED   • amLODIPine (NORVASC) 5 MG tablet TAKE 1 TABLET BY MOUTH DAILY   • meloxicam (Mobic) 7.5 MG tablet Take 1 tablet by mouth Daily.   • methocarbamol (ROBAXIN) 500 MG tablet Take 1 tablet by mouth 4 (Four) Times a Day.   • metoprolol succinate XL (TOPROL-XL) 50 MG 24 hr tablet TAKE 1 TABLET BY MOUTH DAILY   • " multivitamin with minerals tablet tablet Take 1 tablet by mouth Daily.   • valsartan-hydrochlorothiazide (DIOVAN-HCT) 320-25 MG per tablet TAKE 1 TABLET BY MOUTH DAILY     No current facility-administered medications on file prior to visit.     Current Medications:  Scheduled Meds:  PRN Meds:.    I have reviewed the patient's medical history in detail and updated the computerized patient record.  Review and summarization of old records include:    Past Medical History:   Diagnosis Date   • Hypertension    • Mild anxiety    • Primary osteoarthritis of right knee 05/18/2021   • S/P right knee arthroscopy 07/07/2021     Past Surgical History:   Procedure Laterality Date   • KNEE ARTHROSCOPY Right 06/16/2021    Procedure: KNEE ARTHROSCOPY with chondroplasty with medial meniscectomy;  Surgeon: Km Felix MD;  Location: [facility];  Service: Orthopedics;  Laterality: Right;   • REPLACEMENT TOTAL KNEE Right 2022   • SHOULDER SURGERY Left    • TOTAL KNEE ARTHROPLASTY Right 06/21/2022    Procedure: TOTAL KNEE ARTHROPLASTY WITH MINGO ROBOT;  Surgeon: Km Felix MD;  Location: [facility];  Service: Robotics - Ortho;  Laterality: Right;     Social History     Occupational History   • Not on file   Tobacco Use   • Smoking status: Never     Passive exposure: Never   • Smokeless tobacco: Never   Vaping Use   • Vaping Use: Never used   Substance and Sexual Activity   • Alcohol use: Yes     Comment: occaional   • Drug use: Yes     Frequency: 4.0 times per week     Types: Marijuana   • Sexual activity: Not Currently      Social History     Social History Narrative   • Not on file     Family History   Problem Relation Age of Onset   • Arthritis Mother    • Osteoarthritis Mother    • Other Mother         Immobilized   • Diabetes Sister        Review of Systems        Wt Readings from Last 3 Encounters:   04/12/23 90.7 kg (200 lb)   04/05/23 92.5 kg (204 lb)   08/18/22 92.5 kg (204 lb)     Ht Readings from Last 3  "Encounters:   04/12/23 182.9 cm (72\")   04/05/23 182.9 cm (72\")   08/18/22 182.9 cm (72\")     There is no height or weight on file to calculate BMI.  No height and weight on file for this encounter.  There were no vitals filed for this visit.    Physical Exam    Musculoskeletal:    Right hip (gtb): Skin and soft tissues over the greater trochanteric bursa are tender upon palpation, along with IT band tenderness. Cross body adduction is negative. Internal and external rotations are bothersome and cause tenderness over the greater trochanter. There is no clinical deformity and no shortening. He does not have a limp upon walking. There is not piriformis tightness and there  is not capsular tightness with any rotation. Dorsalis pedis and posterior tibial artery pulses are palpable. Neurovascular status is intact and capillary refill is less than 2 seconds. Common peroneal nerve function is well preserved.    right knee.The patient is status post total knee arthroplasty postoperative 10 month(s). Incision is clean. Calf is soft and nontender. Homans sign is negative. There is no clicking, popping or catching. Anterior and posterior drawer signs are negative.  There is no instability of the components. Appropriate amounts of swelling and bruising are noted. Dorsalis pedis and posterior tibial artery pulses are palpable. Common peroneal nerve function is well preserved. Range of motion is from 0-125 degrees of flexion. Gait is cautious but otherwise fairly normal. There is no evidence of a deep seated joint infection.  Diagnostics:      Procedure:  Large Joint Arthrocentesis: R greater trochanteric bursa  Date/Time: 4/26/2023 10:02 AM  Consent given by: patient  Site marked: site marked  Timeout: Immediately prior to procedure a time out was called to verify the correct patient, procedure, equipment, support staff and site/side marked as required   Supporting Documentation  Indications: pain   Procedure Details  Location: " hip - R greater trochanteric bursa  Preparation: Patient was prepped and draped in the usual sterile fashion  Needle size: 25 G  Approach: anteromedial  Medications administered: 2 mL lidocaine 1 %; 80 mg triamcinolone acetonide 40 MG/ML  Patient tolerance: patient tolerated the procedure well with no immediate complications            Assessment:   Diagnoses and all orders for this visit:    1. S/P TKR (total knee replacement), right (Primary)    2. Trochanteric bursitis of right hip    Other orders  -     Large Joint Arthrocentesis: R greater trochanteric bursa          Plan:   · Injected patient's right hip-greater trochanteric bursa joint(s)with Kenalog from an anterolateral approach   · Compression/brace to the knee to prevent her from buckling and giving out.  · Calcium and vitamin D for bone health.  · Glucosamine and chondroitin for cartilage health.  · , GI and dental procedure prophylaxis with antibiotics to prevent metastatic infection of the knee arthroplasty implants.  · Rest, ice, compression, and elevation (RICE) therapy  · OTC Alternate Ibuprofen and Tylenol as needed  · Follow up in 3 month(s)    Date of encounter: 04/26/2023   Km Felix MD

## 2023-04-27 ENCOUNTER — TELEPHONE (OUTPATIENT)
Dept: FAMILY MEDICINE CLINIC | Facility: CLINIC | Age: 68
End: 2023-04-27
Payer: MEDICARE

## 2023-04-27 NOTE — TELEPHONE ENCOUNTER
"Caller: Nahum Restrepo \"CLAIR\"    Relationship: Self    Best call back number: [phone]      PLEASE HAVE HUM[facility] CALL PATIENT TO RESCHEDULE HIS MEDICARE WELLNESS VISIT       "

## 2023-05-03 DIAGNOSIS — E66.3 OVERWEIGHT (BMI 25.0-29.9): ICD-10-CM

## 2023-05-03 DIAGNOSIS — Z13.220 SCREENING FOR HYPERLIPIDEMIA: ICD-10-CM

## 2023-05-03 DIAGNOSIS — Z12.5 SCREENING FOR PROSTATE CANCER: ICD-10-CM

## 2023-05-03 DIAGNOSIS — R73.09 ELEVATED GLUCOSE: ICD-10-CM

## 2023-05-03 DIAGNOSIS — Z12.11 SCREEN FOR COLON CANCER: ICD-10-CM

## 2023-05-03 DIAGNOSIS — I10 PRIMARY HYPERTENSION: Primary | ICD-10-CM

## 2023-05-03 RX ORDER — GABAPENTIN 100 MG/1
1 CAPSULE ORAL EVERY 12 HOURS SCHEDULED
COMMUNITY
Start: 2023-04-06

## 2023-05-08 ENCOUNTER — LAB (OUTPATIENT)
Dept: FAMILY MEDICINE CLINIC | Facility: CLINIC | Age: 68
End: 2023-05-08
Payer: MEDICARE

## 2023-05-08 DIAGNOSIS — Z13.220 SCREENING FOR HYPERLIPIDEMIA: ICD-10-CM

## 2023-05-08 DIAGNOSIS — R97.20 ELEVATED PSA: Primary | ICD-10-CM

## 2023-05-08 DIAGNOSIS — E66.3 OVERWEIGHT (BMI 25.0-29.9): ICD-10-CM

## 2023-05-08 DIAGNOSIS — R73.09 ELEVATED GLUCOSE: ICD-10-CM

## 2023-05-08 DIAGNOSIS — I10 PRIMARY HYPERTENSION: ICD-10-CM

## 2023-05-08 DIAGNOSIS — Z12.5 SCREENING FOR PROSTATE CANCER: ICD-10-CM

## 2023-05-08 LAB
25(OH)D3 SERPL-MCNC: 32.6 NG/ML (ref 30–100)
ALBUMIN SERPL-MCNC: 4.6 G/DL (ref 3.5–5.2)
ALBUMIN/GLOB SERPL: 1.6 G/DL
ALP SERPL-CCNC: 68 U/L (ref 39–117)
ALT SERPL W P-5'-P-CCNC: 33 U/L (ref 1–41)
ANION GAP SERPL CALCULATED.3IONS-SCNC: 12.3 MMOL/L (ref 5–15)
AST SERPL-CCNC: 21 U/L (ref 1–40)
BASOPHILS # BLD AUTO: 0.03 10*3/MM3 (ref 0–0.2)
BASOPHILS NFR BLD AUTO: 0.4 % (ref 0–1.5)
BILIRUB SERPL-MCNC: 0.4 MG/DL (ref 0–1.2)
BUN SERPL-MCNC: 24 MG/DL (ref 8–23)
BUN/CREAT SERPL: 26.1 (ref 7–25)
CALCIUM SPEC-SCNC: 9.5 MG/DL (ref 8.6–10.5)
CHLORIDE SERPL-SCNC: 103 MMOL/L (ref 98–107)
CHOLEST SERPL-MCNC: 195 MG/DL (ref 0–200)
CO2 SERPL-SCNC: 24.7 MMOL/L (ref 22–29)
CREAT SERPL-MCNC: 0.92 MG/DL (ref 0.76–1.27)
DEPRECATED RDW RBC AUTO: 38.7 FL (ref 37–54)
EGFRCR SERPLBLD CKD-EPI 2021: 90.6 ML/MIN/1.73
EOSINOPHIL # BLD AUTO: 0.25 10*3/MM3 (ref 0–0.4)
EOSINOPHIL NFR BLD AUTO: 3.3 % (ref 0.3–6.2)
ERYTHROCYTE [DISTWIDTH] IN BLOOD BY AUTOMATED COUNT: 12.4 % (ref 12.3–15.4)
GLOBULIN UR ELPH-MCNC: 2.8 GM/DL
GLUCOSE SERPL-MCNC: 105 MG/DL (ref 65–99)
HBA1C MFR BLD: 6 % (ref 4.8–5.6)
HCT VFR BLD AUTO: 44.5 % (ref 37.5–51)
HDLC SERPL-MCNC: 35 MG/DL (ref 40–60)
HGB BLD-MCNC: 15.2 G/DL (ref 13–17.7)
IMM GRANULOCYTES # BLD AUTO: 0.09 10*3/MM3 (ref 0–0.05)
IMM GRANULOCYTES NFR BLD AUTO: 1.2 % (ref 0–0.5)
LDLC SERPL CALC-MCNC: 115 MG/DL (ref 0–100)
LDLC/HDLC SERPL: 3.1 {RATIO}
LYMPHOCYTES # BLD AUTO: 1.9 10*3/MM3 (ref 0.7–3.1)
LYMPHOCYTES NFR BLD AUTO: 25 % (ref 19.6–45.3)
MCH RBC QN AUTO: 29.3 PG (ref 26.6–33)
MCHC RBC AUTO-ENTMCNC: 34.2 G/DL (ref 31.5–35.7)
MCV RBC AUTO: 85.7 FL (ref 79–97)
MONOCYTES # BLD AUTO: 0.74 10*3/MM3 (ref 0.1–0.9)
MONOCYTES NFR BLD AUTO: 9.7 % (ref 5–12)
NEUTROPHILS NFR BLD AUTO: 4.58 10*3/MM3 (ref 1.7–7)
NEUTROPHILS NFR BLD AUTO: 60.4 % (ref 42.7–76)
NRBC BLD AUTO-RTO: 0 /100 WBC (ref 0–0.2)
PLATELET # BLD AUTO: 191 10*3/MM3 (ref 140–450)
PMV BLD AUTO: 9.8 FL (ref 6–12)
POTASSIUM SERPL-SCNC: 4.2 MMOL/L (ref 3.5–5.2)
PROT SERPL-MCNC: 7.4 G/DL (ref 6–8.5)
PSA SERPL-MCNC: 10.6 NG/ML (ref 0–4)
RBC # BLD AUTO: 5.19 10*6/MM3 (ref 4.14–5.8)
SODIUM SERPL-SCNC: 140 MMOL/L (ref 136–145)
TRIGL SERPL-MCNC: 257 MG/DL (ref 0–150)
TSH SERPL DL<=0.05 MIU/L-ACNC: 2.34 UIU/ML (ref 0.27–4.2)
VLDLC SERPL-MCNC: 45 MG/DL (ref 5–40)
WBC NRBC COR # BLD: 7.59 10*3/MM3 (ref 3.4–10.8)

## 2023-05-08 PROCEDURE — 82306 VITAMIN D 25 HYDROXY: CPT | Performed by: FAMILY MEDICINE

## 2023-05-08 PROCEDURE — 85025 COMPLETE CBC W/AUTO DIFF WBC: CPT | Performed by: FAMILY MEDICINE

## 2023-05-08 PROCEDURE — G0103 PSA SCREENING: HCPCS | Performed by: FAMILY MEDICINE

## 2023-05-08 PROCEDURE — 36415 COLL VENOUS BLD VENIPUNCTURE: CPT

## 2023-05-08 PROCEDURE — 80053 COMPREHEN METABOLIC PANEL: CPT | Performed by: FAMILY MEDICINE

## 2023-05-08 PROCEDURE — 83036 HEMOGLOBIN GLYCOSYLATED A1C: CPT | Performed by: FAMILY MEDICINE

## 2023-05-08 PROCEDURE — 80061 LIPID PANEL: CPT | Performed by: FAMILY MEDICINE

## 2023-05-08 PROCEDURE — 84443 ASSAY THYROID STIM HORMONE: CPT | Performed by: FAMILY MEDICINE

## 2023-05-09 ENCOUNTER — TELEPHONE (OUTPATIENT)
Dept: FAMILY MEDICINE CLINIC | Facility: CLINIC | Age: 68
End: 2023-05-09
Payer: MEDICARE

## 2023-05-09 NOTE — PROGRESS NOTES
Napoleon Hammer I will see him at his appointment on the 19th but in the meantime I want to set him up to see a urologist for a very elevated PSA above 10.  I want Dr. Obando's opinion on what we need to do about this.  It is probably nothing but its something we need to follow-up on

## 2023-05-09 NOTE — TELEPHONE ENCOUNTER
PATIENT CALLED BACK AND SAID HE SEES DR RADFORD EVERY 6 MONTHS AND IS SCHEDULED IN July. I LET HIM KNOW THAT WE WOULD GET THE RESULTS SENT TO THEM SO THEY WOULD HAVE THOSE.

## 2023-05-09 NOTE — TELEPHONE ENCOUNTER
Left voicemail and faxed results to dr. Obando    ----- Message from Leonardo Lujan MD sent at 5/8/2023  8:28 PM EDT -----  Tell Harman I will see him at his appointment on the 19th but in the meantime I want to set him up to see a urologist for a very elevated PSA above 10.  I want Dr. Obando's opinion on what we need to do about this.  It is probably nothing but its something we need to follow-up on

## 2023-05-15 RX ORDER — AMLODIPINE BESYLATE 5 MG/1
5 TABLET ORAL DAILY
Qty: 90 TABLET | Refills: 1 | Status: SHIPPED | OUTPATIENT
Start: 2023-05-15

## 2023-05-19 ENCOUNTER — OFFICE VISIT (OUTPATIENT)
Dept: FAMILY MEDICINE CLINIC | Facility: CLINIC | Age: 68
End: 2023-05-19

## 2023-05-19 VITALS
WEIGHT: 213.2 LBS | BODY MASS INDEX: 28.88 KG/M2 | DIASTOLIC BLOOD PRESSURE: 74 MMHG | SYSTOLIC BLOOD PRESSURE: 136 MMHG | OXYGEN SATURATION: 98 % | HEIGHT: 72 IN | HEART RATE: 94 BPM

## 2023-05-19 DIAGNOSIS — R06.83 SNORING: ICD-10-CM

## 2023-05-19 DIAGNOSIS — Z12.11 COLON CANCER SCREENING: ICD-10-CM

## 2023-05-19 DIAGNOSIS — R97.20 ELEVATED PSA: ICD-10-CM

## 2023-05-19 DIAGNOSIS — G60.9 IDIOPATHIC PERIPHERAL NEUROPATHY: ICD-10-CM

## 2023-05-19 DIAGNOSIS — N40.0 BENIGN PROSTATIC HYPERPLASIA WITHOUT URINARY OBSTRUCTION: ICD-10-CM

## 2023-05-19 DIAGNOSIS — Z00.00 MEDICARE ANNUAL WELLNESS VISIT, SUBSEQUENT: Primary | ICD-10-CM

## 2023-05-19 DIAGNOSIS — Z96.651 HX OF TOTAL KNEE REPLACEMENT, RIGHT: ICD-10-CM

## 2023-05-19 DIAGNOSIS — I10 PRIMARY HYPERTENSION: ICD-10-CM

## 2023-05-19 PROBLEM — M25.561 KNEE PAIN, RIGHT: Status: RESOLVED | Noted: 2021-02-17 | Resolved: 2023-05-19

## 2023-05-19 PROBLEM — G89.29 CHRONIC PAIN OF RIGHT KNEE: Status: RESOLVED | Noted: 2022-06-21 | Resolved: 2023-05-19

## 2023-05-19 PROBLEM — M17.11 PRIMARY OSTEOARTHRITIS OF RIGHT KNEE: Status: RESOLVED | Noted: 2021-05-18 | Resolved: 2023-05-19

## 2023-05-19 PROBLEM — Z98.890 S/P RIGHT KNEE ARTHROSCOPY: Status: RESOLVED | Noted: 2021-07-07 | Resolved: 2023-05-19

## 2023-05-19 PROBLEM — M25.561 CHRONIC PAIN OF RIGHT KNEE: Status: RESOLVED | Noted: 2022-06-21 | Resolved: 2023-05-19

## 2023-05-19 NOTE — PROGRESS NOTES
The ABCs of the Annual Wellness Visit  Subsequent Medicare Wellness Visit    Subjective    Nahum Restrepo is a 67 y.o. male who presents for a Subsequent Medicare Wellness Visit.    The following portions of the patient's history were reviewed and   updated as appropriate: allergies, current medications, past family history, past medical history, past social history, past surgical history and problem list.    Compared to one year ago, the patient feels his physical   health is the same.    Compared to one year ago, the patient feels his mental   health is the same.    Recent Hospitalizations:  He was admitted within the past 365 days at [facility].       Current Medical Providers:  Patient Care Team:  Leonardo Lujan MD as PCP - General  Km Felix MD as Surgeon (Orthopedic Surgery)    Outpatient Medications Prior to Visit   Medication Sig Dispense Refill    alfuzosin (UROXATRAL) 10 MG 24 hr tablet Take 1 tablet by mouth Daily.      ALPRAZolam (XANAX) 0.5 MG tablet TAKE 1 TABLET BY MOUTH TWICE DAILY AS NEEDED 60 tablet 1    amLODIPine (NORVASC) 5 MG tablet TAKE 1 TABLET BY MOUTH DAILY 90 tablet 1    gabapentin (NEURONTIN) 100 MG capsule Take 1 capsule by mouth Every 12 (Twelve) Hours.      metoprolol succinate XL (TOPROL-XL) 50 MG 24 hr tablet TAKE 1 TABLET BY MOUTH DAILY 90 tablet 3    multivitamin with minerals tablet tablet Take 1 tablet by mouth Daily.      valsartan-hydrochlorothiazide (DIOVAN-HCT) 320-25 MG per tablet TAKE 1 TABLET BY MOUTH DAILY 90 tablet 1    meloxicam (Mobic) 7.5 MG tablet Take 1 tablet by mouth Daily. (Patient not taking: Reported on 5/19/2023) 30 tablet 2    methocarbamol (ROBAXIN) 500 MG tablet Take 1 tablet by mouth 4 (Four) Times a Day. (Patient not taking: Reported on 5/19/2023) 30 tablet 0     No facility-administered medications prior to visit.       No opioid medication identified on active medication list. I have reviewed chart for other potential  high risk  "medication/s and harmful drug interactions in the elderly.        Aspirin is not on active medication list.  Aspirin use is not indicated based on review of current medical condition/s. Risk of harm outweighs potential benefits.  .    Patient Active Problem List   Diagnosis    Benign prostatic hyperplasia without urinary obstruction    Elevated PSA    Encounter for general adult medical examination without abnormal findings    Hypertension    Peripheral neuropathy    Rotator cuff injury, right, initial encounter    Snoring    Overweight (BMI 25.0-29.9)    Medicare annual wellness visit, subsequent    Screen for colon cancer    Hx of total knee replacement, right    Postoperative hematoma of musculoskeletal structure following musculoskeletal procedure    Trochanteric bursitis of right hip     Advance Care Planning   Advance Care Planning     Advance Directive is not on file.  ACP discussion was held with the patient during this visit. Patient does not have an advance directive, declines further assistance.     Objective    Vitals:    05/19/23 1336   BP: 136/74   Pulse: 94   SpO2: 98%   Weight: 96.7 kg (213 lb 3.2 oz)   Height: 182.9 cm (72.01\")     Estimated body mass index is 28.91 kg/m² as calculated from the following:    Height as of this encounter: 182.9 cm (72.01\").    Weight as of this encounter: 96.7 kg (213 lb 3.2 oz).          Does the patient have evidence of cognitive impairment? No    Lab Results   Component Value Date    TRIG 257 (H) 05/08/2023    HDL 35 (L) 05/08/2023     (H) 05/08/2023    VLDL 45 (H) 05/08/2023    HGBA1C 6.00 (H) 05/08/2023     Results:     Lab results from 05/08/2023 were reviewed. His TSH was normal. His PSA was 10.6. His blood glucose was 105. His BUN was slightly elevated. His creatinine was normal. His electrolytes were normal. His calcium, protein and albumin were normal. His liver function was normal. His electrolytes were normal. His total cholesterol was 195. His " triglycerides were 257. His HDL was 35. His LDL was 115. His PSA was normal. His CBC was normal. His white blood cell count was normal. His hemoglobin was normal.       HEALTH RISK ASSESSMENT    Smoking Status:  Social History     Tobacco Use   Smoking Status Never    Passive exposure: Never   Smokeless Tobacco Never     Alcohol Consumption:  Social History     Substance and Sexual Activity   Alcohol Use Yes    Comment: occaional     Fall Risk Screen:    RICHARD Fall Risk Assessment was completed, and patient is at LOW risk for falls.Assessment completed on:2023    Depression Screenin/19/2023     1:34 PM   PHQ-2/PHQ-9 Depression Screening   Little Interest or Pleasure in Doing Things 2-->more than half the days   Feeling Down, Depressed or Hopeless 0-->not at all   PHQ-9: Brief Depression Severity Measure Score 2       Health Habits and Functional and Cognitive Screenin/19/2023     1:34 PM   Functional & Cognitive Status   Do you have difficulty preparing food and eating? No   Do you have difficulty bathing yourself, getting dressed or grooming yourself? No   Do you have difficulty using the toilet? No   Do you have difficulty moving around from place to place? No   Do you have trouble with steps or getting out of a bed or a chair? No   Current Diet Well Balanced Diet   Dental Exam Up to date   Eye Exam Not up to date   Exercise (times per week) 4 times per week   Current Exercises Include Walking   Do you need help using the phone?  No   Are you deaf or do you have serious difficulty hearing?  No   Do you need help with transportation? No   Do you need help shopping? No   Do you need help preparing meals?  No   Do you need help with housework?  No   Do you need help with laundry? No   Do you need help taking your medications? No   Do you need help managing money? No   Do you ever drive or ride in a car without wearing a seat belt? No   Have you felt unusual stress, anger or loneliness in the  last month? No   Who do you live with? Alone   If you need help, do you have trouble finding someone available to you? No   Do you have difficulty concentrating, remembering or making decisions? No       Age-appropriate Screening Schedule:  Refer to the list below for future screening recommendations based on patient's age, sex and/or medical conditions. Orders for these recommended tests are listed in the plan section. The patient has been provided with a written plan.    Health Maintenance   Topic Date Due    COLORECTAL CANCER SCREENING  Never done    TDAP/TD VACCINES (1 - Tdap) Never done    ZOSTER VACCINE (1 of 2) Never done    HEPATITIS C SCREENING  Never done    COVID-19 Vaccine (4 - Pfizer series) 02/15/2022    INFLUENZA VACCINE  08/01/2023    ANNUAL WELLNESS VISIT  05/19/2024    Pneumococcal Vaccine 65+  Completed                  CMS Preventative Services Quick Reference  Risk Factors Identified During Encounter  None Identified  The above risks/problems have been discussed with the patient.  Pertinent information has been shared with the patient in the After Visit Summary.  An After Visit Summary and PPPS were made available to the patient.    Follow Up:   Next Medicare Wellness visit to be scheduled in 1 year.       Additional E&M Note during same encounter follows:  Patient has multiple medical problems which are significant and separately identifiable that require additional work above and beyond the Medicare Wellness Visit.      Chief Complaint  Medicare Wellness-subsequent    Subjective      HPI  Nahum Restrepo  for a Medicare wellness visit.    He had a right knee arthroplasty performed by Dr. Pruitt, that went well.    His prostate level was elevated when he did his blood work the other day and he sees Dr. Obando every 6 months. In 01/2023, his PSA was 7, but now it is 10. He denies any symptoms. He has already had a biopsy preformed and a magnetic resonance image preformed on it 3 years ago. He  "has thought about being sexually active, but it is not better right now.    He does not monitor his blood pressure at home, although he needs to start because he feels like his blood pressure was off the chart approximately 1 month ago. He had to go to urgent care because of his back and his blood pressure was \"vahid high\". He did not take his blood pressure medication before going to urgent care. If he takes his blood pressure medication, his blood pressure is good.     He got up yesterday morning on 05/19/2023 and was off balance. His head is hurting, and he feels like he needs to have his head looked at. Occasionally it feels like his brain is scrambling. He experiences headaches in the morning, but not all the time and he does not have them during the day much. His energy level is low. He does not have a sleep mask to wear during the night. He believes he needs to go ahead and have a sleep study performed. He has been informed that he snores.    His rotator cuff is doing well and he did not have surgery on it, because it healed itself. He had hurt during COVID-19, and he could have had surgery done on it and then it healed itself.    He still experiences numbness and tingling in his feet, which he takes gabapentin for.     He received a cortisone injection a couple of weeks ago for his bursa and it has not bothered him since.    He is not diabetic. He has not seen an eye doctor in a couple of years. 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 He knows he needs to have it done again. He does not have any reason to see a podiatrist. He sees Dr. Hall.     He sees a dentist routinely. It has been 6 months since he saw a dermatologist.    He has received 3 doses of the COVID-19 vaccine. He has not experienced COVID-19.    He is going to [place] in 08/2023.    His right knee arthroplasty was painful. He was barely able to walk into the " "hospital to have it done. It was really painful after the block wore off. He iced his knee and took ibuprofen. He has not had trouble with his knees yet. He had cartilage removed in high school in his knee. His bilateral knees do not really hurt too much at the end of a long day, although he does get a little achy occasionally, but not like what he had. He will possibly receive a knee replacement someday, but he is not looking forward to it.     He feels like he has a little bit of depression, and he takes so many pills. His girlfriend of 15 years killed herself 3 years ago and he has not talked to anyone about it and his sister is telling him that he needs to talk to someone.           Objective   Vital Signs:  /74   Pulse 94   Ht 182.9 cm (72.01\")   Wt 96.7 kg (213 lb 3.2 oz)   SpO2 98%   BMI 28.91 kg/m²     Physical Exam  Constitutional:       Appearance: Normal appearance. He is well-developed and normal weight.   HENT:      Head: Normocephalic and atraumatic.      Right Ear: Tympanic membrane, ear canal and external ear normal.      Left Ear: Tympanic membrane, ear canal and external ear normal.      Nose: Nose normal.      Mouth/Throat:      Mouth: Mucous membranes are moist.      Pharynx: Oropharynx is clear. No oropharyngeal exudate.   Eyes:      Extraocular Movements: Extraocular movements intact.      Conjunctiva/sclera: Conjunctivae normal.      Pupils: Pupils are equal, round, and reactive to light.   Cardiovascular:      Rate and Rhythm: Normal rate and regular rhythm.      Pulses: Normal pulses.      Heart sounds: Normal heart sounds.   Pulmonary:      Effort: Pulmonary effort is normal.      Breath sounds: Normal breath sounds.   Abdominal:      General: Bowel sounds are normal.      Palpations: Abdomen is soft.   Musculoskeletal:         General: Normal range of motion.      Cervical back: Normal range of motion and neck supple.   Skin:     General: Skin is warm and dry.   Neurological:    "   General: No focal deficit present.      Mental Status: He is alert and oriented to person, place, and time. Mental status is at baseline.   Psychiatric:         Mood and Affect: Mood normal.         Behavior: Behavior normal.         Thought Content: Thought content normal.         Judgment: Judgment normal.      Assessment and Plan     1. Medicare annual wellness visit, subsequent  Upon arrival to the room the patient underwent the Medicare health risk assessment.  Neither the questions themselves or the answers that were given prompted any major concern on the part of the patient or by the medical staff that gave the assessment.  As far as the preventative care examinations and the preventative care immunizations that this patient requires they are as listed below.   Screening tests recommended:    Colonoscopy- will schedule   eye exam- will have done  foot exam- podiatry Dr. Medina  PSA-elevated -Dr. Obando  Dentist- yearly  Derm-Banet to see  Immunization:  Influenza-  utd  Prevnar -Prevnar 20  Pneumovax- Prevnar 20  Tetanus at pharm  Shingles vaccine- at pharm  Hepatitis - utd  Covid  - utd                  2. Primary hypertension.   - The patient is under treatment for hypertension, but his blood pressure readings are excellent. We will continue his current medications.    3. Elevated PSA.   - The patient's PSA went up about 3 points this past year. He is now up to almost 10 and he is followed by Dr. Wilton Obando. I am going to send him back to Dr. Obando because of this sudden elevation. He has had biopsies done in the past and he has had an MRI and all of this was negative, but because of the sudden rise, I think he should be checked again.    4. BPH without urinary obstruction.  - The patient does take Uroxatral because of some slowness of his stream and we will see if anything comes of the elevated PSA.    5. History of total right knee replacement.  - The patient had degenerative arthritis of his knees,  particularly on the right side. He had a total knee replacement last year and did well with it and the recovery was a little rough, but now he is doing great and feels well.    6. Snoring.  - The patient has always had some snoring issues and now he is dealing with waking up and being tired and then daytime fatigue and sleepiness. He needs to have a sleep study done soon, so I am going to set him up for a sleep study at the  [facility].    7. Idiopathic peripheral neuropathy.   - The patient has burning, tingling, and numbness in his feet. He takes gabapentin for this and it seems to control his symptoms fairly well. He also should probably start on some Metanx food supplement and we will see how this goes. He does see a podiatrist to follow along so that the ulcers do not develop on his feet.      Follow Up Return in about 1 year (around 5/19/2024), or if symptoms worsen or fail to improve, for Medicare Wellness.  Patient was given instructions and counseling regarding his condition or for health maintenance advice. Please see specific information pulled into the AVS if appropriate.           Transcribed from ambient dictation for Leonardo Lujan MD by Suha Pastor.  05/19/23   16:39 EDT

## 2023-05-31 ENCOUNTER — TELEPHONE (OUTPATIENT)
Dept: FAMILY MEDICINE CLINIC | Facility: CLINIC | Age: 68
End: 2023-05-31

## 2023-05-31 NOTE — TELEPHONE ENCOUNTER
"Caller: Nahum Restrepo \"CLAIR\"    Relationship: Self    Best call back number: [phone]    PATIENT WOULD LIKE TO GET AN UPDATE ON HIS REFERRALS FOR SLEEP STUDY AND HIS COLONOSCOPY ENTERED ON 5/19/2023. VOICEMAIL OKAY  "

## 2023-07-19 ENCOUNTER — OFFICE VISIT (OUTPATIENT)
Dept: ORTHOPEDIC SURGERY | Facility: CLINIC | Age: 68
End: 2023-07-19
Payer: MEDICARE

## 2023-07-19 VITALS — HEIGHT: 72 IN | BODY MASS INDEX: 27.71 KG/M2 | WEIGHT: 204.6 LBS

## 2023-07-19 DIAGNOSIS — Z96.651 HX OF TOTAL KNEE REPLACEMENT, RIGHT: ICD-10-CM

## 2023-07-19 DIAGNOSIS — M70.61 TROCHANTERIC BURSITIS OF RIGHT HIP: Primary | ICD-10-CM

## 2023-07-19 RX ADMIN — LIDOCAINE HYDROCHLORIDE 2 ML: 10 INJECTION, SOLUTION EPIDURAL; INFILTRATION; INTRACAUDAL; PERINEURAL at 12:41

## 2023-07-19 RX ADMIN — METHYLPREDNISOLONE ACETATE 160 MG: 80 INJECTION, SUSPENSION INTRA-ARTICULAR; INTRALESIONAL; INTRAMUSCULAR; SOFT TISSUE at 12:41

## 2023-07-31 RX ORDER — METHYLPREDNISOLONE ACETATE 80 MG/ML
160 INJECTION, SUSPENSION INTRA-ARTICULAR; INTRALESIONAL; INTRAMUSCULAR; SOFT TISSUE
Status: COMPLETED | OUTPATIENT
Start: 2023-07-19 | End: 2023-07-19

## 2023-07-31 RX ORDER — LIDOCAINE HYDROCHLORIDE 10 MG/ML
2 INJECTION, SOLUTION EPIDURAL; INFILTRATION; INTRACAUDAL; PERINEURAL
Status: COMPLETED | OUTPATIENT
Start: 2023-07-19 | End: 2023-07-19

## 2023-08-01 NOTE — PROGRESS NOTES
----- Message from Jose Sanabria MD sent at 8/1/2023 12:37 PM EDT -----  Please call the patient regarding her abnormal result.  Cholesterol is still high patient ideally needs to be on atorvastatin 10 mg daily.  Also patient is mildly anemic she can submit FOBT x3   Physical Therapy Initial Evaluation and Plan of Care    Patient: Nahum Restrepo   : 1955  Diagnosis/ICD-10 Code:  History of total right knee replacement [Z96.651]  Referring practitioner: DELROY Khan  Date of Initial Visit: 2022  Today's Date: 2022  Patient seen for 1 sessions           Visit Diagnoses:    ICD-10-CM ICD-9-CM   1. History of total right knee replacement  Z96.651 V43.65       Subjective Questionnaire: Knee outcomes score 43%      Subjective 67 yo male s/p R TKA 22. Reports no post op complications. Had HHPT, which went well. DC'd 1 week ago. Has stairs to his basement, having difficulty ambulating these. Doing well on main level. Had full ROM pre op per prior PT eval. 5/10 pain now and 8/10 at worst. Having some difficulty with prolonged standing, walking on uneven surfaces. Intermittent HEP participation per pt. No longer using assistive device.  PA/MD follow up: 22  Social hx: Retired       Objective          Active Range of Motion   Left Knee   Normal active range of motion    Right Knee   Flexion: 97 degrees   Extension: Right knee active extension: -2.     Passive Range of Motion     Right Knee   Flexion: 100 degrees   Extension: Right knee passive extension: -3.     Strength/Myotome Testing     Left Knee   Normal strength    Right Knee   Flexion: 4  Extension: 4    Swelling     Left Knee Girth Measurement (cm)   Joint line: 45 cm  10 cm above joint line: 46 cm  10 cm below joint line: 38 cm    Right Knee Girth Measurement (cm)   Joint line: 40 cm  10 cm above joint line: 45 cm  10 cm below joint line: 35 cm      Incision healing well, no signs of discharge. Significant swelling noted.  Antalgic gait.      Assessment & Plan     Assessment  Impairments: abnormal gait, abnormal or restricted ROM, activity intolerance, impaired balance, impaired physical strength, lacks appropriate home exercise program, pain with function and weight-bearing  intolerance  Other impairment: joint swelling  Functional Limitations: carrying objects, lifting, walking, pulling, pushing, uncomfortable because of pain, standing and unable to perform repetitive tasks  Assessment details: 67 yo male s/p R TKA. Pt is with a good response to treatment this date and would benefit from further evaluation and treatment to address the above impairments. Pt's swelling appears to be a limiting factor re: flexion ROM, pt advised to elevate and ice frequently over the next week to address this.    Prognosis: good    Goals  Plan Goals: Short term goals, 1 week: Tolerate HEP progression.  Voice compliance with activity modification.  Report improvement in symptoms.    LTGs, 6 weeks: Improve knee outcomes score to 85%.  AROM to be 0-115 deg.  5/5 LE strength throughout.  Ambulate 20 min without assistive device and with no to minimal gait impairment.  Independent with HEP.    Plan  Therapy options: will be seen for skilled therapy services  Other planned modality interventions: modalities prn  Planned therapy interventions: balance/weight-bearing training, flexibility, functional ROM exercises, gait training, home exercise program, manual therapy, neuromuscular re-education, strengthening, stretching and therapeutic activities  Frequency: 2x week  Duration in weeks: 6  Treatment plan discussed with: patient        History # of Personal Factors and/or Comorbidities: MODERATE (1-2)  Examination of Body System(s): # of elements: LOW (1-2)  Clinical Presentation: STABLE   Clinical Decision Making: LOW      Timed:         Manual Therapy:    15     mins  81538;     Therapeutic Exercise:    15     mins  02523;     Neuromuscular Leanna:        mins  29334;    Therapeutic Activity:          mins  74659;     Gait Training:           mins  25855;     Ultrasound:          mins  25833;    Ionto                                   mins   04499  Self Care                            mins    10535    Un-Timed:  Electrical Stimulation:         mins  30442 ( );  Traction          mins 91943  Low Eval     15     Mins  59506  Mod Eval          Mins  90203  High Eval                            Mins  10332  Re-Eval                               mins  90681        Timed Treatment:   30   mins   Total Treatment:     45   mins      PT SIGNATURE: Anup Stewart PT, DPT, OCS  [place] license: [id number]  DATE TREATMENT INITIATED: 7/13/2022    Certification Period: @TDA @thru 10/10/2022  I certify that the therapy services are furnished while this patient is under my care.  The services outlined above are required for this patient and will be reviewed every 90 days.     PHYSICIAN: _____________________________    Cindy Boo PA      DATE:     Please sign and return via fax to [unfilled] . Thank you, [facility].

## 2023-08-03 ENCOUNTER — OFFICE VISIT (OUTPATIENT)
Dept: SLEEP MEDICINE | Facility: CLINIC | Age: 68
End: 2023-08-03
Payer: MEDICARE

## 2023-08-03 VITALS — HEART RATE: 69 BPM | OXYGEN SATURATION: 96 %

## 2023-08-03 DIAGNOSIS — R29.818 SUSPECTED SLEEP APNEA: Primary | ICD-10-CM

## 2023-08-03 DIAGNOSIS — R06.83 SNORING: ICD-10-CM

## 2023-08-03 DIAGNOSIS — G47.10 HYPERSOMNOLENCE: ICD-10-CM

## 2023-08-03 DIAGNOSIS — G47.8 NON-RESTORATIVE SLEEP: ICD-10-CM

## 2023-08-31 RX ORDER — MELOXICAM 7.5 MG/1
TABLET ORAL
Qty: 30 TABLET | Refills: 3 | Status: SHIPPED | OUTPATIENT
Start: 2023-08-31

## 2023-09-20 ENCOUNTER — HOSPITAL ENCOUNTER (OUTPATIENT)
Dept: SLEEP MEDICINE | Facility: HOSPITAL | Age: 68
End: 2023-09-20
Payer: MEDICARE

## 2023-09-20 DIAGNOSIS — G47.10 HYPERSOMNOLENCE: ICD-10-CM

## 2023-09-20 DIAGNOSIS — R06.83 SNORING: ICD-10-CM

## 2023-09-20 DIAGNOSIS — R29.818 SUSPECTED SLEEP APNEA: ICD-10-CM

## 2023-09-20 DIAGNOSIS — G47.8 NON-RESTORATIVE SLEEP: ICD-10-CM

## 2023-09-20 PROCEDURE — 95806 SLEEP STUDY UNATT&RESP EFFT: CPT

## 2023-09-27 DIAGNOSIS — F41.9 ANXIETY: ICD-10-CM

## 2023-09-27 DIAGNOSIS — G47.33 OSA (OBSTRUCTIVE SLEEP APNEA): Primary | ICD-10-CM

## 2023-09-27 DIAGNOSIS — R06.83 SNORING: ICD-10-CM

## 2023-09-28 RX ORDER — ALPRAZOLAM 0.5 MG/1
TABLET ORAL
Qty: 60 TABLET | Refills: 1 | Status: SHIPPED | OUTPATIENT
Start: 2023-09-28

## 2023-10-02 ENCOUNTER — OFFICE VISIT (OUTPATIENT)
Dept: FAMILY MEDICINE CLINIC | Facility: CLINIC | Age: 68
End: 2023-10-02
Payer: MEDICARE

## 2023-10-02 VITALS
DIASTOLIC BLOOD PRESSURE: 78 MMHG | SYSTOLIC BLOOD PRESSURE: 149 MMHG | OXYGEN SATURATION: 97 % | RESPIRATION RATE: 16 BRPM | WEIGHT: 202.6 LBS | BODY MASS INDEX: 27.44 KG/M2 | HEART RATE: 88 BPM | TEMPERATURE: 98.4 F | HEIGHT: 72 IN

## 2023-10-02 DIAGNOSIS — M54.17 LUMBOSACRAL RADICULOPATHY: Primary | ICD-10-CM

## 2023-10-02 DIAGNOSIS — G89.29 CHRONIC RIGHT-SIDED LOW BACK PAIN WITHOUT SCIATICA: ICD-10-CM

## 2023-10-02 DIAGNOSIS — M54.50 CHRONIC RIGHT-SIDED LOW BACK PAIN WITHOUT SCIATICA: ICD-10-CM

## 2023-10-02 PROCEDURE — 1160F RVW MEDS BY RX/DR IN RCRD: CPT | Performed by: PHYSICIAN ASSISTANT

## 2023-10-02 PROCEDURE — 3077F SYST BP >= 140 MM HG: CPT | Performed by: PHYSICIAN ASSISTANT

## 2023-10-02 PROCEDURE — 1159F MED LIST DOCD IN RCRD: CPT | Performed by: PHYSICIAN ASSISTANT

## 2023-10-02 PROCEDURE — 3078F DIAST BP <80 MM HG: CPT | Performed by: PHYSICIAN ASSISTANT

## 2023-10-02 RX ORDER — METHOCARBAMOL 500 MG/1
500 TABLET, FILM COATED ORAL 4 TIMES DAILY
Qty: 60 TABLET | Refills: 0 | Status: SHIPPED | OUTPATIENT
Start: 2023-10-02

## 2023-10-02 NOTE — PROGRESS NOTES
"Subjective   Nahum Restrepo is a 67 y.o. male.     Chief Complaint   Patient presents with    Back Pain     Back pain X 3 months . No known injury       /78   Pulse 88   Temp 98.4 °F (36.9 °C) (Infrared)   Resp 16   Ht 182.9 cm (72.01\")   Wt 91.9 kg (202 lb 9.6 oz)   SpO2 97%   BMI 27.47 kg/m²     BP Readings from Last 3 Encounters:   10/02/23 149/78   09/25/23 165/91   09/14/23 139/77       Wt Readings from Last 3 Encounters:   10/02/23 91.9 kg (202 lb 9.6 oz)   09/25/23 89.8 kg (198 lb)   09/14/23 89.8 kg (198 lb)       Back Pain   Presents to the clinic for back pain. He has had this pain for the past 6 months or so. The pain is mainly in the lower back. Radiates down into the right leg. Has some weakness. No bowel or bladder incontinence or retention. Has tried otc meds and prednisone without relief. Had xrays of both the hip and the back.     The following portions of the patient's history were reviewed and updated as appropriate: allergies, current medications, past family history, past medical history, past social history, past surgical history, and problem list.    Review of Systems   Musculoskeletal:  Positive for back pain.     Objective   Physical Exam  Constitutional:       Appearance: Normal appearance.   Eyes:      Extraocular Movements: Extraocular movements intact.      Pupils: Pupils are equal, round, and reactive to light.   Musculoskeletal:         General: Tenderness (bilateral lower back over si joint. 5/5 strength bilateral) present.   Neurological:      General: No focal deficit present.      Mental Status: He is alert and oriented to person, place, and time.   Psychiatric:         Mood and Affect: Mood normal.         Behavior: Behavior normal.         Diagnoses and all orders for this visit:    1. Lumbosacral radiculopathy (Primary)  -     MRI Lumbar Spine Without Contrast; Future    Order mri. Continue home exercises. We will let him know what the next step is once the mri is " done.     Return if symptoms worsen or fail to improve.

## 2023-10-13 ENCOUNTER — HOSPITAL ENCOUNTER (OUTPATIENT)
Dept: MRI IMAGING | Facility: HOSPITAL | Age: 68
Discharge: HOME | End: 2023-10-13
Admitting: PHYSICIAN ASSISTANT
Payer: MEDICARE

## 2023-10-13 DIAGNOSIS — M48.26 BAASTRUP DISEASE OF LUMBAR SPINE: ICD-10-CM

## 2023-10-13 DIAGNOSIS — M48.062 SPINAL STENOSIS OF LUMBAR REGION WITH NEUROGENIC CLAUDICATION: Primary | ICD-10-CM

## 2023-10-13 DIAGNOSIS — M54.17 LUMBOSACRAL RADICULOPATHY: ICD-10-CM

## 2023-10-13 PROCEDURE — 72148 MRI LUMBAR SPINE W/O DYE: CPT

## 2023-10-17 ENCOUNTER — TELEPHONE (OUTPATIENT)
Dept: FAMILY MEDICINE CLINIC | Facility: CLINIC | Age: 68
End: 2023-10-17
Payer: MEDICARE

## 2023-10-17 NOTE — TELEPHONE ENCOUNTER
"    Hub staff attempted to follow warm transfer process and was unsuccessful     Caller: Nahum Restrepo \"CLAIR\"    Relationship to patient: Self    Best call back number: [phone]     Patient is needing: PATIENT IS REQUESTING A STATUS UPDATE ON HIS BACK REFERRAL. PLEASE CALL AND ADVISE.          "

## 2023-10-18 NOTE — PROGRESS NOTES
Neurosurgical Consultation      Nahum Restrepo is a 67 y.o. male is being seen for consultation today at the request of Real Andrade PA-C for low back pain. Today patient reports low back pain into his right hip.    Chief Complaint   Patient presents with    Back Pain     New evaluation        Previous treatment: Right Hip injections x 3 ,Mobic,Robaxin,tylenol    HPI: This is a 67-year-old gentleman with a history of neuropathy who presents with approximately 2 months of of low back pain.  He does not identify a clear initiating event.  His back pain is in the low lumbar spine and a little bit towards the right side.  His pain is significantly worse in the morning.  It improves with activity.  The pain is sharp in nature.  He does get relief with meloxicam and Tylenol.  He did attempt muscle relaxers without profound improvement.  He has not had any physical therapy.  He has never had any spinal canal injections.  He does not have pain that radiates anywhere including into his legs.  He does not have any numbness or tingling.  He does not have any bowel or bladder dysfunction.  He does not have any saddle anesthesia.    Past Medical History:   Diagnosis Date    Hypertension     Mild anxiety     Primary osteoarthritis of right knee 05/18/2021    S/P right knee arthroscopy 07/07/2021        Past Surgical History:   Procedure Laterality Date    KNEE ARTHROSCOPY Right 06/16/2021    Procedure: KNEE ARTHROSCOPY with chondroplasty with medial meniscectomy;  Surgeon: Km Felix MD;  Location: [facility];  Service: Orthopedics;  Laterality: Right;    REPLACEMENT TOTAL KNEE Right 2022    SHOULDER SURGERY Left     TOTAL KNEE ARTHROPLASTY Right 06/21/2022    Procedure: TOTAL KNEE ARTHROPLASTY WITH MINGO ROBOT;  Surgeon: Km Felix MD;  Location: [facility] OR;  Service: Robotics - Ortho;  Laterality: Right;        Current Outpatient Medications on File Prior to Visit   Medication Sig Dispense Refill     "alfuzosin (UROXATRAL) 10 MG 24 hr tablet Take 1 tablet by mouth Daily.      ALPRAZolam (XANAX) 0.5 MG tablet TAKE 1 TABLET BY MOUTH TWICE DAILY AS NEEDED 60 tablet 1    amLODIPine (NORVASC) 5 MG tablet TAKE 1 TABLET BY MOUTH DAILY 90 tablet 1    gabapentin (NEURONTIN) 100 MG capsule Take 1 capsule by mouth Every 12 (Twelve) Hours.      meloxicam (MOBIC) 7.5 MG tablet 1 Oral Daily with food. 30 tablet 3    methocarbamol (ROBAXIN) 500 MG tablet Take 1 tablet by mouth 4 (Four) Times a Day. 60 tablet 0    metoprolol succinate XL (TOPROL-XL) 50 MG 24 hr tablet TAKE 1 TABLET BY MOUTH DAILY 90 tablet 3    multivitamin with minerals tablet tablet Take 1 tablet by mouth Daily.      valsartan-hydrochlorothiazide (DIOVAN-HCT) 320-25 MG per tablet TAKE 1 TABLET BY MOUTH DAILY 90 tablet 1     No current facility-administered medications on file prior to visit.        No Known Allergies     Social History     Socioeconomic History    Marital status:    Tobacco Use    Smoking status: Never     Passive exposure: Never    Smokeless tobacco: Never   Vaping Use    Vaping Use: Never used   Substance and Sexual Activity    Alcohol use: Yes     Comment: occaional    Drug use: Yes     Frequency: 4.0 times per week     Types: Marijuana    Sexual activity: Not Currently          Review of Systems   Constitutional: Negative.    HENT: Negative.     Eyes: Negative.    Respiratory: Negative.     Cardiovascular: Negative.    Gastrointestinal: Negative.    Endocrine: Negative.    Genitourinary: Negative.    Musculoskeletal:  Positive for arthralgias, back pain (right hip) and myalgias.   Skin: Negative.    Allergic/Immunologic: Negative.    Neurological: Negative.    Hematological: Negative.    Psychiatric/Behavioral: Negative.          Physical Examination:     Vitals:    10/24/23 1457   BP: 143/79   Pulse: 87   Weight: 94.5 kg (208 lb 6.4 oz)   Height: 182.9 cm (72.01\")   PainSc:   8        Physical Exam  Eyes:      General: Lids are " normal.      Extraocular Movements: Extraocular movements intact.      Pupils: Pupils are equal, round, and reactive to light.   Neurological:      Motor: Motor strength is normal.     Deep Tendon Reflexes:      Reflex Scores:       Patellar reflexes are 1+ on the right side and 1+ on the left side.       Achilles reflexes are 0 on the right side and 0 on the left side.  Psychiatric:         Speech: Speech normal.          Neurological Exam  Mental Status  Awake, alert and oriented to person, place and time. Speech is normal. Language is fluent with no aphasia.    Cranial Nerves  CN II: Visual fields full to confrontation.  CN III, IV, VI: Extraocular movements intact bilaterally. Normal lids and orbits bilaterally. Pupils equal round and reactive to light bilaterally.  CN V: Facial sensation is normal.  CN VII: Full and symmetric facial movement.  CN IX, X: Palate elevates symmetrically  CN XI: Shoulder shrug strength is normal.  CN XII: Tongue midline without atrophy or fasciculations.    Motor  Normal muscle bulk throughout. No fasciculations present. Normal muscle tone. Strength is 5/5 throughout all four extremities.    Sensory  Light touch is normal in upper and lower extremities.     Reflexes                                            Right                      Left  Patellar                                1+                         1+  Achilles                                0                         0    Right pathological reflexes: Damián's absent.  Left pathological reflexes: Damián's absent.    Gait   Able to rise from chair without using arms.       Result Review  The following data was reviewed by: Juan Diego Rivera MD on 10/24/2023:    Data reviewed : Radiologic studies MRI of the lumbar spine without contrast from October 13 shows a broad-based disc bulge at L3-L4 with lateral recess stenosis as well as central stenosis.  There is neuroforaminal stenosis at multiple levels.  There is also facet  widening at L3-L4 and L4-L5.        Assessment/plan:  This is a 67-year-old gentleman who sounds to have some component of arthritic spinal pain.  This is worse in the morning and improves with activity.  He has been taking nonsteroidal anti-inflammatories as well as Tylenol.  He has not had any physical therapy.  He explicitly expresses that this does not significantly affect his quality of life.  I recommend a course of physical therapy.  I recommend topical cream in order to address the focal pain.  I recommend a flexion-extension x-ray to evaluate for dynamic spondylolisthesis especially with the widened facet joints.  He will follow-up with me in approximately 3 months for clinical reevaluation.    Diagnoses and all orders for this visit:    1. Arthritis of low back (Primary)  -     XR Spine Lumbar Complete With Flex & Ext; Future  -     Ambulatory Referral to Physical Therapy Evaluate and treat    Other orders  -     Ibuprofen 3 %, Gabapentin 10 %, Baclofen 2 %, lidocaine 4 %; Apply 1-2 g topically to the appropriate area as directed 3 (Three) to 4 (Four) times daily.  Dispense: 90 g; Refill: 5         Return in about 3 months (around 1/24/2024).            Juan Diego Rivera MD

## 2023-10-24 ENCOUNTER — OFFICE VISIT (OUTPATIENT)
Dept: NEUROSURGERY | Facility: CLINIC | Age: 68
End: 2023-10-24
Payer: MEDICARE

## 2023-10-24 VITALS
BODY MASS INDEX: 28.23 KG/M2 | HEIGHT: 72 IN | HEART RATE: 87 BPM | SYSTOLIC BLOOD PRESSURE: 143 MMHG | DIASTOLIC BLOOD PRESSURE: 79 MMHG | WEIGHT: 208.4 LBS

## 2023-10-24 DIAGNOSIS — M47.819 ARTHRITIS OF LOW BACK: Primary | ICD-10-CM

## 2023-10-24 PROCEDURE — 1159F MED LIST DOCD IN RCRD: CPT | Performed by: NEUROLOGICAL SURGERY

## 2023-10-24 PROCEDURE — 99204 OFFICE O/P NEW MOD 45 MIN: CPT | Performed by: NEUROLOGICAL SURGERY

## 2023-10-24 PROCEDURE — 3078F DIAST BP <80 MM HG: CPT | Performed by: NEUROLOGICAL SURGERY

## 2023-10-24 PROCEDURE — 1160F RVW MEDS BY RX/DR IN RCRD: CPT | Performed by: NEUROLOGICAL SURGERY

## 2023-10-24 PROCEDURE — 3077F SYST BP >= 140 MM HG: CPT | Performed by: NEUROLOGICAL SURGERY

## 2023-10-25 ENCOUNTER — OFFICE VISIT (OUTPATIENT)
Dept: ORTHOPEDIC SURGERY | Facility: CLINIC | Age: 68
End: 2023-10-25
Payer: MEDICARE

## 2023-10-25 DIAGNOSIS — M70.61 TROCHANTERIC BURSITIS OF RIGHT HIP: Primary | ICD-10-CM

## 2023-10-25 DIAGNOSIS — Z96.651 HX OF TOTAL KNEE REPLACEMENT, RIGHT: ICD-10-CM

## 2023-10-25 RX ADMIN — LIDOCAINE HYDROCHLORIDE 2 ML: 10 INJECTION, SOLUTION EPIDURAL; INFILTRATION; INTRACAUDAL; PERINEURAL at 10:32

## 2023-10-25 RX ADMIN — TRIAMCINOLONE ACETONIDE 80 MG: 40 INJECTION, SUSPENSION INTRA-ARTICULAR; INTRAMUSCULAR at 10:32

## 2023-10-25 NOTE — PROGRESS NOTES
INJECTION    Patient: Nahum Restrepo    YOB: 1955    MRN: [id number]    Chief Complaint   Patient presents with    Right Hip - Follow-up    Right Knee - Follow-up       History of Present Illness: Patient returns for follow-up on hip pain. The pain is over the lateral aspect of the right hip at the greater trochanteric bursa.  His pain has been progressive in nature and remains intermittent .   His pain is worsened  going up and down stairs. There has been improvement in the past with injections.    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     Allergies: No Known Allergies    Medications:   Home Medications:  Current Outpatient Medications on File Prior to Visit   Medication Sig    alfuzosin (UROXATRAL) 10 MG 24 hr tablet Take 1 tablet by mouth Daily.    ALPRAZolam (XANAX) 0.5 MG tablet TAKE 1 TABLET BY MOUTH TWICE DAILY AS NEEDED    amLODIPine (NORVASC) 5 MG tablet TAKE 1 TABLET BY MOUTH DAILY    gabapentin (NEURONTIN) 100 MG capsule Take 1 capsule by mouth Every 12 (Twelve) Hours.    meloxicam (MOBIC) 7.5 MG tablet 1 Oral Daily with food.    metoprolol succinate XL (TOPROL-XL) 50 MG 24 hr tablet TAKE 1 TABLET BY MOUTH DAILY    multivitamin with minerals tablet tablet Take 1 tablet by mouth Daily.     No current facility-administered medications on file prior to visit.     Current Medications:  Scheduled Meds:  PRN Meds:.    I have reviewed the patient's medical history in detail and updated the computerized patient record.  Review and summarization of old records include:    Past Medical History:   Diagnosis Date    Hypertension     Mild anxiety     Primary osteoarthritis of right knee 05/18/2021    S/P right knee arthroscopy 07/07/2021     Past Surgical History:   Procedure Laterality Date    KNEE ARTHROSCOPY Right 06/16/2021    Procedure: KNEE ARTHROSCOPY with chondroplasty with medial meniscectomy;  Surgeon: Km Felix MD;  Location: [facility] MA[facility] OR;  Service: Orthopedics;  Laterality:  "Right;    REPLACEMENT TOTAL KNEE Right 2022    SHOULDER SURGERY Left     TOTAL KNEE ARTHROPLASTY Right 06/21/2022    Procedure: TOTAL KNEE ARTHROPLASTY WITH MINGO ROBOT;  Surgeon: Km Felix MD;  Location: [facility] MA[facility] OR;  Service: Robotics - Ortho;  Laterality: Right;     Social History     Occupational History    Not on file   Tobacco Use    Smoking status: Never     Passive exposure: Never    Smokeless tobacco: Never   Vaping Use    Vaping Use: Never used   Substance and Sexual Activity    Alcohol use: Yes     Comment: occaional    Drug use: Yes     Frequency: 4.0 times per week     Types: Marijuana    Sexual activity: Not Currently      Social History     Social History Narrative    Not on file     Family History   Problem Relation Age of Onset    Arthritis Mother     Osteoarthritis Mother     Other Mother         Immobilized    Sleep apnea Sister     Diabetes Sister        Review of Systems        Wt Readings from Last 3 Encounters:   10/24/23 94.5 kg (208 lb 6.4 oz)   10/02/23 91.9 kg (202 lb 9.6 oz)   09/25/23 89.8 kg (198 lb)     Ht Readings from Last 3 Encounters:   10/24/23 182.9 cm (72.01\")   10/02/23 182.9 cm (72.01\")   09/25/23 182.9 cm (72\")     There is no height or weight on file to calculate BMI.  No height and weight on file for this encounter.  There were no vitals filed for this visit.    Physical Exam    Musculoskeletal:    Right hip (gtb): Skin and soft tissues over the greater trochanteric bursa are tender upon palpation, along with IT band tenderness. Cross body adduction is negative. Internal and external rotations are bothersome and cause tenderness over the greater trochanter. There is no clinical deformity and no shortening. He does not have a limp upon walking. There is not piriformis tightness and there  is not capsular tightness with any rotation. Dorsalis pedis and posterior tibial artery pulses are palpable. Neurovascular status is intact and capillary refill is less than 2 " seconds. Common peroneal nerve function is well preserved.      Diagnostics:      Procedure:  Large Joint Arthrocentesis: R greater trochanteric bursa  Date/Time: 10/25/2023 10:32 AM  Consent given by: patient  Site marked: site marked  Timeout: Immediately prior to procedure a time out was called to verify the correct patient, procedure, equipment, support staff and site/side marked as required   Supporting Documentation  Indications: pain   Procedure Details  Location: hip - R greater trochanteric bursa  Preparation: Patient was prepped and draped in the usual sterile fashion  Needle size: 25 G  Approach: anteromedial  Medications administered: 2 mL lidocaine PF 1% 1 %; 80 mg triamcinolone acetonide 40 MG/ML  Patient tolerance: patient tolerated the procedure well with no immediate complications            Assessment:   Diagnoses and all orders for this visit:    1. Trochanteric bursitis of right hip (Primary)  -     Large Joint Arthrocentesis: R greater trochanteric bursa    2. Hx of total knee replacement, right          Plan:   Injected patient's right hip-greater trochanteric bursa joint(s)with Kenalog from an anterolateral approach   Compression/brace   Rest, ice, compression, and elevation (RICE) therapy  OTC Alternate Ibuprofen and Tylenol as needed  Follow up in 3 month(s)    Date of encounter: 10/25/2023   Km Felix MD

## 2023-10-26 ENCOUNTER — HOSPITAL ENCOUNTER (OUTPATIENT)
Dept: GENERAL RADIOLOGY | Facility: HOSPITAL | Age: 68
Discharge: HOME | End: 2023-10-26
Admitting: NEUROLOGICAL SURGERY
Payer: MEDICARE

## 2023-10-26 DIAGNOSIS — M47.819 ARTHRITIS OF LOW BACK: ICD-10-CM

## 2023-10-26 DIAGNOSIS — M54.40 MIDLINE LOW BACK PAIN WITH SCIATICA, SCIATICA LATERALITY UNSPECIFIED, UNSPECIFIED CHRONICITY: Primary | ICD-10-CM

## 2023-10-26 PROCEDURE — 72114 X-RAY EXAM L-S SPINE BENDING: CPT

## 2023-10-26 NOTE — TELEPHONE ENCOUNTER
Rx Refill Note  Requested Prescriptions     Pending Prescriptions Disp Refills    Ibuprofen 3 %, Gabapentin 10 %, Baclofen 2 %, lidocaine 4 % 90 g 5     Sig: Apply 1-2 g topically to the appropriate area as directed 3 (Three) to 4 (Four) times daily.      Last office visit with prescribing clinician: 10/24/2023   Last telemedicine visit with prescribing clinician: Visit date not found   Next office visit with prescribing clinician: 1/23/2024                         Would you like a call back once the refill request has been completed: [] Yes [] No    If the office needs to give you a call back, can they leave a voicemail: [] Yes [] No    Chandlyer Behr, MA  10/26/23, 11:04 EDT

## 2023-10-26 NOTE — TELEPHONE ENCOUNTER
PATIENT CALLED IN AND STATED THAT [facility] RECEIVED THE PRESCRIPTION FOR THE     Ibuprofen 3 %, Gabapentin 10 %, Baclofen 2 %, lidocaine 4 %   BUT THEY STATED IT IS A COMPOUND RX- AND THEY DO NOT FILL THOSE THERE- PATIENT IS INQUIRING WHERE HE CAN HAVE IT FILLED OR WHAT HE CAN DO TO GET IT FILLED -     C/B- [phone]    ROUTING HIGH PRIORITY DUE TO MEDICATION REFILL.     PLEASE CALL TO ADVISE- THANK YOU.

## 2023-10-27 NOTE — TELEPHONE ENCOUNTER
PATIENT CALLED IN REGARDING THIS REFILL- CALLED TO PRACTICE- SPOKE WITH YEIMI-     ADVISED WE ARE WAITING FOR DR GONSALVES RESPONSE- HE WILL NOT BE IN THE OFFICE TILL THIS AFTERNOON- CALLED IN TO SX    PATIENT REQUESTING C/B WHEN MEDICATION SENT- THANK YOU.    C/B- [phone] (home)

## 2023-10-31 ENCOUNTER — TELEPHONE (OUTPATIENT)
Dept: NEUROSURGERY | Facility: CLINIC | Age: 68
End: 2023-10-31
Payer: MEDICARE

## 2023-10-31 NOTE — TELEPHONE ENCOUNTER
Patient called the office and asked for an update. I let him know that I have faxed the order over to [facility]. Patient is going to call them in an hour to see if they received it.

## 2023-10-31 NOTE — TELEPHONE ENCOUNTER
Patient called the office stating that [facility] has not received his prescription. I let him know that I will call them and let him know. He verbalized understanding.

## 2023-11-05 RX ORDER — VALSARTAN AND HYDROCHLOROTHIAZIDE 320; 25 MG/1; MG/1
TABLET, FILM COATED ORAL
Qty: 90 TABLET | Refills: 1 | Status: SHIPPED | OUTPATIENT
Start: 2023-11-05

## 2023-11-07 RX ORDER — TRIAMCINOLONE ACETONIDE 40 MG/ML
80 INJECTION, SUSPENSION INTRA-ARTICULAR; INTRAMUSCULAR
Status: COMPLETED | OUTPATIENT
Start: 2023-10-25 | End: 2023-10-25

## 2023-11-07 RX ORDER — LIDOCAINE HYDROCHLORIDE 10 MG/ML
2 INJECTION, SOLUTION EPIDURAL; INFILTRATION; INTRACAUDAL; PERINEURAL
Status: COMPLETED | OUTPATIENT
Start: 2023-10-25 | End: 2023-10-25

## 2023-12-01 ENCOUNTER — OFFICE VISIT (OUTPATIENT)
Dept: FAMILY MEDICINE CLINIC | Facility: CLINIC | Age: 68
End: 2023-12-01
Payer: MEDICARE

## 2023-12-01 VITALS
WEIGHT: 210 LBS | HEART RATE: 75 BPM | OXYGEN SATURATION: 97 % | BODY MASS INDEX: 28.44 KG/M2 | RESPIRATION RATE: 18 BRPM | SYSTOLIC BLOOD PRESSURE: 122 MMHG | HEIGHT: 72 IN | DIASTOLIC BLOOD PRESSURE: 78 MMHG

## 2023-12-01 DIAGNOSIS — N40.0 BENIGN PROSTATIC HYPERPLASIA WITHOUT URINARY OBSTRUCTION: ICD-10-CM

## 2023-12-01 DIAGNOSIS — I10 PRIMARY HYPERTENSION: ICD-10-CM

## 2023-12-01 DIAGNOSIS — R97.20 ELEVATED PSA: ICD-10-CM

## 2023-12-01 DIAGNOSIS — Z12.11 ENCOUNTER FOR SCREENING FOR MALIGNANT NEOPLASM OF COLON: Primary | ICD-10-CM

## 2023-12-01 PROBLEM — M96.840 POSTOPERATIVE HEMATOMA OF MUSCULOSKELETAL STRUCTURE FOLLOWING MUSCULOSKELETAL PROCEDURE: Status: RESOLVED | Noted: 2022-08-18 | Resolved: 2023-12-01

## 2023-12-01 NOTE — PROGRESS NOTES
"Chief Complaint  Back Pain    Subjective        Nahum Restrepo presents to [facility]  History of Present Illness    Objective   Vital Signs:  /78   Pulse 75   Resp 18   Ht 182.9 cm (72.01\")   Wt 95.3 kg (210 lb)   SpO2 97%   BMI 28.47 kg/m²   Estimated body mass index is 28.47 kg/m² as calculated from the following:    Height as of this encounter: 182.9 cm (72.01\").    Weight as of this encounter: 95.3 kg (210 lb).               Physical Exam   Result Review :                   Assessment and Plan   Diagnoses and all orders for this visit:    1. Encounter for screening for malignant neoplasm of colon (Primary)    2. Benign prostatic hyperplasia without urinary obstruction    3. Elevated PSA    4. Primary hypertension             Follow Up   Return in about 6 months (around 6/1/2024), or if symptoms worsen or fail to improve, for Medicare Wellness.  Patient was given instructions and counseling regarding his condition or for health maintenance advice. Please see specific information pulled into the AVS if appropriate.         "

## 2023-12-01 NOTE — PROGRESS NOTES
"Chief Complaint  Back Pain    Subjective        Nahum Restrepo presents to [facility]  History of Present Illness  The patient presents today for a follow-up.    The patient was experiencing severe back pain approximately 2 months ago. He was seen by Dr. Alex Andrade who ordered an MRI done and referred him to Dr. Aguilar. He was prescribed a topical cream that has worked well.    The patient would like to get a CT scan of his head. He feels like his brain is \"short circuiting\" in the mornings. He has been on a CPAP for 6 weeks and he feels more rested. His nose is not congested. He is having some mild headaches.    The patient sees his urologist every 6 months. He has an appointment 12/2023. He had a biopsy done 4 years ago as well as an MRI done on it. The biopsy and MRI were normal. His urine stream is not slow. He gets up a couple of times in the night to urinate, but he feels like he empties his bladder well.    The patient's blood pressure has been normal. He checks his blood pressure quite often and it was good this morning.    The patient's right knee and right shoulder are doing well.    The patient has not had a colonoscopy in 13 years.     Objective   Vital Signs:  /78   Pulse 75   Resp 18   Ht 182.9 cm (72.01\")   Wt 95.3 kg (210 lb)   SpO2 97%   BMI 28.47 kg/m²   Estimated body mass index is 28.47 kg/m² as calculated from the following:    Height as of this encounter: 182.9 cm (72.01\").    Weight as of this encounter: 95.3 kg (210 lb).               Physical Exam  Constitutional:       Appearance: Normal appearance. He is well-developed and normal weight.   HENT:      Head: Normocephalic and atraumatic.      Right Ear: Tympanic membrane, ear canal and external ear normal.      Left Ear: Tympanic membrane, ear canal and external ear normal.      Nose: Nose normal.      Mouth/Throat:      Mouth: Mucous membranes are moist.      Pharynx: Oropharynx is clear. " No oropharyngeal exudate.   Eyes:      Extraocular Movements: Extraocular movements intact.      Conjunctiva/sclera: Conjunctivae normal.      Pupils: Pupils are equal, round, and reactive to light.   Cardiovascular:      Rate and Rhythm: Normal rate and regular rhythm.      Pulses: Normal pulses.      Heart sounds: Normal heart sounds.   Pulmonary:      Effort: Pulmonary effort is normal.      Breath sounds: Normal breath sounds.   Abdominal:      General: Bowel sounds are normal.      Palpations: Abdomen is soft.   Musculoskeletal:         General: Normal range of motion.      Cervical back: Normal range of motion and neck supple.   Skin:     General: Skin is warm and dry.   Neurological:      General: No focal deficit present.      Mental Status: He is alert and oriented to person, place, and time. Mental status is at baseline.   Psychiatric:         Mood and Affect: Mood normal.         Behavior: Behavior normal.         Thought Content: Thought content normal.         Judgment: Judgment normal.        Result Review :        Lab results from 5 months ago were reviewed.  His PSA was elevated. His total cholesterol was normal. His LDL was slightly elevated. His triglycerides were slightly elevated.           Assessment and Plan   1. Encounter for screening malignant neoplasia of the colon  - Nahum Nagel has not had a colonoscopy for about 10 to 15 years. We are going to go ahead and set that up for him now with gastroenterology of [facility] and we are going to get him in to see the gastroenterologist soon and get that taken care of.    2. Benign prostate hypertrophy with minimal obstruction  - The patient's PSA was 10 last year and we got him to a urologist. He has had CT scans, MRI, and prostate biopsy and there is no evidence of any cancer. Right now, they are just following the PSA as time goes on and because he has minimal obstructive symptoms, he is not taking anything yet. The PSA seems to be  fluctuating with time, so it is probably due to benign prostate hypertrophy. He will continue to be followed for his elevated PSA by urology.    3. Hypertension  - The patient's blood pressure was excellent today. He will continue his current medications, which is 3 and controlling his blood pressure very well.    - We will see Nahum Nagel back in about 6 months when he comes in for his yearly physical.         Follow Up   Return in about 6 months (around 6/1/2024), or if symptoms worsen or fail to improve, for Medicare Wellness.  Patient was given instructions and counseling regarding his condition or for health maintenance advice. Please see specific information pulled into the AVS if appropriate.       Transcribed from ambient dictation for Leonardo Lujan MD by Felipe Barry.  12/01/23   11:38 EST    Patient or patient representative verbalized consent to the visit recording.  I have personally performed the services described in this document as transcribed by the above individual, and it is both accurate and complete.  Leonardo Lujan MD  12/3/2023  16:37 EST

## 2023-12-05 RX ORDER — METOPROLOL SUCCINATE 50 MG/1
50 TABLET, EXTENDED RELEASE ORAL DAILY
Qty: 90 TABLET | Refills: 3 | Status: SHIPPED | OUTPATIENT
Start: 2023-12-05

## 2024-01-09 RX ORDER — AMLODIPINE BESYLATE 5 MG/1
5 TABLET ORAL DAILY
Qty: 90 TABLET | Refills: 1 | Status: SHIPPED | OUTPATIENT
Start: 2024-01-09

## 2024-01-11 ENCOUNTER — OFFICE VISIT (OUTPATIENT)
Dept: SLEEP MEDICINE | Facility: CLINIC | Age: 69
End: 2024-01-11
Payer: MEDICARE

## 2024-01-11 VITALS
SYSTOLIC BLOOD PRESSURE: 116 MMHG | HEART RATE: 74 BPM | WEIGHT: 200 LBS | HEIGHT: 72 IN | RESPIRATION RATE: 12 BRPM | DIASTOLIC BLOOD PRESSURE: 76 MMHG | BODY MASS INDEX: 27.09 KG/M2 | OXYGEN SATURATION: 95 %

## 2024-01-11 DIAGNOSIS — E66.3 OVERWEIGHT (BMI 25.0-29.9): ICD-10-CM

## 2024-01-11 DIAGNOSIS — G47.33 OSA (OBSTRUCTIVE SLEEP APNEA): Primary | ICD-10-CM

## 2024-01-11 PROCEDURE — G0463 HOSPITAL OUTPT CLINIC VISIT: HCPCS

## 2024-01-11 NOTE — PROGRESS NOTES
"  [address] [address]  Phone   Fax         SLEEP CLINIC FOLLOW-UP PROGRESS NOTE    Nahum Restrepo  [id number]   1955  68 y.o.  male      PCP: Leonardo Lujan MD    DATE OF VISIT: 1/11/2024          CHIEF COMPLAINT: Obstructive sleep apnea    HPI:  This is a 68 y.o. year old patient who presents to the clinic today for the management of obstructive sleep apnea.  Patient had a(n) home sleep study 9/2023 showing obstructive sleep apnea with AHI of 11/hr.  This patient is using positive airway pressure therapy with auto CPAP. Patient's sleep apnea has improved with this therapy.  Patient usually goes to bed around 10 PM and wakes up around 7 AM .  This patient wakes up 2 time(s) during the night to use the restroom.  No significant daytime fatigue.  Good usage of device and good control of sleep apnea with residual AHI 1/h.  He reports he was not sure he really needed a CPAP until he started using it and he was surprised on how much better he feels since he has been using it, really notices an improvement.      MEDICATIONS: reviewed     ALLERGIES:  Patient has no known allergies.    SOCIAL HISTORY (habits pertaining to sleep medicine):  History tobacco use: No   History of alcohol use: 2 per week  Caffeine use: 2     REVIEW OF SYSTEMS:   Pertinent positive symptoms are:  [place] Sleepiness Scale :Total score: 5   Dry mouth/nose--- discussed how to adjust humidifier as needed but to decrease if develops condensation in hose or mask.  Discussed possible chinstrap as well.        PHYSICAL EXAMINATION:  CONSTITUTIONAL:  Vitals:    01/11/24 1100   BP: 116/76   BP Location: Left arm   Patient Position: Sitting   Pulse: 74   Resp: 12   SpO2: 95%   Weight: 90.7 kg (200 lb)   Height: 182.9 cm (72\")    Body mass index is 27.12 kg/m².   HEAD: atraumatic, normocephalic  RESP SYSTEM: not in respiratory distress, breathing unlabored  CARDIOVASULAR: " normal rate, no edema noted   NEURO: Alert and oriented x 3, mood and affect appeared appropriate      DATA REVIEWED:  The Smart card downloaded on 1/7/2024 has been reviewed independently by me for compliance and discussed the data with the patient.   Compliance: 93%  More than 4 hr use: 90%  Average use of the device: 6 hours 50 minutes per night  Residual AHI: 1 /hr (goal < 5.0 /hr)  Mask type: Fullface  Device: ResMed AirSense 10  DME: [facility]            ASSESSMENT AND PLAN:  Obstructive Sleep Apnea (G 47.33): sleep apnea has improved with the device and treatment.  Patient has excellent compliance with the device for treatment of sleep apnea.  I have personally reviewed the smart card download and discussed the download data with the patient and encouarged continued use of the device.  The residual AHI is acceptable. The device is benefiting the patient and the device is medically necessary.  Recommend patient get supplies from the DME company, change them on a regular basis, and clean as directed.  A prescription for supplies has been sent to the [email or web address].  Also recommend using CPAP a minimum of 4 hours per night to meet insurance criteria, all night every night recommended for optimum health.  Recommend prioritizing sleep to aid in overall health.  Overweight: Body mass index is 27.12 kg/m².. Patients who are overweight or obese are at increased risk of sleep apnea/ sleep disordered breathing. Weight reduction and healthy lifestyle are encouraged in overweight/ obese patients as part of a comprehensive approach to sleep apnea treatment.    Hypertension      Patient will follow-up in 6 months or follow-up sooner for any issues or concerns.  Patient's questions were answered.          Thank you for allowing me to participate in the care of this patient.     Desi Pérez DNP, APRN  [facility]

## 2024-01-18 ENCOUNTER — TELEPHONE (OUTPATIENT)
Dept: ORTHOPEDICS | Facility: OTHER | Age: 69
End: 2024-01-18
Payer: MEDICARE

## 2024-01-18 DIAGNOSIS — M54.40 MIDLINE LOW BACK PAIN WITH SCIATICA, SCIATICA LATERALITY UNSPECIFIED, UNSPECIFIED CHRONICITY: ICD-10-CM

## 2024-01-18 NOTE — TELEPHONE ENCOUNTER
Called patient to ask if he had completed the Physical Therapy that Dr Rivera had ordered on 10/24/23. I did tell him that we will cancel his appointment on 1/23/24 and when he completes PT he can call to schedule a follow up with Dr Rivera. He understood. The patient also asked if he can get a refill on the compounded cream that Dr Rivera had prescribed for him from [facility]?  I told him that I will put in a request for that and that someone will follow up with him. He understood.

## 2024-01-19 ENCOUNTER — TELEPHONE (OUTPATIENT)
Dept: ORTHOPEDIC SURGERY | Facility: CLINIC | Age: 69
End: 2024-01-19

## 2024-01-19 ENCOUNTER — TELEPHONE (OUTPATIENT)
Dept: NEUROSURGERY | Facility: CLINIC | Age: 69
End: 2024-01-19

## 2024-01-19 DIAGNOSIS — M47.819 ARTHRITIS OF LOW BACK: ICD-10-CM

## 2024-01-19 DIAGNOSIS — M54.40 MIDLINE LOW BACK PAIN WITH SCIATICA, SCIATICA LATERALITY UNSPECIFIED, UNSPECIFIED CHRONICITY: Primary | ICD-10-CM

## 2024-01-19 NOTE — TELEPHONE ENCOUNTER
Rx Refill Note  Requested Prescriptions     Pending Prescriptions Disp Refills    Ibuprofen 3 %, Gabapentin 10 %, Baclofen 2 %, lidocaine 4 % 90 g 5     Sig: Apply 1-2 g topically to the appropriate area as directed 3 (Three) to 4 (Four) times daily.      Last office visit with prescribing clinician: 10/24/2023   Last telemedicine visit with prescribing clinician: Visit date not found   Next office visit with prescribing clinician: Visit date not found                         Would you like a call back once the refill request has been completed: [] Yes [] No    If the office needs to give you a call back, can they leave a voicemail: [] Yes [] No    Ct Lance MA  01/19/24, 08:41 EST

## 2024-01-19 NOTE — TELEPHONE ENCOUNTER
New PT order placed and I called the patient to let him know that scheduling will call him to get scheduled. I had to M to call the office back. HUB ok to relay the message if he calls back.

## 2024-01-19 NOTE — TELEPHONE ENCOUNTER
I called Harman and told him that I wasn't sure who called him but I do not believe it was [facility]. He voiced understanding.

## 2024-01-19 NOTE — TELEPHONE ENCOUNTER
PATIENT: TIFFANI Carey    PROVIDER: DR. GONSALVES    REASON: PT REFERRAL    FAX # [phone]    PATIENT TIFFANI ParadaAHAN CALLED ASKING THAT WE RESEND HIS PT REFERRAL TO [facility] AS THE ONE THEY HAVE HAS . PLEASE RESEND THAT PT REFERRAL.

## 2024-01-19 NOTE — TELEPHONE ENCOUNTER
"  Caller: Nahum Restrepo \"CLAIR\"    Relationship: Self    Best call back number:     Who are you requesting to speak with (clinical staff, provider,  specific staff member): PT IS NOT SURE WHO CALLED    What was the call regarding: PT IS NOT SURE WHAT THE CALL WAS REGARDING. HE STATED HE GOT A CALL FROM THE OFFICE    Is it okay if the provider responds through MyChart: CALL          "

## 2024-01-25 ENCOUNTER — OFFICE VISIT (OUTPATIENT)
Dept: ORTHOPEDIC SURGERY | Facility: CLINIC | Age: 69
End: 2024-01-25
Payer: MEDICARE

## 2024-01-25 VITALS — BODY MASS INDEX: 27.09 KG/M2 | OXYGEN SATURATION: 99 % | WEIGHT: 200 LBS | RESPIRATION RATE: 20 BRPM | HEIGHT: 72 IN

## 2024-01-25 DIAGNOSIS — M70.61 TROCHANTERIC BURSITIS OF RIGHT HIP: Primary | ICD-10-CM

## 2024-01-25 RX ORDER — LIDOCAINE HYDROCHLORIDE 10 MG/ML
2 INJECTION, SOLUTION INFILTRATION; PERINEURAL
Status: COMPLETED | OUTPATIENT
Start: 2024-01-25 | End: 2024-01-25

## 2024-01-25 RX ORDER — PREDNISONE 10 MG/1
TABLET ORAL
COMMUNITY

## 2024-01-25 RX ORDER — METHOCARBAMOL 500 MG/1
TABLET, FILM COATED ORAL
COMMUNITY

## 2024-01-25 RX ORDER — TRIAMCINOLONE ACETONIDE 40 MG/ML
80 INJECTION, SUSPENSION INTRA-ARTICULAR; INTRAMUSCULAR
Status: COMPLETED | OUTPATIENT
Start: 2024-01-25 | End: 2024-01-25

## 2024-01-25 RX ORDER — TRIAMCINOLONE ACETONIDE 1 MG/G
CREAM TOPICAL
COMMUNITY

## 2024-01-25 RX ORDER — KETOCONAZOLE 20 MG/G
CREAM TOPICAL
COMMUNITY

## 2024-01-25 RX ADMIN — TRIAMCINOLONE ACETONIDE 80 MG: 40 INJECTION, SUSPENSION INTRA-ARTICULAR; INTRAMUSCULAR at 13:55

## 2024-01-25 RX ADMIN — LIDOCAINE HYDROCHLORIDE 2 ML: 10 INJECTION, SOLUTION INFILTRATION; PERINEURAL at 13:55

## 2024-01-25 NOTE — PROGRESS NOTES
INJECTION VISIT    Patient: Nahum Restrepo    YOB: 1955    MRN: [id number]    Chief Complaint   Patient presents with    Right Hip - Follow-up, Pain     Pain   Wants injection     Right Knee - Follow-up, Pain     Surg- 6/21/22        History of Present Illness:   Nahum Restrepo is a 68 y.o. year old who presents today for injection of the right GTB. Receives injections Q3 months with good relief of bursitis symptoms. No changes since previous visit. Last injection 3 months ago.         Allergies: No Known Allergies    Medications:   Home Medications:  Current Outpatient Medications on File Prior to Visit   Medication Sig    alfuzosin (UROXATRAL) 10 MG 24 hr tablet Take 1 tablet by mouth Daily.    ALPRAZolam (XANAX) 0.5 MG tablet TAKE 1 TABLET BY MOUTH TWICE DAILY AS NEEDED    amLODIPine (NORVASC) 5 MG tablet TAKE 1 TABLET BY MOUTH DAILY    gabapentin (NEURONTIN) 100 MG capsule Take 1 capsule by mouth Every 12 (Twelve) Hours.    ketoconazole (NIZORAL) 2 % cream 30    meloxicam (MOBIC) 7.5 MG tablet 1 Oral Daily with food.    methocarbamol (ROBAXIN) 500 MG tablet 60    metoprolol succinate XL (TOPROL-XL) 50 MG 24 hr tablet TAKE 1 TABLET BY MOUTH DAILY    multivitamin with minerals tablet tablet Take 1 tablet by mouth Daily.    predniSONE (DELTASONE) 10 MG tablet 10    triamcinolone (KENALOG) 0.1 % cream 454    valsartan-hydrochlorothiazide (DIOVAN-HCT) 320-25 MG per tablet TAKE 1 TABLET BY MOUTH DAILY    [DISCONTINUED] Ibuprofen 3 %, Gabapentin 10 %, Baclofen 2 %, lidocaine 4 % Apply 1-2 g topically to the appropriate area as directed 3 (Three) to 4 (Four) times daily.     No current facility-administered medications on file prior to visit.         I have reviewed the patient's medical history in detail and updated the computerized patient record.  Review and summarization of old records include:    Past Medical History:   Diagnosis Date    Hypertension     Mild anxiety     Primary osteoarthritis of  right knee 05/18/2021    S/P right knee arthroscopy 07/07/2021     Past Surgical History:   Procedure Laterality Date    KNEE ARTHROSCOPY Right 06/16/2021    Procedure: KNEE ARTHROSCOPY with chondroplasty with medial meniscectomy;  Surgeon: Km Felix MD;  Location: [facility] MAIN OR;  Service: Orthopedics;  Laterality: Right;    REPLACEMENT TOTAL KNEE Right 2022    SHOULDER SURGERY Left     TOTAL KNEE ARTHROPLASTY Right 06/21/2022    Procedure: TOTAL KNEE ARTHROPLASTY WITH MINGO ROBOT;  Surgeon: Km Felix MD;  Location: [facility] MAIN OR;  Service: Robotics - Ortho;  Laterality: Right;     Social History     Occupational History    Not on file   Tobacco Use    Smoking status: Never     Passive exposure: Never    Smokeless tobacco: Never   Vaping Use    Vaping Use: Never used   Substance and Sexual Activity    Alcohol use: Yes     Comment: occaional    Drug use: Yes     Frequency: 4.0 times per week     Types: Marijuana    Sexual activity: Not Currently      Social History     Social History Narrative    Not on file     Family History   Problem Relation Age of Onset    Arthritis Mother     Osteoarthritis Mother     Other Mother         Immobilized    Sleep apnea Sister     Diabetes Sister                ROS  Negative unless listed in the HPI        Physical Exam  68 y.o. male  Body mass index is 27.12 kg/m²., 90.7 kg (200 lb)  Vitals:    01/25/24 1044   Resp: 20   SpO2: 99%     General: Alert, cooperative, appears well and in no observable distress.   HEENT: Normocephalic, atraumatic on external visual inspection. No icterus.   CV: No significant peripheral edema.   Respiratory: Normal respiratory effort.   Skin: Warm & well perfused; appropriate skin turgor.  Psych: Appropriate mood & affect.  Neuro: Gross sensation and motor intact in affected extremity/extremities.      Procedure:  Large Joint Arthrocentesis: R greater trochanteric bursa  Date/Time: 1/25/2024 1:55 PM  Consent given by: patient  Site marked:  site marked  Timeout: Immediately prior to procedure a time out was called to verify the correct patient, procedure, equipment, support staff and site/side marked as required   Supporting Documentation  Indications: pain   Procedure Details  Location: hip - R greater trochanteric bursa  Needle size: 25 G  Approach: lateral  Medications administered: 2 mL lidocaine 1 %; 80 mg triamcinolone acetonide 40 MG/ML  Patient tolerance: patient tolerated the procedure well with no immediate complications            Assessment:   Diagnoses and all orders for this visit:    1. Trochanteric bursitis of right hip (Primary)    Other orders  -     Large Joint Arthrocentesis      Body mass index is 27.12 kg/m².  BMI consistent with Overweight: 25.0-29.9kg/m2         Plan:   -     Risks and benefits of injection therapy discussed with patient.  Possibility of bruising, pain, swelling, infection, increased blood sugar levels, cortisol flare and soft tissue atrophy discussed in detail.  Injected patient's right hip-greater trochanteric bursa joint(s)with Kenalog from an lateral approach   Rest, ice, compression, and elevation (RICE) therapy  OTC Tylenol for pain PRN  BMI reviewed  Follow up in 3 months for repeat injection  Please call with questions or concerns in the interim        Date of encounter: 01/25/2024   FEI Alex

## 2024-01-26 DIAGNOSIS — M54.40 MIDLINE LOW BACK PAIN WITH SCIATICA, SCIATICA LATERALITY UNSPECIFIED, UNSPECIFIED CHRONICITY: Primary | ICD-10-CM

## 2024-01-26 DIAGNOSIS — M47.819 ARTHRITIS OF LOW BACK: ICD-10-CM

## 2024-02-16 ENCOUNTER — TREATMENT (OUTPATIENT)
Dept: PHYSICAL THERAPY | Facility: CLINIC | Age: 69
End: 2024-02-16
Payer: MEDICARE

## 2024-02-16 DIAGNOSIS — M54.40 MIDLINE LOW BACK PAIN WITH SCIATICA, SCIATICA LATERALITY UNSPECIFIED, UNSPECIFIED CHRONICITY: Primary | ICD-10-CM

## 2024-02-16 DIAGNOSIS — M47.819 ARTHRITIS OF LOW BACK: ICD-10-CM

## 2024-02-16 PROCEDURE — 97110 THERAPEUTIC EXERCISES: CPT | Performed by: PHYSICAL THERAPIST

## 2024-02-16 PROCEDURE — 97161 PT EVAL LOW COMPLEX 20 MIN: CPT | Performed by: PHYSICAL THERAPIST

## 2024-03-12 ENCOUNTER — OFFICE (OUTPATIENT)
Dept: URBAN - METROPOLITAN AREA PATHOLOGY 19 | Facility: PATHOLOGY | Age: 69
End: 2024-03-12
Payer: MEDICARE

## 2024-03-12 ENCOUNTER — OFFICE (OUTPATIENT)
Dept: URBAN - METROPOLITAN AREA PATHOLOGY 19 | Facility: PATHOLOGY | Age: 69
End: 2024-03-12
Payer: COMMERCIAL

## 2024-03-12 ENCOUNTER — ON CAMPUS - OUTPATIENT (OUTPATIENT)
Dept: URBAN - METROPOLITAN AREA HOSPITAL 2 | Facility: HOSPITAL | Age: 69
End: 2024-03-12
Payer: MEDICARE

## 2024-03-12 VITALS
RESPIRATION RATE: 18 BRPM | HEIGHT: 72 IN | SYSTOLIC BLOOD PRESSURE: 131 MMHG | DIASTOLIC BLOOD PRESSURE: 62 MMHG | OXYGEN SATURATION: 97 % | SYSTOLIC BLOOD PRESSURE: 99 MMHG | DIASTOLIC BLOOD PRESSURE: 84 MMHG | DIASTOLIC BLOOD PRESSURE: 70 MMHG | SYSTOLIC BLOOD PRESSURE: 108 MMHG | SYSTOLIC BLOOD PRESSURE: 89 MMHG | HEART RATE: 96 BPM | SYSTOLIC BLOOD PRESSURE: 118 MMHG | HEART RATE: 84 BPM | OXYGEN SATURATION: 96 % | SYSTOLIC BLOOD PRESSURE: 109 MMHG | DIASTOLIC BLOOD PRESSURE: 65 MMHG | RESPIRATION RATE: 16 BRPM | OXYGEN SATURATION: 92 % | RESPIRATION RATE: 14 BRPM | HEART RATE: 86 BPM | SYSTOLIC BLOOD PRESSURE: 107 MMHG | WEIGHT: 212.2 LBS | TEMPERATURE: 97 F | SYSTOLIC BLOOD PRESSURE: 141 MMHG | DIASTOLIC BLOOD PRESSURE: 72 MMHG | DIASTOLIC BLOOD PRESSURE: 88 MMHG | HEART RATE: 85 BPM | RESPIRATION RATE: 17 BRPM | HEART RATE: 82 BPM

## 2024-03-12 DIAGNOSIS — D12.2 BENIGN NEOPLASM OF ASCENDING COLON: ICD-10-CM

## 2024-03-12 DIAGNOSIS — K57.30 DIVERTICULOSIS OF LARGE INTESTINE WITHOUT PERFORATION OR ABS: ICD-10-CM

## 2024-03-12 DIAGNOSIS — D12.3 BENIGN NEOPLASM OF TRANSVERSE COLON: ICD-10-CM

## 2024-03-12 DIAGNOSIS — Z12.11 ENCOUNTER FOR SCREENING FOR MALIGNANT NEOPLASM OF COLON: ICD-10-CM

## 2024-03-12 PROBLEM — K63.5 POLYP OF COLON: Status: ACTIVE | Noted: 2024-03-12

## 2024-03-12 LAB
GI HISTOLOGY: A. ASCENDING COLON: (no result)
GI HISTOLOGY: B. TRANSVERSE COLON: (no result)
GI HISTOLOGY: PDF REPORT: (no result)

## 2024-03-12 PROCEDURE — 45385 COLONOSCOPY W/LESION REMOVAL: CPT | Mod: PT | Performed by: INTERNAL MEDICINE

## 2024-03-12 PROCEDURE — 88305 TISSUE EXAM BY PATHOLOGIST: CPT | Performed by: PATHOLOGY

## 2024-04-26 ENCOUNTER — OFFICE VISIT (OUTPATIENT)
Dept: ORTHOPEDIC SURGERY | Facility: CLINIC | Age: 69
End: 2024-04-26
Payer: MEDICARE

## 2024-04-26 VITALS — RESPIRATION RATE: 20 BRPM | OXYGEN SATURATION: 98 % | WEIGHT: 200 LBS | HEIGHT: 72 IN | BODY MASS INDEX: 27.09 KG/M2

## 2024-04-26 DIAGNOSIS — M70.61 TROCHANTERIC BURSITIS OF RIGHT HIP: Primary | ICD-10-CM

## 2024-04-26 RX ORDER — POLYETHYLENE GLYCOL 3350 17 G/17G
POWDER, FOR SOLUTION ORAL
COMMUNITY
Start: 2024-02-26

## 2024-04-26 RX ORDER — LIDOCAINE HYDROCHLORIDE 10 MG/ML
2 INJECTION, SOLUTION INFILTRATION; PERINEURAL
Status: COMPLETED | OUTPATIENT
Start: 2024-04-26 | End: 2024-04-26

## 2024-04-26 RX ORDER — TRIAMCINOLONE ACETONIDE 40 MG/ML
80 INJECTION, SUSPENSION INTRA-ARTICULAR; INTRAMUSCULAR
Status: COMPLETED | OUTPATIENT
Start: 2024-04-26 | End: 2024-04-26

## 2024-04-26 RX ADMIN — LIDOCAINE HYDROCHLORIDE 2 ML: 10 INJECTION, SOLUTION INFILTRATION; PERINEURAL at 11:14

## 2024-04-26 RX ADMIN — TRIAMCINOLONE ACETONIDE 80 MG: 40 INJECTION, SUSPENSION INTRA-ARTICULAR; INTRAMUSCULAR at 11:14

## 2024-04-26 NOTE — PROGRESS NOTES
INJECTION VISIT    Patient: Nahum Restrepo    YOB: 1955    MRN: [id number]    Chief Complaint   Patient presents with    Right Hip - Follow-up, Injections          Right Knee - Follow-up     SURG-6/21/2022  No pain        History of Present Illness:   Nahum Restrepo is a 68 y.o. year old who presents today for injection of right GTB. Last injection was 3 months ago and provided good relief.         Allergies: No Known Allergies    Medications:   Home Medications:  Current Outpatient Medications on File Prior to Visit   Medication Sig    alfuzosin (UROXATRAL) 10 MG 24 hr tablet Take 1 tablet by mouth Daily.    ALPRAZolam (XANAX) 0.5 MG tablet TAKE 1 TABLET BY MOUTH TWICE DAILY AS NEEDED    amLODIPine (NORVASC) 5 MG tablet TAKE 1 TABLET BY MOUTH DAILY    gabapentin (NEURONTIN) 100 MG capsule Take 1 capsule by mouth Every 12 (Twelve) Hours.    Ibuprofen 3 %, Gabapentin 10 %, Baclofen 2 %, lidocaine 4 % Apply 1-2 g topically to the appropriate area as directed 3 (Three) to 4 (Four) times daily.    ketoconazole (NIZORAL) 2 % cream 30    meloxicam (MOBIC) 7.5 MG tablet 1 Oral Daily with food.    methocarbamol (ROBAXIN) 500 MG tablet 60    metoprolol succinate XL (TOPROL-XL) 50 MG 24 hr tablet TAKE 1 TABLET BY MOUTH DAILY    metroNIDAZOLE (METROCREAM) 0.75 % cream     multivitamin with minerals tablet tablet Take 1 tablet by mouth Daily.    polyethylene glycol (MIRALAX) 17 GM/SCOOP powder TAKE AS DIRECTED FOR BOWEL PREP    predniSONE (DELTASONE) 10 MG tablet 10    triamcinolone (KENALOG) 0.1 % cream 454    valsartan-hydrochlorothiazide (DIOVAN-HCT) 320-25 MG per tablet TAKE 1 TABLET BY MOUTH DAILY     No current facility-administered medications on file prior to visit.         I have reviewed the patient's medical history in detail and updated the computerized patient record.  Review and summarization of old records include:    Past Medical History:   Diagnosis Date    Hypertension     Mild anxiety      Primary osteoarthritis of right knee 05/18/2021    S/P right knee arthroscopy 07/07/2021     Past Surgical History:   Procedure Laterality Date    KNEE ARTHROSCOPY Right 06/16/2021    Procedure: KNEE ARTHROSCOPY with chondroplasty with medial meniscectomy;  Surgeon: Km Felix MD;  Location: [facility] MAIN OR;  Service: Orthopedics;  Laterality: Right;    REPLACEMENT TOTAL KNEE Right 2022    SHOULDER SURGERY Left     TOTAL KNEE ARTHROPLASTY Right 06/21/2022    Procedure: TOTAL KNEE ARTHROPLASTY WITH MINGO ROBOT;  Surgeon: Km Felix MD;  Location: [facility] MAIN OR;  Service: Robotics - Ortho;  Laterality: Right;     Social History     Occupational History    Not on file   Tobacco Use    Smoking status: Never     Passive exposure: Never    Smokeless tobacco: Never   Vaping Use    Vaping status: Never Used   Substance and Sexual Activity    Alcohol use: Yes     Comment: occaional    Drug use: Yes     Frequency: 4.0 times per week     Types: Marijuana    Sexual activity: Not Currently      Social History     Social History Narrative    Not on file     Family History   Problem Relation Age of Onset    Arthritis Mother     Osteoarthritis Mother     Other Mother         Immobilized    Sleep apnea Sister     Diabetes Sister                ROS  Negative unless listed in the HPI        Physical Exam  68 y.o. male  Body mass index is 27.12 kg/m²., 90.7 kg (200 lb)  Vitals:    04/26/24 1057   Resp: 20   SpO2: 98%     General: Alert, cooperative, appears well and in no observable distress.   HEENT: Normocephalic, atraumatic on external visual inspection. No icterus.   CV: No significant peripheral edema.   Respiratory: Normal respiratory effort.   Skin: Warm & well perfused; appropriate skin turgor.  Psych: Appropriate mood & affect.  Neuro: Gross sensation and motor intact in affected extremity/extremities.  Vascular: Peripheral pulses palpable in affected extremity/extremities.         Procedure:  Large Joint  Arthrocentesis: R greater trochanteric bursa  Date/Time: 4/26/2024 11:14 AM  Consent given by: patient  Site marked: site marked  Timeout: Immediately prior to procedure a time out was called to verify the correct patient, procedure, equipment, support staff and site/side marked as required   Supporting Documentation  Indications: pain   Procedure Details  Location: hip - R greater trochanteric bursa  Needle size: 25 G  Approach: lateral  Medications administered: 2 mL lidocaine 1 %; 80 mg triamcinolone acetonide 40 MG/ML  Patient tolerance: patient tolerated the procedure well with no immediate complications            Assessment:   Diagnoses and all orders for this visit:    1. Trochanteric bursitis of right hip (Primary)    Other orders  -     Large Joint Arthrocentesis      Body mass index is 27.12 kg/m².  BMI consistent with Overweight: 25.0-29.9kg/m2         Plan:   -     Risks and benefits of injection therapy discussed with patient.  Possibility of bruising, pain, swelling, infection, increased blood sugar levels, cortisol flare and soft tissue atrophy discussed in detail.  Injected patient's right hip-greater trochanteric bursa joint(s)with Kenalog from an lateral approach   Compression/brace   Rest, ice, compression, and elevation (RICE) therapy  OTC Tylenol for pain PRN  BMI reviewed  Follow up in 3 months for repeat injection  Please call with questions or concerns in the interim        Date of encounter: 04/26/2024   FEI Alex

## 2024-05-15 ENCOUNTER — LAB (OUTPATIENT)
Dept: FAMILY MEDICINE CLINIC | Facility: CLINIC | Age: 69
End: 2024-05-15
Payer: MEDICARE

## 2024-05-15 DIAGNOSIS — E78.5 HYPERLIPIDEMIA, UNSPECIFIED HYPERLIPIDEMIA TYPE: ICD-10-CM

## 2024-05-15 DIAGNOSIS — Z13.29 THYROID DISORDER SCREEN: ICD-10-CM

## 2024-05-15 DIAGNOSIS — R73.09 ELEVATED GLUCOSE: ICD-10-CM

## 2024-05-15 DIAGNOSIS — I10 PRIMARY HYPERTENSION: ICD-10-CM

## 2024-05-15 DIAGNOSIS — E53.8 VITAMIN B12 DEFICIENCY: ICD-10-CM

## 2024-05-15 DIAGNOSIS — E11.9 TYPE 2 DIABETES MELLITUS WITHOUT COMPLICATION, UNSPECIFIED WHETHER LONG TERM INSULIN USE: ICD-10-CM

## 2024-05-15 DIAGNOSIS — E55.9 VITAMIN D DEFICIENCY: ICD-10-CM

## 2024-05-15 DIAGNOSIS — I10 HYPERTENSION, UNSPECIFIED TYPE: ICD-10-CM

## 2024-05-15 DIAGNOSIS — Z12.5 SCREENING FOR MALIGNANT NEOPLASM OF PROSTATE: Primary | ICD-10-CM

## 2024-05-15 DIAGNOSIS — Z11.59 NEED FOR HEPATITIS C SCREENING TEST: ICD-10-CM

## 2024-05-15 LAB
25(OH)D3 SERPL-MCNC: 30 NG/ML (ref 30–100)
ALBUMIN SERPL-MCNC: 4.3 G/DL (ref 3.5–5.2)
ALBUMIN/GLOB SERPL: 1.5 G/DL
ALP SERPL-CCNC: 67 U/L (ref 39–117)
ALT SERPL W P-5'-P-CCNC: 22 U/L (ref 1–41)
ANION GAP SERPL CALCULATED.3IONS-SCNC: 15.4 MMOL/L (ref 5–15)
AST SERPL-CCNC: 13 U/L (ref 1–40)
BASOPHILS # BLD AUTO: 0.05 10*3/MM3 (ref 0–0.2)
BASOPHILS NFR BLD AUTO: 0.7 % (ref 0–1.5)
BILIRUB SERPL-MCNC: 0.6 MG/DL (ref 0–1.2)
BUN SERPL-MCNC: 19 MG/DL (ref 8–23)
BUN/CREAT SERPL: 19.8 (ref 7–25)
CALCIUM SPEC-SCNC: 9.4 MG/DL (ref 8.6–10.5)
CHLORIDE SERPL-SCNC: 101 MMOL/L (ref 98–107)
CHOLEST SERPL-MCNC: 181 MG/DL (ref 0–200)
CO2 SERPL-SCNC: 20.6 MMOL/L (ref 22–29)
CREAT SERPL-MCNC: 0.96 MG/DL (ref 0.76–1.27)
DEPRECATED RDW RBC AUTO: 40.2 FL (ref 37–54)
EGFRCR SERPLBLD CKD-EPI 2021: 85.6 ML/MIN/1.73
EOSINOPHIL # BLD AUTO: 0.16 10*3/MM3 (ref 0–0.4)
EOSINOPHIL NFR BLD AUTO: 2.3 % (ref 0.3–6.2)
ERYTHROCYTE [DISTWIDTH] IN BLOOD BY AUTOMATED COUNT: 12.6 % (ref 12.3–15.4)
GLOBULIN UR ELPH-MCNC: 2.9 GM/DL
GLUCOSE SERPL-MCNC: 100 MG/DL (ref 65–99)
HBA1C MFR BLD: 5.5 % (ref 4.8–5.6)
HCT VFR BLD AUTO: 41.1 % (ref 37.5–51)
HCV AB SER QL: NORMAL
HDLC SERPL-MCNC: 38 MG/DL (ref 40–60)
HGB BLD-MCNC: 14 G/DL (ref 13–17.7)
IMM GRANULOCYTES # BLD AUTO: 0.08 10*3/MM3 (ref 0–0.05)
IMM GRANULOCYTES NFR BLD AUTO: 1.2 % (ref 0–0.5)
LDLC SERPL CALC-MCNC: 111 MG/DL (ref 0–100)
LDLC/HDLC SERPL: 2.81 {RATIO}
LYMPHOCYTES # BLD AUTO: 1.54 10*3/MM3 (ref 0.7–3.1)
LYMPHOCYTES NFR BLD AUTO: 22.4 % (ref 19.6–45.3)
MCH RBC QN AUTO: 29.8 PG (ref 26.6–33)
MCHC RBC AUTO-ENTMCNC: 34.1 G/DL (ref 31.5–35.7)
MCV RBC AUTO: 87.4 FL (ref 79–97)
MONOCYTES # BLD AUTO: 0.57 10*3/MM3 (ref 0.1–0.9)
MONOCYTES NFR BLD AUTO: 8.3 % (ref 5–12)
NEUTROPHILS NFR BLD AUTO: 4.49 10*3/MM3 (ref 1.7–7)
NEUTROPHILS NFR BLD AUTO: 65.1 % (ref 42.7–76)
NRBC BLD AUTO-RTO: 0 /100 WBC (ref 0–0.2)
PLATELET # BLD AUTO: 187 10*3/MM3 (ref 140–450)
PMV BLD AUTO: 10.1 FL (ref 6–12)
POTASSIUM SERPL-SCNC: 3.8 MMOL/L (ref 3.5–5.2)
PROT SERPL-MCNC: 7.2 G/DL (ref 6–8.5)
PSA SERPL-MCNC: 8.75 NG/ML (ref 0–4)
RBC # BLD AUTO: 4.7 10*6/MM3 (ref 4.14–5.8)
SODIUM SERPL-SCNC: 137 MMOL/L (ref 136–145)
TRIGL SERPL-MCNC: 182 MG/DL (ref 0–150)
TSH SERPL DL<=0.05 MIU/L-ACNC: 2.06 UIU/ML (ref 0.27–4.2)
VIT B12 BLD-MCNC: 709 PG/ML (ref 211–946)
VLDLC SERPL-MCNC: 32 MG/DL (ref 5–40)
WBC NRBC COR # BLD AUTO: 6.89 10*3/MM3 (ref 3.4–10.8)

## 2024-05-15 PROCEDURE — 83036 HEMOGLOBIN GLYCOSYLATED A1C: CPT | Performed by: FAMILY MEDICINE

## 2024-05-15 PROCEDURE — 36415 COLL VENOUS BLD VENIPUNCTURE: CPT

## 2024-05-15 PROCEDURE — 80053 COMPREHEN METABOLIC PANEL: CPT | Performed by: FAMILY MEDICINE

## 2024-05-15 PROCEDURE — 80061 LIPID PANEL: CPT | Performed by: FAMILY MEDICINE

## 2024-05-15 PROCEDURE — G0103 PSA SCREENING: HCPCS | Performed by: FAMILY MEDICINE

## 2024-05-15 PROCEDURE — 82306 VITAMIN D 25 HYDROXY: CPT | Performed by: FAMILY MEDICINE

## 2024-05-15 PROCEDURE — 82607 VITAMIN B-12: CPT | Performed by: FAMILY MEDICINE

## 2024-05-15 PROCEDURE — 84443 ASSAY THYROID STIM HORMONE: CPT | Performed by: FAMILY MEDICINE

## 2024-05-15 PROCEDURE — 86803 HEPATITIS C AB TEST: CPT | Performed by: FAMILY MEDICINE

## 2024-05-15 PROCEDURE — 85025 COMPLETE CBC W/AUTO DIFF WBC: CPT | Performed by: FAMILY MEDICINE

## 2024-05-22 ENCOUNTER — OFFICE VISIT (OUTPATIENT)
Dept: FAMILY MEDICINE CLINIC | Facility: CLINIC | Age: 69
End: 2024-05-22
Payer: MEDICARE

## 2024-05-22 VITALS
OXYGEN SATURATION: 94 % | DIASTOLIC BLOOD PRESSURE: 72 MMHG | WEIGHT: 211 LBS | HEIGHT: 72 IN | RESPIRATION RATE: 16 BRPM | BODY MASS INDEX: 28.58 KG/M2 | SYSTOLIC BLOOD PRESSURE: 126 MMHG | HEART RATE: 88 BPM

## 2024-05-22 DIAGNOSIS — Z96.651 HX OF TOTAL KNEE REPLACEMENT, RIGHT: ICD-10-CM

## 2024-05-22 DIAGNOSIS — Z00.00 MEDICARE ANNUAL WELLNESS VISIT, SUBSEQUENT: Primary | ICD-10-CM

## 2024-05-22 DIAGNOSIS — G60.9 IDIOPATHIC PERIPHERAL NEUROPATHY: ICD-10-CM

## 2024-05-22 DIAGNOSIS — R97.20 ELEVATED PSA: ICD-10-CM

## 2024-05-22 DIAGNOSIS — N40.0 BENIGN PROSTATIC HYPERPLASIA WITHOUT URINARY OBSTRUCTION: ICD-10-CM

## 2024-05-22 DIAGNOSIS — I10 PRIMARY HYPERTENSION: ICD-10-CM

## 2024-05-22 DIAGNOSIS — G47.33 OSA (OBSTRUCTIVE SLEEP APNEA): ICD-10-CM

## 2024-05-22 PROBLEM — R06.83 SNORING: Status: RESOLVED | Noted: 2021-02-17 | Resolved: 2024-05-22

## 2024-05-22 PROBLEM — S46.001A ROTATOR CUFF INJURY, RIGHT, INITIAL ENCOUNTER: Status: RESOLVED | Noted: 2020-03-23 | Resolved: 2024-05-22

## 2024-05-22 PROBLEM — Z12.11 SCREEN FOR COLON CANCER: Status: RESOLVED | Noted: 2021-12-22 | Resolved: 2024-05-22

## 2024-05-22 NOTE — PROGRESS NOTES
The ABCs of the Annual Wellness Visit  Subsequent Medicare Wellness Visit    Subjective    Nahum Restrepo is a 68 y.o. male who presents for a Subsequent Medicare Wellness Visit.    The following portions of the patient's history were reviewed and   updated as appropriate: allergies, current medications, past family history, past medical history, past social history, past surgical history, and problem list.    Compared to one year ago, the patient feels his physical   health is the same.    Compared to one year ago, the patient feels his mental   health is the same.    Recent Hospitalizations:  He was not admitted to the hospital during the last year.       Current Medical Providers:  Patient Care Team:  Leonardo Lujan MD as PCP - General  Km Felix MD as Surgeon (Orthopedic Surgery)    Outpatient Medications Prior to Visit   Medication Sig Dispense Refill    alfuzosin (UROXATRAL) 10 MG 24 hr tablet Take 1 tablet by mouth Daily.      ALPRAZolam (XANAX) 0.5 MG tablet TAKE 1 TABLET BY MOUTH TWICE DAILY AS NEEDED 60 tablet 1    amLODIPine (NORVASC) 5 MG tablet TAKE 1 TABLET BY MOUTH DAILY 90 tablet 1    gabapentin (NEURONTIN) 100 MG capsule Take 1 capsule by mouth Every 12 (Twelve) Hours.      Ibuprofen 3 %, Gabapentin 10 %, Baclofen 2 %, lidocaine 4 % Apply 1-2 g topically to the appropriate area as directed 3 (Three) to 4 (Four) times daily. 90 g 5    ketoconazole (NIZORAL) 2 % cream 30      metoprolol succinate XL (TOPROL-XL) 50 MG 24 hr tablet TAKE 1 TABLET BY MOUTH DAILY 90 tablet 3    multivitamin with minerals tablet tablet Take 1 tablet by mouth Daily.      valsartan-hydrochlorothiazide (DIOVAN-HCT) 320-25 MG per tablet TAKE 1 TABLET BY MOUTH DAILY 90 tablet 1    meloxicam (MOBIC) 7.5 MG tablet 1 Oral Daily with food. (Patient not taking: Reported on 5/22/2024) 30 tablet 3    methocarbamol (ROBAXIN) 500 MG tablet 60 (Patient not taking: Reported on 5/22/2024)      metroNIDAZOLE (METROCREAM) 0.75 %  "cream  (Patient not taking: Reported on 5/22/2024)      polyethylene glycol (MIRALAX) 17 GM/SCOOP powder TAKE AS DIRECTED FOR BOWEL PREP (Patient not taking: Reported on 5/22/2024)      predniSONE (DELTASONE) 10 MG tablet 10 (Patient not taking: Reported on 5/22/2024)      triamcinolone (KENALOG) 0.1 % cream 454 (Patient not taking: Reported on 5/22/2024)       No facility-administered medications prior to visit.       No opioid medication identified on active medication list. I have reviewed chart for other potential  high risk medication/s and harmful drug interactions in the elderly.        Aspirin is not on active medication list.  Aspirin use is not indicated based on review of current medical condition/s. Risk of harm outweighs potential benefits.  .    Patient Active Problem List   Diagnosis    Elevated PSA    Hypertension    Peripheral neuropathy    Overweight (BMI 25.0-29.9)    Medicare annual wellness visit, subsequent    Hx of total knee replacement, right    Trochanteric bursitis of right hip    MAYE (obstructive sleep apnea)     Advance Care Planning   Advance Care Planning     Advance Directive is not on file.  ACP discussion was held with the patient during this visit. Patient has an advance directive (not in EMR), copy requested.     Objective    Vitals:    05/22/24 1437   BP: 126/72   Pulse: 88   Resp: 16   SpO2: 94%   Weight: 95.7 kg (211 lb)   Height: 182.9 cm (72\")     Estimated body mass index is 28.62 kg/m² as calculated from the following:    Height as of this encounter: 182.9 cm (72\").    Weight as of this encounter: 95.7 kg (211 lb).           Does the patient have evidence of cognitive impairment? No    Lab Results   Component Value Date    TRIG 182 (H) 05/15/2024    HDL 38 (L) 05/15/2024     (H) 05/15/2024    VLDL 32 05/15/2024    HGBA1C 5.50 05/15/2024        HEALTH RISK ASSESSMENT    Smoking Status:  Social History     Tobacco Use   Smoking Status Never    Passive exposure: Never "   Smokeless Tobacco Never     Alcohol Consumption:  Social History     Substance and Sexual Activity   Alcohol Use Yes    Comment: occaional     Fall Risk Screen:    RICHARD Fall Risk Assessment was completed, and patient is at LOW risk for falls.Assessment completed on:2024    Depression Screenin/22/2024     2:36 PM   PHQ-2/PHQ-9 Depression Screening   Little Interest or Pleasure in Doing Things 0-->not at all   Feeling Down, Depressed or Hopeless 0-->not at all   PHQ-9: Brief Depression Severity Measure Score 0       Health Habits and Functional and Cognitive Screenin/22/2024     2:36 PM   Functional & Cognitive Status   Do you have difficulty preparing food and eating? No   Do you have difficulty bathing yourself, getting dressed or grooming yourself? No   Do you have difficulty using the toilet? No   Do you have difficulty moving around from place to place? No   Do you have trouble with steps or getting out of a bed or a chair? No   Current Diet Well Balanced Diet   Dental Exam Up to date   Eye Exam Not up to date   Exercise (times per week) 3 times per week   Current Exercises Include Walking   Do you need help using the phone?  No   Are you deaf or do you have serious difficulty hearing?  No   Do you need help to go to places out of walking distance? No   Do you need help shopping? No   Do you need help preparing meals?  No   Do you need help with housework?  No   Do you need help with laundry? No   Do you need help taking your medications? No   Do you need help managing money? No   Do you ever drive or ride in a car without wearing a seat belt? No   Have you felt unusual stress, anger or loneliness in the last month? No   Who do you live with? Alone   If you need help, do you have trouble finding someone available to you? No   Have you been bothered in the last four weeks by sexual problems? No   Do you have difficulty concentrating, remembering or making decisions? No        Age-appropriate Screening Schedule:  Refer to the list below for future screening recommendations based on patient's age, sex and/or medical conditions. Orders for these recommended tests are listed in the plan section. The patient has been provided with a written plan.    Health Maintenance   Topic Date Due    URINE MICROALBUMIN  Never done    DIABETIC EYE EXAM  Never done    TDAP/TD VACCINES (1 - Tdap) Never done    RSV Vaccine - Adults (1 - 1-dose 60+ series) Never done    DIABETIC FOOT EXAM  Never done    COVID-19 Vaccine (5 - 2023-24 season) 05/04/2024    ANNUAL WELLNESS VISIT  05/19/2024    INFLUENZA VACCINE  08/01/2024    HEMOGLOBIN A1C  11/15/2024    BMI FOLLOWUP  01/19/2025    COLORECTAL CANCER SCREENING  03/12/2034    HEPATITIS C SCREENING  Completed    Pneumococcal Vaccine 65+  Completed    ZOSTER VACCINE  Completed                  CMS Preventative Services Quick Reference  Risk Factors Identified During Encounter    The above risks/problems have been discussed with the patient.  Pertinent information has been shared with the patient in the After Visit Summary.  An After Visit Summary and PPPS were made available to the patient.    Follow Up:   Next Medicare Wellness visit to be scheduled in 1 year.       Additional E&M Note during same encounter follows:  Patient has multiple medical problems which are significant and separately identifiable that require additional work above and beyond the Medicare Wellness Visit.      Chief Complaint  Medicare Wellness-subsequent    Subjective        History of Present Illness  The patient is a 67-year-old male who presents for his yearly checkup.    The patient reports no current complaints. He maintains an active lifestyle, engaging in yard work. His shoulder condition is satisfactory. He experiences peripheral neuropathy, characterized by numbness and tingling in his feet, for which he takes gabapentin, which provides relief. He has previously undergone a  "right total knee replacement and receives injections in his right hip bursa every 3 months, which provide some relief. His most recent colonoscopy was in 03/2024, the results of which were reported as normal. He is not diabetic and regularly visits the ophthalmologist, dentist, and dermatologist. His hearing is satisfactory. He reports a non-painful protrusion on his abdomen that appeared approximately 4 to 5 months ago. His bowel movements are regular. He has experienced some weight gain. His urinary stream is occasionally slow, with nocturia occurring no more than 2 times per night. He consults with Dr. Wilton Obando, a urologist, every 6 months due to his fluctuating PSA levels. He has undergone a prostate biopsy and MRI, both of which returned negative results.    The patient uses a CPAP machine for sleep apnea, which he believes has improved his sleep quality. He no longer snores.   He is up-to-date on his influenza, shingles, and COVID-19 vaccines. He can not remember when his last tetanus vaccine was.         Objective   Vital Signs:  /72   Pulse 88   Resp 16   Ht 182.9 cm (72\")   Wt 95.7 kg (211 lb)   SpO2 94%   BMI 28.62 kg/m²     Physical Exam  Constitutional:       Appearance: Normal appearance. He is well-developed and normal weight.   HENT:      Head: Normocephalic and atraumatic.      Right Ear: Tympanic membrane, ear canal and external ear normal.      Left Ear: Tympanic membrane, ear canal and external ear normal.      Nose: Nose normal.      Mouth/Throat:      Mouth: Mucous membranes are moist.      Pharynx: Oropharynx is clear. No oropharyngeal exudate.   Eyes:      Extraocular Movements: Extraocular movements intact.      Conjunctiva/sclera: Conjunctivae normal.      Pupils: Pupils are equal, round, and reactive to light.   Cardiovascular:      Rate and Rhythm: Normal rate and regular rhythm.      Pulses: Normal pulses.      Heart sounds: Normal heart sounds.   Pulmonary:      Effort: " Pulmonary effort is normal.      Breath sounds: Normal breath sounds.   Abdominal:      General: Bowel sounds are normal.      Palpations: Abdomen is soft.   Musculoskeletal:         General: Normal range of motion.      Cervical back: Normal range of motion and neck supple.   Skin:     General: Skin is warm and dry.   Neurological:      General: No focal deficit present.      Mental Status: He is alert and oriented to person, place, and time. Mental status is at baseline.   Psychiatric:         Mood and Affect: Mood normal.         Behavior: Behavior normal.         Thought Content: Thought content normal.         Judgment: Judgment normal.                Results  Laboratory Studies  A1c is 5.5. Hepatitis C test is negative. B12 level is 7.9. Vitamin D level is 30. PSA is 8.7. Total cholesterol is 181. LDL is 111. HDL is 38.               Assessment & Plan      1. Subsequent Medicare annual wellness visit.  Upon arrival to the room the patient underwent the Medicare health risk assessment.  Neither the questions themselves or the answers that were given prompted any major concern on the part of the patient or by the medical staff that gave the assessment.  As far as the preventative care examinations and the preventative care immunizations that this patient requires they are as listed below.   Screening tests recommended:    Colonoscopy- utd  eye exam-will get this summer  foot exam-routinely seen  PSA-utd  Dentist- utd  Derm- utd  Immunization:  Influenza-utd  Prevnar-Prevnar 20 today  Pneumovax- Prev 20  Tetanus-needs to get at pharm  Shingles vaccine- utd  Hepatitis -utd  Covid - utd  RSV   - will get when older                2. Primary hypertension.  The patient is currently undergoing treatment for hypertension. His blood pressure was recorded as 126/72 during today's office visit. The current medication regimen will be continued.    3. Benign Benign Prostatic Hyperplasia (BPH).  The patient's  Prostate-Specific Antigen (PSA) levels have escalated to 8, with fluctuations in PSA levels. He is under the care of a urologist and has had biopsies and MRIs, all of which have returned negative results for malignancy. Despite occasional nocturia, the patient is not significantly symptomatic. Dr. Obando is closely monitoring his condition, and the patient will be informed of the next consultation. The current plan is to continue monitoring his PSA levels. Should the patient's symptoms become too obstructive, he is advised to contact us for potential medication adjustments.    4. Idiopathic peripheral neuropathy.  The patient experiences numbness and tingling in his feet. He takes gabapentin when his neuropathy is bothersome, but it is not severe enough to cause significant worry at this time. The gabapentin provides relief as needed.    5. Status post total knee replacement on the right.  The patient's surgery has been successful, and he is able to move his knee well.    6. Obstructive sleep apnea.  The patient uses a mask at night and wakes up refreshed from his sleep. The current therapy will be continued.    7. Elevated PSA.  The patient's PSA levels have been elevated, ranging from 7.5 to 8. He is under the care of a urologist.         Follow Up   Return in about 1 year (around 5/22/2025), or if symptoms worsen or fail to improve, for Medicare Wellness.  Patient was given instructions and counseling regarding his condition or for health maintenance advice. Please see specific information pulled into the AVS if appropriate.   Patient or patient representative verbalized consent for the use of Ambient Listening during the visit with  Leonardo Lujan MD for chart documentation. 5/22/2024  15:01 EDT

## 2024-06-03 RX ORDER — MELOXICAM 7.5 MG/1
7.5 TABLET ORAL DAILY
Qty: 30 TABLET | Refills: 2 | Status: SHIPPED | OUTPATIENT
Start: 2024-06-03 | End: 2024-09-01

## 2024-06-04 DIAGNOSIS — F41.9 ANXIETY: ICD-10-CM

## 2024-06-04 RX ORDER — ALPRAZOLAM 0.5 MG/1
TABLET ORAL
Qty: 60 TABLET | Refills: 3 | Status: SHIPPED | OUTPATIENT
Start: 2024-06-04

## 2024-07-10 NOTE — PROGRESS NOTES
"  [address] [address]  Phone   Fax         SLEEP CLINIC FOLLOW-UP PROGRESS NOTE    Nahum Restrepo  [id number]   1955  68 y.o.  male      PCP: Leonardo Lujan MD    DATE OF VISIT: 7/11/2024          CHIEF COMPLAINT: Obstructive sleep apnea    HPI:  This is a 68 y.o. year old patient who presents to the clinic today for the management of obstructive sleep apnea.  Patient had a(n) home sleep study in 9/2023 showing obstructive sleep apnea with AHI of 11/hr. patient had 12 minutes with O2 sats below 89% during the study.  This patient is using positive airway pressure therapy with auto CPAP.   Patient's sleep apnea has improved with this therapy with residual AHI 2.2/hr.   Average usage is 6 hours 39 minutes per night on nights used.   Patient tolerating device well and improved with treatment.  Does continue to have some mask leak and we discussed CPAP pillow as overall he feels mask has good initial seal but he moves around a lot at night.  He will reach out to DME if mask leak persist despite this.  Reiterated risks of untreated or poorly treated sleep apnea including increased cerebrovascular and cardiopulmonary risks, patient verbalized understanding.  Sleep apnea looks well treated with AHI at goal.            MEDICATIONS: reviewed     ALLERGIES:  Patient has no known allergies.    SOCIAL HISTORY (habits pertaining to sleep medicine):  Tobacco use: No   Alcohol use: 7-8 per week  Caffeine use: 7     REVIEW OF SYSTEMS:   Pertinent positive symptoms are:  [place] Sleepiness Scale :Total score: 2   Ear pain: Has been seen in urgent care        PHYSICAL EXAMINATION:  CONSTITUTIONAL:  Vitals:    07/11/24 1100   BP: 121/70   BP Location: Left arm   Patient Position: Sitting   Pulse: 84   SpO2: 96%   Weight: 90.7 kg (200 lb)   Height: 182.9 cm (72\")    Body mass index is 27.12 kg/m².   HEAD: atraumatic, normocephalic  RESP SYSTEM: not in " respiratory distress, breathing unlabored  CARDIOVASULAR: normal rate, no edema noted   NEURO: Alert and oriented x 3, mood and affect appeared appropriate      DATA REVIEWED:  The PAP compliance summary downloaded on 76/1/24 has been reviewed independently by me and discussed with the patient.   Compliance: 77%  More than 4 hr use: 73%  Average use of the device: 6 hours 39 minutes per night  Residual AHI: 2.2/hr (goal < 5.0 /hr)  Device: ResMed AirSense 10  Mask type: Fullface  DME: [facility]          ASSESSMENT AND PLAN:  Obstructive Sleep Apnea: sleep apnea has improved with the device and treatment.  Patient has excellent compliance with the device for treatment of sleep apnea.  I have personally reviewed the smart card download and discussed the download data with the patient and encouarged continued use of the device.  The residual AHI is acceptable. The device is benefiting the patient and the device is medically necessary.  Recommend patient get supplies from the DME company, change them on a regular basis, and clean as directed.  A prescription for supplies has been sent to the [employer] company.  Recommend continued usage of PAP device, most insurances require minimum usage of 4 hours per night at least 70% of the time, would recommend using all night every night if possible for optimum health.  Recommend prioritizing sleep to aid in overall health.  Do not drive or operate heavy machinery or do activities that require high concentration if feeling tired or drowsy.  Discussed CPAP pillow.  Patient to reach out to DME if continues to have issues with mask leak despite this.  Overweight: Body mass index is 27.12 kg/m².. Patients who are overweight or obese are at increased risk of sleep apnea/ sleep disordered breathing. Weight reduction and healthy lifestyle are encouraged in overweight/ obese patients as part of a comprehensive approach to sleep apnea treatment.        Patient will follow-up in 6  months, per patient preference, or follow-up sooner for any issues or concerns.  Patient's questions were answered.          Thank you for allowing me to participate in the care of this patient.     Desi Pérez DNP, APRBRENDAN  [facility]

## 2024-07-11 ENCOUNTER — OFFICE VISIT (OUTPATIENT)
Dept: SLEEP MEDICINE | Facility: CLINIC | Age: 69
End: 2024-07-11
Payer: MEDICARE

## 2024-07-11 VITALS
HEART RATE: 84 BPM | WEIGHT: 200 LBS | OXYGEN SATURATION: 96 % | BODY MASS INDEX: 27.09 KG/M2 | SYSTOLIC BLOOD PRESSURE: 121 MMHG | DIASTOLIC BLOOD PRESSURE: 70 MMHG | HEIGHT: 72 IN

## 2024-07-11 DIAGNOSIS — G47.33 OSA (OBSTRUCTIVE SLEEP APNEA): Primary | ICD-10-CM

## 2024-07-11 DIAGNOSIS — E66.3 OVERWEIGHT (BMI 25.0-29.9): ICD-10-CM

## 2024-07-11 PROCEDURE — G0463 HOSPITAL OUTPT CLINIC VISIT: HCPCS

## 2024-07-11 PROCEDURE — 3078F DIAST BP <80 MM HG: CPT | Performed by: NURSE PRACTITIONER

## 2024-07-11 PROCEDURE — 1160F RVW MEDS BY RX/DR IN RCRD: CPT | Performed by: NURSE PRACTITIONER

## 2024-07-11 PROCEDURE — 1159F MED LIST DOCD IN RCRD: CPT | Performed by: NURSE PRACTITIONER

## 2024-07-11 PROCEDURE — 3074F SYST BP LT 130 MM HG: CPT | Performed by: NURSE PRACTITIONER

## 2024-07-11 PROCEDURE — 99213 OFFICE O/P EST LOW 20 MIN: CPT | Performed by: NURSE PRACTITIONER

## 2024-07-16 ENCOUNTER — OFFICE VISIT (OUTPATIENT)
Dept: FAMILY MEDICINE CLINIC | Facility: CLINIC | Age: 69
End: 2024-07-16
Payer: MEDICARE

## 2024-07-16 VITALS
BODY MASS INDEX: 28.39 KG/M2 | DIASTOLIC BLOOD PRESSURE: 86 MMHG | OXYGEN SATURATION: 96 % | HEART RATE: 71 BPM | HEIGHT: 72 IN | RESPIRATION RATE: 16 BRPM | WEIGHT: 209.6 LBS | SYSTOLIC BLOOD PRESSURE: 130 MMHG

## 2024-07-16 DIAGNOSIS — R51.9 NEW ONSET OF HEADACHES AFTER AGE 50: Primary | ICD-10-CM

## 2024-07-16 DIAGNOSIS — M26.621 ARTHRALGIA OF RIGHT TEMPOROMANDIBULAR JOINT: ICD-10-CM

## 2024-07-16 PROCEDURE — 1125F AMNT PAIN NOTED PAIN PRSNT: CPT | Performed by: PHYSICIAN ASSISTANT

## 2024-07-16 PROCEDURE — 1159F MED LIST DOCD IN RCRD: CPT | Performed by: PHYSICIAN ASSISTANT

## 2024-07-16 PROCEDURE — 3044F HG A1C LEVEL LT 7.0%: CPT | Performed by: PHYSICIAN ASSISTANT

## 2024-07-16 PROCEDURE — 3079F DIAST BP 80-89 MM HG: CPT | Performed by: PHYSICIAN ASSISTANT

## 2024-07-16 PROCEDURE — 3075F SYST BP GE 130 - 139MM HG: CPT | Performed by: PHYSICIAN ASSISTANT

## 2024-07-16 PROCEDURE — 1160F RVW MEDS BY RX/DR IN RCRD: CPT | Performed by: PHYSICIAN ASSISTANT

## 2024-07-16 PROCEDURE — 99214 OFFICE O/P EST MOD 30 MIN: CPT | Performed by: PHYSICIAN ASSISTANT

## 2024-07-16 RX ORDER — CYCLOBENZAPRINE HCL 10 MG
10 TABLET ORAL 3 TIMES DAILY PRN
Qty: 30 TABLET | Refills: 0 | Status: SHIPPED | OUTPATIENT
Start: 2024-07-16

## 2024-07-16 NOTE — PROGRESS NOTES
"Subjective   Nahum Restrepo is a 68 y.o. male.     Chief Complaint   Patient presents with    Headache     States feels foggy and pain on R side x1 month       /86   Pulse 71   Resp 16   Ht 182.9 cm (72\")   Wt 95.1 kg (209 lb 9.6 oz)   SpO2 96%   BMI 28.43 kg/m²     BP Readings from Last 3 Encounters:   07/16/24 130/86   07/11/24 121/70   06/27/24 151/82       Wt Readings from Last 3 Encounters:   07/16/24 95.1 kg (209 lb 9.6 oz)   07/11/24 90.7 kg (200 lb)   06/27/24 90.7 kg (200 lb)       Headache   Presents to the clinic for headache that has been present on the right side of the head that has been present for the past 1 month. He was diagnosed with aom in the urgent care on 6/17/24 and was treated with amox/prednisone and felt better and then he went back to a different uc/er and was given referral to ent. He still has some ear pain now.     The following portions of the patient's history were reviewed and updated as appropriate: allergies, current medications, past family history, past medical history, past social history, past surgical history, and problem list.    Review of Systems    Objective   Physical Exam  Constitutional:       Appearance: Normal appearance.   Eyes:      Extraocular Movements: Extraocular movements intact.      Pupils: Pupils are equal, round, and reactive to light.   Musculoskeletal:         General: Tenderness (ttp over the temporalis and masseter muscle) present.   Neurological:      General: No focal deficit present.      Mental Status: He is alert and oriented to person, place, and time.   Psychiatric:         Mood and Affect: Mood normal.         Behavior: Behavior normal.           Diagnoses and all orders for this visit:    1. New onset of headaches after age 50 (Primary)  -     MRI Brain Without Contrast; Future    2. Arthralgia of right temporomandibular joint    Other orders  -     cyclobenzaprine (FLEXERIL) 10 MG tablet; Take 1 tablet by mouth 3 (Three) Times a " Day As Needed for Muscle Spasms.  Dispense: 30 tablet; Refill: 0        Return if symptoms worsen or fail to improve, for Recheck.

## 2024-07-30 ENCOUNTER — OFFICE VISIT (OUTPATIENT)
Dept: ORTHOPEDIC SURGERY | Facility: CLINIC | Age: 69
End: 2024-07-30
Payer: MEDICARE

## 2024-07-30 VITALS — BODY MASS INDEX: 28.31 KG/M2 | WEIGHT: 209 LBS | RESPIRATION RATE: 20 BRPM | OXYGEN SATURATION: 98 % | HEIGHT: 72 IN

## 2024-07-30 DIAGNOSIS — M70.61 TROCHANTERIC BURSITIS OF RIGHT HIP: ICD-10-CM

## 2024-07-30 DIAGNOSIS — M25.551 RIGHT HIP PAIN: Primary | ICD-10-CM

## 2024-07-30 PROCEDURE — 20610 DRAIN/INJ JOINT/BURSA W/O US: CPT | Performed by: NURSE PRACTITIONER

## 2024-07-30 RX ORDER — LIDOCAINE HYDROCHLORIDE 10 MG/ML
2 INJECTION, SOLUTION INFILTRATION; PERINEURAL
Status: COMPLETED | OUTPATIENT
Start: 2024-07-30 | End: 2024-07-30

## 2024-07-30 RX ORDER — TRIAMCINOLONE ACETONIDE 40 MG/ML
80 INJECTION, SUSPENSION INTRA-ARTICULAR; INTRAMUSCULAR
Status: COMPLETED | OUTPATIENT
Start: 2024-07-30 | End: 2024-07-30

## 2024-07-30 RX ADMIN — LIDOCAINE HYDROCHLORIDE 2 ML: 10 INJECTION, SOLUTION INFILTRATION; PERINEURAL at 13:18

## 2024-07-30 RX ADMIN — TRIAMCINOLONE ACETONIDE 80 MG: 40 INJECTION, SUSPENSION INTRA-ARTICULAR; INTRAMUSCULAR at 13:18

## 2024-07-30 NOTE — PROGRESS NOTES
INJECTION VISIT    Patient: Nahum Restrepo    YOB: 1955    MRN: [id number]    Chief Complaint   Patient presents with    Right Hip - Follow-up      would like injection        History of Present Illness:   Nahum Restrepo is a 68 y.o. year old who presents today for injection of the right GTB. Last injection was 3 months ago.         Allergies: No Known Allergies    Medications:   Home Medications:  Current Outpatient Medications on File Prior to Visit   Medication Sig    alfuzosin (UROXATRAL) 10 MG 24 hr tablet Take 1 tablet by mouth Daily.    ALPRAZolam (XANAX) 0.5 MG tablet TAKE 1 TABLET BY MOUTH TWICE DAILY AS NEEDED    amLODIPine (NORVASC) 5 MG tablet TAKE 1 TABLET BY MOUTH DAILY    cyclobenzaprine (FLEXERIL) 10 MG tablet Take 1 tablet by mouth 3 (Three) Times a Day As Needed for Muscle Spasms.    gabapentin (NEURONTIN) 100 MG capsule Take 1 capsule by mouth Every 12 (Twelve) Hours.    Ibuprofen 3 %, Gabapentin 10 %, Baclofen 2 %, lidocaine 4 % Apply 1-2 g topically to the appropriate area as directed 3 (Three) to 4 (Four) times daily.    ketoconazole (NIZORAL) 2 % cream 30    meloxicam (Mobic) 7.5 MG tablet Take 1 tablet by mouth Daily for 90 days.    metoprolol succinate XL (TOPROL-XL) 50 MG 24 hr tablet TAKE 1 TABLET BY MOUTH DAILY    multivitamin with minerals tablet tablet Take 1 tablet by mouth Daily.    valsartan-hydrochlorothiazide (DIOVAN-HCT) 320-25 MG per tablet TAKE 1 TABLET BY MOUTH DAILY     No current facility-administered medications on file prior to visit.         I have reviewed the patient's medical history in detail and updated the computerized patient record.  Review and summarization of old records include:    Past Medical History:   Diagnosis Date    Hypertension     Mild anxiety     Primary osteoarthritis of right knee 05/18/2021    S/P right knee arthroscopy 07/07/2021     Past Surgical History:   Procedure Laterality Date    KNEE ARTHROSCOPY Right 06/16/2021     Procedure: KNEE ARTHROSCOPY with chondroplasty with medial meniscectomy;  Surgeon: Km Felix MD;  Location: [facility] MAIN OR;  Service: Orthopedics;  Laterality: Right;    REPLACEMENT TOTAL KNEE Right 2022    SHOULDER SURGERY Left     TOTAL KNEE ARTHROPLASTY Right 06/21/2022    Procedure: TOTAL KNEE ARTHROPLASTY WITH MINGO ROBOT;  Surgeon: Km Felix MD;  Location: [facility] MAIN OR;  Service: Robotics - Ortho;  Laterality: Right;     Social History     Occupational History    Not on file   Tobacco Use    Smoking status: Never     Passive exposure: Past    Smokeless tobacco: Never   Vaping Use    Vaping status: Never Used   Substance and Sexual Activity    Alcohol use: Yes     Comment: occaional    Drug use: Yes     Frequency: 4.0 times per week     Types: Marijuana    Sexual activity: Not Currently      Social History     Social History Narrative    Not on file     Family History   Problem Relation Age of Onset    Arthritis Mother     Osteoarthritis Mother     Other Mother         Immobilized    Sleep apnea Sister     Diabetes Sister                ROS  Negative unless listed in the HPI        Physical Exam  68 y.o. male  Body mass index is 28.35 kg/m²., 94.8 kg (209 lb)  Vitals:    07/30/24 1257   Resp: 20   SpO2: 98%     General: Alert, cooperative, appears well and in no observable distress.   HEENT: Normocephalic, atraumatic on external visual inspection. No icterus.   CV: No significant peripheral edema.   Respiratory: Normal respiratory effort.   Skin: Warm & well perfused; appropriate skin turgor.  Psych: Appropriate mood & affect.  Neuro: Gross sensation and motor intact in affected extremity/extremities.  Vascular: Peripheral pulses palpable in affected extremity/extremities.         Procedure:  Large Joint Arthrocentesis: R greater trochanteric bursa  Date/Time: 7/30/2024 1:18 PM  Consent given by: patient  Site marked: site marked  Timeout: Immediately prior to procedure a time out was called to  verify the correct patient, procedure, equipment, support staff and site/side marked as required   Supporting Documentation  Indications: pain and diagnostic evaluation   Procedure Details  Location: hip - R greater trochanteric bursa  Needle size: 25 G  Approach: lateral  Medications administered: 2 mL lidocaine 1 %; 80 mg triamcinolone acetonide 40 MG/ML  Patient tolerance: patient tolerated the procedure well with no immediate complications            Assessment:   Diagnoses and all orders for this visit:    1. Right hip pain (Primary)  -     XR Hip With or Without Pelvis 2 - 3 View Right              Plan:   -     Risks and benefits of injection therapy discussed with patient.  Possibility of bruising, pain, swelling, infection, increased blood sugar levels, cortisol flare and soft tissue atrophy discussed in detail.  Injected patient's right hip-greater trochanteric bursa joint(s)with Kenalog from an lateral approach   Compression/brace   Rest, ice, compression, and elevation (RICE) therapy  OTC Tylenol for pain PRN  BMI reviewed  Follow up in 3 months with repeat injection   Please call with questions or concerns in the interim        Date of encounter: 07/30/2024   FEI Alex

## 2024-08-02 RX ORDER — VALSARTAN AND HYDROCHLOROTHIAZIDE 320; 25 MG/1; MG/1
TABLET, FILM COATED ORAL
Qty: 90 TABLET | Refills: 1 | Status: SHIPPED | OUTPATIENT
Start: 2024-08-02

## 2024-08-09 ENCOUNTER — HOSPITAL ENCOUNTER (OUTPATIENT)
Dept: MRI IMAGING | Facility: HOSPITAL | Age: 69
Discharge: HOME | End: 2024-08-09
Payer: MEDICARE

## 2024-08-09 DIAGNOSIS — R51.9 NEW ONSET OF HEADACHES AFTER AGE 50: ICD-10-CM

## 2024-08-09 PROCEDURE — 70551 MRI BRAIN STEM W/O DYE: CPT

## 2024-08-09 RX ORDER — AMOXICILLIN AND CLAVULANATE POTASSIUM 875; 125 MG/1; MG/1
1 TABLET, FILM COATED ORAL 2 TIMES DAILY
Qty: 20 TABLET | Refills: 0 | Status: SHIPPED | OUTPATIENT
Start: 2024-08-09 | End: 2024-08-19

## 2024-09-03 ENCOUNTER — OFFICE VISIT (OUTPATIENT)
Dept: FAMILY MEDICINE CLINIC | Facility: CLINIC | Age: 69
End: 2024-09-03
Payer: MEDICARE

## 2024-09-03 VITALS
DIASTOLIC BLOOD PRESSURE: 74 MMHG | SYSTOLIC BLOOD PRESSURE: 146 MMHG | WEIGHT: 205.8 LBS | OXYGEN SATURATION: 95 % | HEIGHT: 72 IN | RESPIRATION RATE: 18 BRPM | HEART RATE: 74 BPM | BODY MASS INDEX: 27.87 KG/M2

## 2024-09-03 DIAGNOSIS — R10.31 RIGHT INGUINAL PAIN: Primary | ICD-10-CM

## 2024-09-03 PROCEDURE — 99213 OFFICE O/P EST LOW 20 MIN: CPT | Performed by: STUDENT IN AN ORGANIZED HEALTH CARE EDUCATION/TRAINING PROGRAM

## 2024-09-03 PROCEDURE — 3078F DIAST BP <80 MM HG: CPT | Performed by: STUDENT IN AN ORGANIZED HEALTH CARE EDUCATION/TRAINING PROGRAM

## 2024-09-03 PROCEDURE — 3077F SYST BP >= 140 MM HG: CPT | Performed by: STUDENT IN AN ORGANIZED HEALTH CARE EDUCATION/TRAINING PROGRAM

## 2024-09-03 PROCEDURE — 1125F AMNT PAIN NOTED PAIN PRSNT: CPT | Performed by: STUDENT IN AN ORGANIZED HEALTH CARE EDUCATION/TRAINING PROGRAM

## 2024-09-03 PROCEDURE — 3044F HG A1C LEVEL LT 7.0%: CPT | Performed by: STUDENT IN AN ORGANIZED HEALTH CARE EDUCATION/TRAINING PROGRAM

## 2024-09-03 RX ORDER — MELOXICAM 7.5 MG/1
7.5 TABLET ORAL DAILY
Qty: 30 TABLET | Refills: 2 | Status: SHIPPED | OUTPATIENT
Start: 2024-09-03

## 2024-09-03 NOTE — PROGRESS NOTES
"Chief Complaint  Chief Complaint   Patient presents with    Groin Pain     Suspects hernia     Subjective        Nahum Restrepo is a 68 y.o. male who presents to [facility].    History of Present Illness  Here for acute visit.   Harman is having intermittent bulge in his R groin area.  If he is up on his feet a lot, walking, working, etc he will develop a bulge that is uncomfortable.  This has been going on for 2 months.  [place] and felt he was going to have to use a wheelchair b/c the pain was so bad.   When he lays down the bulge and the pain get better.  Today he has no symptoms and is doing well.  3 out of 4 days / wk he has symptoms.    No blood in BM's.      Objective   /74   Pulse 74   Resp 18   Ht 182.9 cm (72\")   Wt 93.4 kg (205 lb 12.8 oz)   SpO2 95%   BMI 27.91 kg/m²     Estimated body mass index is 27.91 kg/m² as calculated from the following:    Height as of this encounter: 182.9 cm (72\").    Weight as of this encounter: 93.4 kg (205 lb 12.8 oz).     Physical Exam   GEN: In no acute distress, non toxic appearing  ABD: Soft, NT, ND.  No palpable bulge on exam but R inguinal region he does have tenderness to palpation.       Result Review :              Assessment and Plan     Diagnoses and all orders for this visit:    1. Right inguinal pain (Primary)  His history sounds consistent w/ likely hernia that is easily reducible, though not palpable on exam today.  With up and moving around he can feel the hernia and discomfort, but then lays down and the hernia reduces which relieves the discomfort.    I would like for him to be evaluated by general surgery, may need a scan for further evaluation but would like for their expertise to make sure the correct scan is done, if needed.  Discussed warning signs to watch out for that may require emergency care: bulge that is not reducible, worsening pain, vomiting, bloody BM's.        Follow " Up   With general surgery

## 2024-09-30 RX ORDER — AMLODIPINE BESYLATE 5 MG/1
5 TABLET ORAL DAILY
Qty: 90 TABLET | Refills: 1 | Status: SHIPPED | OUTPATIENT
Start: 2024-09-30

## 2024-10-02 ENCOUNTER — OFFICE VISIT (OUTPATIENT)
Dept: SURGERY | Facility: CLINIC | Age: 69
End: 2024-10-02
Payer: MEDICARE

## 2024-10-02 ENCOUNTER — PREP FOR SURGERY (OUTPATIENT)
Dept: OTHER | Facility: HOSPITAL | Age: 69
End: 2024-10-02
Payer: MEDICARE

## 2024-10-02 VITALS
OXYGEN SATURATION: 96 % | WEIGHT: 205.9 LBS | SYSTOLIC BLOOD PRESSURE: 123 MMHG | RESPIRATION RATE: 18 BRPM | DIASTOLIC BLOOD PRESSURE: 75 MMHG | HEART RATE: 69 BPM | HEIGHT: 72 IN | BODY MASS INDEX: 27.89 KG/M2 | TEMPERATURE: 99.1 F

## 2024-10-02 DIAGNOSIS — K40.90 RIGHT INGUINAL HERNIA: Primary | ICD-10-CM

## 2024-10-02 RX ORDER — GABAPENTIN 100 MG/1
1 CAPSULE ORAL EVERY 12 HOURS SCHEDULED
COMMUNITY
Start: 2024-09-28

## 2024-10-12 NOTE — PROGRESS NOTES
"Chief Complaint  Hernia (NP Ref Dr. Cai, Rt Ing Hernia )    Subjective        Nahum Restrepo presents to [facility] GENERAL SURGERY  History of Present Illness    68-year-old gentleman with no significant past medical or surgical history who presents to discuss his right inguinal pain and bulge.  This been present for the past few months worse with physical activity.  Denies nausea vomiting or obstipation.  On exam he has reducible right inguinal hernia.  No left inguinal bulge.    Objective   Vital Signs:  /75 (BP Location: Left arm, Patient Position: Sitting, Cuff Size: Adult)   Pulse 69   Temp 99.1 °F (37.3 °C) (Infrared)   Resp 18   Ht 182.9 cm (72\")   Wt 93.4 kg (205 lb 14.4 oz)   SpO2 96%   BMI 27.93 kg/m²   Estimated body mass index is 27.93 kg/m² as calculated from the following:    Height as of this encounter: 182.9 cm (72\").    Weight as of this encounter: 93.4 kg (205 lb 14.4 oz).            Physical Exam  Constitutional:       General: He is not in acute distress.     Appearance: Normal appearance. He is not ill-appearing.   HENT:      Head: Normocephalic and atraumatic.      Right Ear: External ear normal.      Left Ear: External ear normal.   Eyes:      Extraocular Movements: Extraocular movements intact.      Conjunctiva/sclera: Conjunctivae normal.   Cardiovascular:      Rate and Rhythm: Normal rate and regular rhythm.   Pulmonary:      Effort: Pulmonary effort is normal. No respiratory distress.   Abdominal:      General: There is no distension.      Palpations: Abdomen is soft.      Tenderness: There is no abdominal tenderness.      Comments: Right inguinal bulge with Valsalva, no left inguinal bulge   Musculoskeletal:         General: No swelling or deformity.   Skin:     General: Skin is warm and dry.   Neurological:      Mental Status: He is alert and oriented to person, place, and time. Mental status is at baseline.        Result Review :              "   Assessment and Plan   Diagnoses and all orders for this visit:    1. Right inguinal hernia (Primary)      68-year-old gentleman with no significant past medical or surgical history who presents to discuss his right inguinal pain and bulge.  This been present for the past few months worse with physical activity.  Denies nausea vomiting or obstipation.  On exam he has reducible right inguinal hernia.  No left inguinal bulge.  I discussed risk benefits and alternatives to robotic right, possible bilateral, inguinal hernia repair, including bowel injury, mesh infection requiring mesh excision, recurrence, bleeding, injury to testicular vessels causing pain and possible need for orchiectomy, injury to vas deferens causing decrease in fertility rate, and chronic inguinal pain resulting from nerve injury and patient elected to proceed.           Follow Up   No follow-ups on file.  Patient was given instructions and counseling regarding his condition or for health maintenance advice. Please see specific information pulled into the AVS if appropriate.

## 2024-10-12 NOTE — H&P (VIEW-ONLY)
"Chief Complaint  Hernia (NP Ref Dr. Cai, Rt Ing Hernia )    Subjective        Nahum Restrepo presents to [facility] GENERAL SURGERY  History of Present Illness    68-year-old gentleman with no significant past medical or surgical history who presents to discuss his right inguinal pain and bulge.  This been present for the past few months worse with physical activity.  Denies nausea vomiting or obstipation.  On exam he has reducible right inguinal hernia.  No left inguinal bulge.    Objective   Vital Signs:  /75 (BP Location: Left arm, Patient Position: Sitting, Cuff Size: Adult)   Pulse 69   Temp 99.1 °F (37.3 °C) (Infrared)   Resp 18   Ht 182.9 cm (72\")   Wt 93.4 kg (205 lb 14.4 oz)   SpO2 96%   BMI 27.93 kg/m²   Estimated body mass index is 27.93 kg/m² as calculated from the following:    Height as of this encounter: 182.9 cm (72\").    Weight as of this encounter: 93.4 kg (205 lb 14.4 oz).            Physical Exam  Constitutional:       General: He is not in acute distress.     Appearance: Normal appearance. He is not ill-appearing.   HENT:      Head: Normocephalic and atraumatic.      Right Ear: External ear normal.      Left Ear: External ear normal.   Eyes:      Extraocular Movements: Extraocular movements intact.      Conjunctiva/sclera: Conjunctivae normal.   Cardiovascular:      Rate and Rhythm: Normal rate and regular rhythm.   Pulmonary:      Effort: Pulmonary effort is normal. No respiratory distress.   Abdominal:      General: There is no distension.      Palpations: Abdomen is soft.      Tenderness: There is no abdominal tenderness.      Comments: Right inguinal bulge with Valsalva, no left inguinal bulge   Musculoskeletal:         General: No swelling or deformity.   Skin:     General: Skin is warm and dry.   Neurological:      Mental Status: He is alert and oriented to person, place, and time. Mental status is at baseline.        Result Review :              "   Assessment and Plan   Diagnoses and all orders for this visit:    1. Right inguinal hernia (Primary)      68-year-old gentleman with no significant past medical or surgical history who presents to discuss his right inguinal pain and bulge.  This been present for the past few months worse with physical activity.  Denies nausea vomiting or obstipation.  On exam he has reducible right inguinal hernia.  No left inguinal bulge.  I discussed risk benefits and alternatives to robotic right, possible bilateral, inguinal hernia repair, including bowel injury, mesh infection requiring mesh excision, recurrence, bleeding, injury to testicular vessels causing pain and possible need for orchiectomy, injury to vas deferens causing decrease in fertility rate, and chronic inguinal pain resulting from nerve injury and patient elected to proceed.           Follow Up   No follow-ups on file.  Patient was given instructions and counseling regarding his condition or for health maintenance advice. Please see specific information pulled into the AVS if appropriate.

## 2024-10-14 ENCOUNTER — ANESTHESIA EVENT (OUTPATIENT)
Dept: PERIOP | Facility: HOSPITAL | Age: 69
End: 2024-10-14
Payer: MEDICARE

## 2024-10-14 NOTE — ANESTHESIA PREPROCEDURE EVALUATION
Anesthesia Evaluation     NPO Solid Status: > 8 hours  NPO Liquid Status: > 8 hours           Airway   Mallampati: I  TM distance: >3 FB  Neck ROM: full  No difficulty expected  Dental - normal exam     Pulmonary - normal exam   (+) ,sleep apnea  Cardiovascular - normal exam    (+) hypertension      Neuro/Psych  (+) headaches, numbness, psychiatric history  GI/Hepatic/Renal/Endo      Musculoskeletal     Abdominal  - normal exam    Bowel sounds: normal.   Substance History      OB/GYN          Other   arthritis,     ROS/Med Hx Other: TKR 2022 LMA 4              Anesthesia Plan    ASA 2     general     intravenous induction     Anesthetic plan, risks, benefits, and alternatives have been provided, discussed and informed consent has been obtained with: patient.  Pre-procedure education provided  Plan discussed with CRNA.    CODE STATUS:

## 2024-10-15 ENCOUNTER — HOSPITAL ENCOUNTER (OUTPATIENT)
Facility: HOSPITAL | Age: 69
Setting detail: HOSPITAL OUTPATIENT SURGERY
Discharge: HOME | End: 2024-10-15
Attending: STUDENT IN AN ORGANIZED HEALTH CARE EDUCATION/TRAINING PROGRAM | Admitting: STUDENT IN AN ORGANIZED HEALTH CARE EDUCATION/TRAINING PROGRAM
Payer: MEDICARE

## 2024-10-15 ENCOUNTER — ANESTHESIA (OUTPATIENT)
Dept: PERIOP | Facility: HOSPITAL | Age: 69
End: 2024-10-15
Payer: MEDICARE

## 2024-10-15 VITALS
WEIGHT: 200 LBS | RESPIRATION RATE: 10 BRPM | DIASTOLIC BLOOD PRESSURE: 91 MMHG | OXYGEN SATURATION: 92 % | SYSTOLIC BLOOD PRESSURE: 150 MMHG | TEMPERATURE: 97.5 F | HEART RATE: 75 BPM | HEIGHT: 72 IN | BODY MASS INDEX: 27.09 KG/M2

## 2024-10-15 DIAGNOSIS — K40.90 RIGHT INGUINAL HERNIA: Primary | ICD-10-CM

## 2024-10-15 PROCEDURE — 25010000002 MIDAZOLAM PER 1 MG: Performed by: NURSE ANESTHETIST, CERTIFIED REGISTERED

## 2024-10-15 PROCEDURE — 25010000002 SUGAMMADEX 200 MG/2ML SOLUTION: Performed by: NURSE ANESTHETIST, CERTIFIED REGISTERED

## 2024-10-15 PROCEDURE — 25010000002 FENTANYL CITRATE (PF) 50 MCG/ML SOLUTION: Performed by: NURSE ANESTHETIST, CERTIFIED REGISTERED

## 2024-10-15 PROCEDURE — 25010000002 PROPOFOL 200 MG/20ML EMULSION: Performed by: NURSE ANESTHETIST, CERTIFIED REGISTERED

## 2024-10-15 PROCEDURE — C1781 MESH (IMPLANTABLE): HCPCS | Performed by: STUDENT IN AN ORGANIZED HEALTH CARE EDUCATION/TRAINING PROGRAM

## 2024-10-15 PROCEDURE — 25010000002 CEFAZOLIN PER 500 MG: Performed by: STUDENT IN AN ORGANIZED HEALTH CARE EDUCATION/TRAINING PROGRAM

## 2024-10-15 PROCEDURE — 25010000002 FENTANYL CITRATE (PF) 100 MCG/2ML SOLUTION: Performed by: NURSE ANESTHETIST, CERTIFIED REGISTERED

## 2024-10-15 PROCEDURE — 25010000002 DEXAMETHASONE PER 1 MG: Performed by: NURSE ANESTHETIST, CERTIFIED REGISTERED

## 2024-10-15 PROCEDURE — 25010000002 BUPIVACAINE (PF) 0.25 % SOLUTION: Performed by: STUDENT IN AN ORGANIZED HEALTH CARE EDUCATION/TRAINING PROGRAM

## 2024-10-15 PROCEDURE — 25010000002 HYDROMORPHONE 1 MG/ML SOLUTION: Performed by: NURSE ANESTHETIST, CERTIFIED REGISTERED

## 2024-10-15 PROCEDURE — 25810000003 SODIUM CHLORIDE 0.9 % SOLUTION: Performed by: STUDENT IN AN ORGANIZED HEALTH CARE EDUCATION/TRAINING PROGRAM

## 2024-10-15 PROCEDURE — 25010000002 LIDOCAINE PF 2% 2 % SOLUTION: Performed by: NURSE ANESTHETIST, CERTIFIED REGISTERED

## 2024-10-15 PROCEDURE — 25010000002 SUCCINYLCHOLINE PER 20 MG: Performed by: NURSE ANESTHETIST, CERTIFIED REGISTERED

## 2024-10-15 PROCEDURE — 25010000002 ONDANSETRON PER 1 MG: Performed by: NURSE ANESTHETIST, CERTIFIED REGISTERED

## 2024-10-15 DEVICE — ABSORBABLE WOUND CLOSURE DEVICE
Type: IMPLANTABLE DEVICE | Site: GROIN | Status: FUNCTIONAL
Brand: V-LOC 180

## 2024-10-15 DEVICE — MESH PROGRIP LAP S/FIXATING LPG1510: Type: IMPLANTABLE DEVICE | Site: GROIN | Status: FUNCTIONAL

## 2024-10-15 RX ORDER — BUPIVACAINE HYDROCHLORIDE 2.5 MG/ML
INJECTION, SOLUTION EPIDURAL; INFILTRATION; INTRACAUDAL AS NEEDED
Status: DISCONTINUED | OUTPATIENT
Start: 2024-10-15 | End: 2024-10-15 | Stop reason: HOSPADM

## 2024-10-15 RX ORDER — HYDROCODONE BITARTRATE AND ACETAMINOPHEN 5; 325 MG/1; MG/1
1 TABLET ORAL EVERY 6 HOURS PRN
Qty: 15 TABLET | Refills: 0 | Status: SHIPPED | OUTPATIENT
Start: 2024-10-15 | End: 2024-10-28

## 2024-10-15 RX ORDER — DEXAMETHASONE SODIUM PHOSPHATE 4 MG/ML
INJECTION, SOLUTION INTRA-ARTICULAR; INTRALESIONAL; INTRAMUSCULAR; INTRAVENOUS; SOFT TISSUE AS NEEDED
Status: DISCONTINUED | OUTPATIENT
Start: 2024-10-15 | End: 2024-10-15 | Stop reason: SURG

## 2024-10-15 RX ORDER — POLYETHYLENE GLYCOL 3350 17 G/17G
17 POWDER, FOR SOLUTION ORAL DAILY
Qty: 14 EACH | Refills: 0 | Status: SHIPPED | OUTPATIENT
Start: 2024-10-15 | End: 2024-10-28

## 2024-10-15 RX ORDER — SODIUM CHLORIDE 0.9 % (FLUSH) 0.9 %
10 SYRINGE (ML) INJECTION AS NEEDED
Status: DISCONTINUED | OUTPATIENT
Start: 2024-10-15 | End: 2024-10-15 | Stop reason: HOSPADM

## 2024-10-15 RX ORDER — PROPOFOL 10 MG/ML
INJECTION, EMULSION INTRAVENOUS AS NEEDED
Status: DISCONTINUED | OUTPATIENT
Start: 2024-10-15 | End: 2024-10-15 | Stop reason: SURG

## 2024-10-15 RX ORDER — ONDANSETRON 2 MG/ML
4 INJECTION INTRAMUSCULAR; INTRAVENOUS ONCE AS NEEDED
Status: DISCONTINUED | OUTPATIENT
Start: 2024-10-15 | End: 2024-10-15 | Stop reason: HOSPADM

## 2024-10-15 RX ORDER — LABETALOL HYDROCHLORIDE 5 MG/ML
5 INJECTION, SOLUTION INTRAVENOUS
Status: DISCONTINUED | OUTPATIENT
Start: 2024-10-15 | End: 2024-10-15 | Stop reason: HOSPADM

## 2024-10-15 RX ORDER — MEPERIDINE HYDROCHLORIDE 25 MG/ML
12.5 INJECTION INTRAMUSCULAR; INTRAVENOUS; SUBCUTANEOUS
Status: DISCONTINUED | OUTPATIENT
Start: 2024-10-15 | End: 2024-10-15 | Stop reason: HOSPADM

## 2024-10-15 RX ORDER — FLUMAZENIL 0.1 MG/ML
0.2 INJECTION INTRAVENOUS AS NEEDED
Status: DISCONTINUED | OUTPATIENT
Start: 2024-10-15 | End: 2024-10-15 | Stop reason: HOSPADM

## 2024-10-15 RX ORDER — FENTANYL CITRATE 50 UG/ML
50 INJECTION, SOLUTION INTRAMUSCULAR; INTRAVENOUS
Status: DISCONTINUED | OUTPATIENT
Start: 2024-10-15 | End: 2024-10-15 | Stop reason: HOSPADM

## 2024-10-15 RX ORDER — ONDANSETRON 2 MG/ML
INJECTION INTRAMUSCULAR; INTRAVENOUS AS NEEDED
Status: DISCONTINUED | OUTPATIENT
Start: 2024-10-15 | End: 2024-10-15 | Stop reason: SURG

## 2024-10-15 RX ORDER — NALOXONE HCL 0.4 MG/ML
0.4 VIAL (ML) INJECTION AS NEEDED
Status: DISCONTINUED | OUTPATIENT
Start: 2024-10-15 | End: 2024-10-15 | Stop reason: HOSPADM

## 2024-10-15 RX ORDER — IPRATROPIUM BROMIDE AND ALBUTEROL SULFATE 2.5; .5 MG/3ML; MG/3ML
3 SOLUTION RESPIRATORY (INHALATION) ONCE AS NEEDED
Status: DISCONTINUED | OUTPATIENT
Start: 2024-10-15 | End: 2024-10-15 | Stop reason: HOSPADM

## 2024-10-15 RX ORDER — FENTANYL CITRATE 50 UG/ML
INJECTION, SOLUTION INTRAMUSCULAR; INTRAVENOUS AS NEEDED
Status: DISCONTINUED | OUTPATIENT
Start: 2024-10-15 | End: 2024-10-15

## 2024-10-15 RX ORDER — SUCCINYLCHOLINE CHLORIDE 20 MG/ML
INJECTION INTRAMUSCULAR; INTRAVENOUS AS NEEDED
Status: DISCONTINUED | OUTPATIENT
Start: 2024-10-15 | End: 2024-10-15 | Stop reason: SURG

## 2024-10-15 RX ORDER — MIDAZOLAM HYDROCHLORIDE 1 MG/ML
1 INJECTION INTRAMUSCULAR; INTRAVENOUS
Status: DISCONTINUED | OUTPATIENT
Start: 2024-10-15 | End: 2024-10-15 | Stop reason: HOSPADM

## 2024-10-15 RX ORDER — LIDOCAINE HYDROCHLORIDE 10 MG/ML
0.5 INJECTION, SOLUTION INFILTRATION; PERINEURAL ONCE AS NEEDED
Status: DISCONTINUED | OUTPATIENT
Start: 2024-10-15 | End: 2024-10-15 | Stop reason: HOSPADM

## 2024-10-15 RX ORDER — DIPHENHYDRAMINE HYDROCHLORIDE 50 MG/ML
12.5 INJECTION INTRAMUSCULAR; INTRAVENOUS
Status: DISCONTINUED | OUTPATIENT
Start: 2024-10-15 | End: 2024-10-15 | Stop reason: HOSPADM

## 2024-10-15 RX ORDER — SODIUM CHLORIDE 9 MG/ML
1000 INJECTION, SOLUTION INTRAVENOUS CONTINUOUS
Status: DISPENSED | OUTPATIENT
Start: 2024-10-15 | End: 2024-10-15

## 2024-10-15 RX ORDER — ONDANSETRON 4 MG/1
4 TABLET, FILM COATED ORAL EVERY 8 HOURS PRN
Qty: 30 TABLET | Refills: 1 | Status: SHIPPED | OUTPATIENT
Start: 2024-10-15 | End: 2024-10-28

## 2024-10-15 RX ORDER — ROCURONIUM BROMIDE 10 MG/ML
INJECTION, SOLUTION INTRAVENOUS AS NEEDED
Status: DISCONTINUED | OUTPATIENT
Start: 2024-10-15 | End: 2024-10-15

## 2024-10-15 RX ORDER — FENTANYL CITRATE 50 UG/ML
INJECTION, SOLUTION INTRAMUSCULAR; INTRAVENOUS AS NEEDED
Status: DISCONTINUED | OUTPATIENT
Start: 2024-10-15 | End: 2024-10-15 | Stop reason: SURG

## 2024-10-15 RX ORDER — OXYCODONE HYDROCHLORIDE 5 MG/1
5 TABLET ORAL ONCE AS NEEDED
Status: COMPLETED | OUTPATIENT
Start: 2024-10-15 | End: 2024-10-15

## 2024-10-15 RX ORDER — MIDAZOLAM HYDROCHLORIDE 1 MG/ML
INJECTION INTRAMUSCULAR; INTRAVENOUS AS NEEDED
Status: DISCONTINUED | OUTPATIENT
Start: 2024-10-15 | End: 2024-10-15 | Stop reason: SURG

## 2024-10-15 RX ORDER — ROCURONIUM BROMIDE 10 MG/ML
INJECTION, SOLUTION INTRAVENOUS AS NEEDED
Status: DISCONTINUED | OUTPATIENT
Start: 2024-10-15 | End: 2024-10-15 | Stop reason: SURG

## 2024-10-15 RX ORDER — OXYCODONE HYDROCHLORIDE 5 MG/1
10 TABLET ORAL EVERY 4 HOURS PRN
Status: DISCONTINUED | OUTPATIENT
Start: 2024-10-15 | End: 2024-10-15 | Stop reason: HOSPADM

## 2024-10-15 RX ORDER — ACETAMINOPHEN 325 MG/1
650 TABLET ORAL ONCE AS NEEDED
Status: DISCONTINUED | OUTPATIENT
Start: 2024-10-15 | End: 2024-10-15 | Stop reason: HOSPADM

## 2024-10-15 RX ORDER — HYDRALAZINE HYDROCHLORIDE 20 MG/ML
5 INJECTION INTRAMUSCULAR; INTRAVENOUS
Status: DISCONTINUED | OUTPATIENT
Start: 2024-10-15 | End: 2024-10-15 | Stop reason: HOSPADM

## 2024-10-15 RX ADMIN — SUGAMMADEX 200 MG: 100 INJECTION, SOLUTION INTRAVENOUS at 13:11

## 2024-10-15 RX ADMIN — MIDAZOLAM 2 MG: 1 INJECTION INTRAMUSCULAR; INTRAVENOUS at 11:54

## 2024-10-15 RX ADMIN — ROCURONIUM BROMIDE 50 MG: 10 INJECTION, SOLUTION INTRAVENOUS at 12:01

## 2024-10-15 RX ADMIN — FENTANYL CITRATE 50 MCG: 50 INJECTION, SOLUTION INTRAMUSCULAR; INTRAVENOUS at 14:00

## 2024-10-15 RX ADMIN — FENTANYL CITRATE 25 MCG: 50 INJECTION, SOLUTION INTRAMUSCULAR; INTRAVENOUS at 12:25

## 2024-10-15 RX ADMIN — ONDANSETRON 4 MG: 2 INJECTION, SOLUTION INTRAMUSCULAR; INTRAVENOUS at 12:35

## 2024-10-15 RX ADMIN — ROCURONIUM BROMIDE 10 MG: 10 INJECTION, SOLUTION INTRAVENOUS at 12:21

## 2024-10-15 RX ADMIN — ROCURONIUM BROMIDE 10 MG: 10 INJECTION, SOLUTION INTRAVENOUS at 12:27

## 2024-10-15 RX ADMIN — OXYCODONE 5 MG: 5 TABLET ORAL at 14:13

## 2024-10-15 RX ADMIN — HYDROMORPHONE HYDROCHLORIDE 0.5 MG: 1 INJECTION, SOLUTION INTRAMUSCULAR; INTRAVENOUS; SUBCUTANEOUS at 12:46

## 2024-10-15 RX ADMIN — SUCCINYLCHOLINE CHLORIDE 160 MG: 20 INJECTION, SOLUTION INTRAMUSCULAR; INTRAVENOUS at 12:01

## 2024-10-15 RX ADMIN — FENTANYL CITRATE 25 MCG: 50 INJECTION, SOLUTION INTRAMUSCULAR; INTRAVENOUS at 12:23

## 2024-10-15 RX ADMIN — LIDOCAINE HYDROCHLORIDE 100 MG: 20 INJECTION, SOLUTION EPIDURAL; INFILTRATION; INTRACAUDAL; PERINEURAL at 12:01

## 2024-10-15 RX ADMIN — FENTANYL CITRATE 50 MCG: 50 INJECTION, SOLUTION INTRAMUSCULAR; INTRAVENOUS at 13:35

## 2024-10-15 RX ADMIN — SODIUM CHLORIDE 2000 MG: 900 INJECTION INTRAVENOUS at 11:49

## 2024-10-15 RX ADMIN — SODIUM CHLORIDE 1000 ML: 9 INJECTION, SOLUTION INTRAVENOUS at 11:49

## 2024-10-15 RX ADMIN — HYDROMORPHONE HYDROCHLORIDE 0.5 MG: 1 INJECTION, SOLUTION INTRAMUSCULAR; INTRAVENOUS; SUBCUTANEOUS at 13:17

## 2024-10-15 RX ADMIN — DEXAMETHASONE SODIUM PHOSPHATE 4 MG: 4 INJECTION, SOLUTION INTRAMUSCULAR; INTRAVENOUS at 12:35

## 2024-10-15 RX ADMIN — FENTANYL CITRATE 50 MCG: 50 INJECTION, SOLUTION INTRAMUSCULAR; INTRAVENOUS at 12:01

## 2024-10-15 RX ADMIN — ROCURONIUM BROMIDE 10 MG: 10 INJECTION, SOLUTION INTRAVENOUS at 12:46

## 2024-10-15 RX ADMIN — PROPOFOL 140 MG: 10 INJECTION, EMULSION INTRAVENOUS at 12:01

## 2024-10-15 NOTE — ANESTHESIA PROCEDURE NOTES
Airway  Urgency: elective    Date/Time: 10/15/2024 12:01 PM  Airway not difficult    General Information and Staff    Patient location during procedure: OR  CRNA/CAA: Melida Rizzo CRNA    Indications and Patient Condition  Indications for airway management: airway protection    Preoxygenated: yes  Mask difficulty assessment: 0 - not attempted    Final Airway Details  Final airway type: endotracheal airway      Successful airway: ETT  Cuffed: yes   Successful intubation technique: video laryngoscopy  Facilitating devices/methods: intubating stylet  Endotracheal tube insertion site: oral  Blade: Kong  Blade size: 3  ETT size (mm): 7.0  Cormack-Lehane Classification: grade I - full view of glottis  Placement verified by: chest auscultation and capnometry   Measured from: lips  ETT/EBT  to lips (cm): 22  Number of attempts at approach: 1  Assessment: lips, teeth, and gum same as pre-op and atraumatic intubation

## 2024-10-15 NOTE — ANESTHESIA POSTPROCEDURE EVALUATION
Patient: Nahum Restrepo    Procedure Summary       Date: 10/15/24 Room / Location: [facility] OR 04 / [facility] MAIN OR    Anesthesia Start: 1154 Anesthesia Stop: 1321    Procedure: INGUINAL HERNIA REPAIR LAPAROSCOPIC WITH DAVINCI ROBOT WITH MESH (Right: Abdomen) Diagnosis:       Right inguinal hernia      (Right inguinal hernia [K40.90])    Surgeons: Dorian Pepe MD Provider: Moises Friedman MD    Anesthesia Type: general ASA Status: 2            Anesthesia Type: general    Vitals  Vitals Value Taken Time   /84 10/15/24 1422   Temp 97.9 °F (36.6 °C) 10/15/24 1420   Pulse 76 10/15/24 1423   Resp 10 10/15/24 1420   SpO2 95 % 10/15/24 1423   Vitals shown include unfiled device data.        Post Anesthesia Care and Evaluation    Patient location during evaluation: PACU  Patient participation: complete - patient participated  Level of consciousness: awake  Pain scale: See nurse's notes for pain score.  Pain management: adequate    Airway patency: patent  Anesthetic complications: No anesthetic complications  PONV Status: none  Cardiovascular status: acceptable  Respiratory status: acceptable and spontaneous ventilation  Hydration status: acceptable    Comments: Patient seen and examined postoperatively; vital signs stable; SpO2 greater than or equal to 90%; cardiopulmonary status stable; nausea/vomiting adequately controlled; pain adequately controlled; no apparent anesthesia complications; patient discharged from anesthesia care when discharge criteria were met

## 2024-10-18 ENCOUNTER — TELEPHONE (OUTPATIENT)
Dept: SURGERY | Facility: CLINIC | Age: 69
End: 2024-10-18
Payer: MEDICARE

## 2024-10-18 NOTE — TELEPHONE ENCOUNTER
Called Nahmu Restrepo to check PO status.  Nahum Restrepo reports doing well.  PO appt scheduled.

## 2024-10-24 NOTE — OP NOTE
Operative Report:    Patient Name:  Nahum Restrepo  YOB: 1955    Date of Surgery:  10/15/2024     Indications:    68-year-old gentleman with no significant past medical or surgical history who presents to discuss his right inguinal pain and bulge.  This been present for the past few months worse with physical activity.  Denies nausea vomiting or obstipation.  On exam he has reducible right inguinal hernia.  No left inguinal bulge.  I discussed risk benefits and alternatives to robotic right, possible bilateral, inguinal hernia repair, including bowel injury, mesh infection requiring mesh excision, recurrence, bleeding, injury to testicular vessels causing pain and possible need for orchiectomy, injury to vas deferens causing decrease in fertility rate, and chronic inguinal pain resulting from nerve injury and patient elected to proceed.      Pre-op Diagnosis:   Right inguinal hernia [K40.90]       Post-Op Diagnosis Codes:     * Right inguinal hernia [K40.90]    Procedure/CPT® Codes:      Procedure(s):  INGUINAL HERNIA REPAIR LAPAROSCOPIC WITH DAVINCI ROBOT WITH MESH    Staff:  Surgeon(s):  Dorian Pepe MD    Circulator: Mary Degroot RN  Scrub Person: Milady Langley  Assistant: Deya Balbuena CSA  was responsible for performing the following activities: Retraction, Suction, Irrigation, Suturing, Closing, Placing Dressing, and Held/Positioned Camera and their skilled assistance was necessary for the success of this case.        Anesthesia: General    Estimated Blood Loss: 5 mL    Implants:    Implant Name Type Inv. Item Serial No.  Lot No. LRB No. Used Action   DEV CLS WND VLOC/PBT THERESE NONABS 1/2CIR SZ3/0 26MM 23CM GRN - [id number] Implant DEV CLS WND VLOC/PBT THERESE NONABS 1/2CIR SZ3/0 26MM 23CM GRN  [employer]OVID[employer] [id number] Right 1 Implanted   MESH PROGRIP LAP S/FIXATING [id number] - [id number] Implant MESH PROGRIP LAP S/FIXATING [id number]  COVID[employer]EN [id number] Right 1 Implanted       Specimen:           None        Findings: Moderate size right-sided indirect inguinal hernia, no left-sided inguinal hernia    Complications: None    Description of Procedure:   After risk benefits and alternatives were discussed with patient informed consent was obtained.  Patient was transported to the operating room and placed supine on the operating room table.  He underwent general anesthesia.  The abdomen was prepped and draped in a sterile fashion and a surgical timeout was completed.    An 8 mm incision was made in the patient's left upper quadrant with an 11 blade scalpel.  A 5 mm Optiview trocar was used to gain entry into the patient's abdomen and insufflated the patient's abdomen successfully.  The area immediately underlying our entry was inspected with no injury identified.  An 8 mm trocar was placed in the patient's right upper quadrant and a third 8 mm trocar through the patient's left rectus muscle.  I upsized my entry trocar to an 8 mm robotic trocar.  Patient was placed in Trendelenburg position and the da Ángela robot was docked.    Using a combination of blunt dissection as well as electrocautery I made a preperitoneal flap 6 centimeters above the patient's right inguinal hernia defect, extending from the medial umbilical ligament to the patient's ASIS.  I dissected this flap off of the posterior abdominal wall until identified Osbaldo's ligament and the pubic tubercle.  I then dissected laterally until there was enough space to place mesh.  I exposed the direct hernia space as well as the femoral space no hernias were identified there.  There was a moderate-sized indirect hernia sac I carefully dissected this off of the patient's cord structures.  I dissected the peritoneum off of the cord structures taking care to preserve these.  I continued dissection until there was enough space to place the inferior portion of the mesh.    I placed a 10 cm x 15 cm permanent progrip mesh over the patient's right inguinal  defect.  I inspected the area and ensured adequate hemostasis.  I closed the peritoneal flap with 3.0 absorbable V-Loc suture in order to completely cover the mesh and keep it out of the peritoneal cavity.  The patient's abdomen was desufflated.  The trochars were removed and the skin incisions were closed with interrupted 4-0 Vicryl suture.  The surgical incisions were covered with surgical glue.  The patient was awoken from general anesthesia and transported to PACU without incident.         Dorian Pepe MD     Date: 10/24/2024  Time: 09:48 EDT    This note was created using Dragon Voice Recognition software.

## 2024-10-28 ENCOUNTER — OFFICE VISIT (OUTPATIENT)
Dept: SURGERY | Facility: CLINIC | Age: 69
End: 2024-10-28
Payer: MEDICARE

## 2024-10-28 VITALS
OXYGEN SATURATION: 95 % | SYSTOLIC BLOOD PRESSURE: 122 MMHG | HEART RATE: 92 BPM | BODY MASS INDEX: 27.77 KG/M2 | WEIGHT: 205 LBS | DIASTOLIC BLOOD PRESSURE: 72 MMHG | TEMPERATURE: 97.1 F | HEIGHT: 72 IN

## 2024-10-28 DIAGNOSIS — K40.90 RIGHT INGUINAL HERNIA: Primary | ICD-10-CM

## 2024-10-28 PROCEDURE — 3074F SYST BP LT 130 MM HG: CPT

## 2024-10-28 PROCEDURE — 1159F MED LIST DOCD IN RCRD: CPT

## 2024-10-28 PROCEDURE — 3078F DIAST BP <80 MM HG: CPT

## 2024-10-28 PROCEDURE — 99024 POSTOP FOLLOW-UP VISIT: CPT

## 2024-10-28 PROCEDURE — 1160F RVW MEDS BY RX/DR IN RCRD: CPT

## 2024-10-28 NOTE — PROGRESS NOTES
"Chief Complaint  Post-op Follow-up (POST-OP LAP HERNIA REPAIR 10/15/24 with Dr. Pepe )    Subjective        Nahum Restrepo presents to [facility] GENERAL SURGERY status post robotic right inguinal hernia repair with Dr. Pepe on 10/15/2024.  Overall doing well, no complaints.  Tolerating diet without nausea and vomiting.  Having regular bowel function.  History of Present Illness    Objective   Vital Signs:  /72 (BP Location: Left arm, Patient Position: Sitting, Cuff Size: Adult)   Pulse 92   Temp 97.1 °F (36.2 °C) (Infrared)   Ht 182.9 cm (72\")   Wt 93 kg (205 lb)   SpO2 95%   BMI 27.80 kg/m²   Estimated body mass index is 27.8 kg/m² as calculated from the following:    Height as of this encounter: 182.9 cm (72\").    Weight as of this encounter: 93 kg (205 lb).            Physical Exam  Constitutional:       General: He is not in acute distress.  Cardiovascular:      Rate and Rhythm: Normal rate.   Pulmonary:      Effort: Pulmonary effort is normal. No respiratory distress.   Abdominal:      General: There is no distension.      Palpations: Abdomen is soft.      Tenderness: There is no abdominal tenderness. There is no guarding.      Comments: Incisions appear healed.   Neurological:      Mental Status: He is alert. Mental status is at baseline.   Psychiatric:         Mood and Affect: Mood normal.         Behavior: Behavior normal.        Result Review :                Assessment and Plan   Diagnoses and all orders for this visit:    1. Right inguinal hernia (Primary)    status post robotic right inguinal hernia repair with Dr. Pepe on 10/15/2024.  Overall doing well, no complaints.  Tolerating diet without nausea and vomiting.  Having regular bowel function.  Discussed physical restrictions of no lifting greater than 30 pounds until 6 weeks postop.  Follow-up in 1 month         Follow Up   No follow-ups on file.  Patient was given instructions and counseling regarding his " condition or for health maintenance advice. Please see specific information pulled into the AVS if appropriate.

## 2024-11-12 ENCOUNTER — OFFICE VISIT (OUTPATIENT)
Dept: ORTHOPEDIC SURGERY | Facility: CLINIC | Age: 69
End: 2024-11-12
Payer: MEDICARE

## 2024-11-12 VITALS — BODY MASS INDEX: 27.77 KG/M2 | WEIGHT: 205 LBS | OXYGEN SATURATION: 95 % | HEIGHT: 72 IN | HEART RATE: 89 BPM

## 2024-11-12 DIAGNOSIS — M70.61 TROCHANTERIC BURSITIS OF RIGHT HIP: Primary | ICD-10-CM

## 2024-11-12 NOTE — PROGRESS NOTES
FOLLOW UP VISIT        Patient Name: Nahum Restrepo  : 1955  Primary Care Physician: Leonardo Lujan MD        Chief Complaint:  right hip pain    HPI:   Nahum Restrepo is a 68 y.o. year old who presents today for follow up of his right hip pain. He is treated ~ Q3 months with steroid injection to the right greater trochanteric bursa. His last injection was 3 months ago. He states he has no pain today; pain rated 0/10. He states the last injection worked very well.       Past Medical/Surgical, Social and Family History:  I have reviewed and/or updated pertinent history as noted in the medical record including:  Past Medical History:   Diagnosis Date    Hypertension     Mild anxiety     Primary osteoarthritis of right knee 2021    history of    S/P right knee arthroscopy 2021     Past Surgical History:   Procedure Laterality Date    CARDIAC CATHETERIZATION      COLONOSCOPY      INGUINAL HERNIA REPAIR Right 10/15/2024    Procedure: INGUINAL HERNIA REPAIR LAPAROSCOPIC WITH DAVINCI ROBOT WITH MESH;  Surgeon: Dorian Pepe MD;  Location: [facility] OR;  Service: Robotics - DaVinci;  Laterality: Right;    JOINT REPLACEMENT      KNEE ARTHROSCOPY Right 2021    Procedure: KNEE ARTHROSCOPY with chondroplasty with medial meniscectomy;  Surgeon: Km Felix MD;  Location: [facility] OR;  Service: Orthopedics;  Laterality: Right;    REPLACEMENT TOTAL KNEE Right     SHOULDER SURGERY Left     TOTAL KNEE ARTHROPLASTY Right 2022    Procedure: TOTAL KNEE ARTHROPLASTY WITH MINGO ROBOT;  Surgeon: Km Felix MD;  Location: [facility] OR;  Service: Robotics - Ortho;  Laterality: Right;     Social History     Occupational History    Not on file   Tobacco Use    Smoking status: Never     Passive exposure: Past    Smokeless tobacco: Never   Vaping Use    Vaping status: Never Used   Substance and Sexual Activity    Alcohol use: Yes     Alcohol/week: 4.0 standard drinks of alcohol      Types: 4 Shots of liquor per week     Comment: 6-7 per week    Drug use: Yes     Frequency: 4.0 times per week     Types: Marijuana    Sexual activity: Not Currently      Social History     Social History Narrative    Not on file     Family History   Problem Relation Age of Onset    Arthritis Mother     Osteoarthritis Mother     Other Mother         Immobilized    Sleep apnea Sister     Diabetes Sister        Allergies: No Known Allergies    Medications:   Home Medications:  Current Outpatient Medications on File Prior to Visit   Medication Sig    alfuzosin (UROXATRAL) 10 MG 24 hr tablet Take 1 tablet by mouth Daily.    ALPRAZolam (XANAX) 0.5 MG tablet TAKE 1 TABLET BY MOUTH TWICE DAILY AS NEEDED    amLODIPine (NORVASC) 5 MG tablet TAKE 1 TABLET BY MOUTH DAILY    gabapentin (NEURONTIN) 100 MG capsule Take 1 capsule by mouth Every 12 (Twelve) Hours.    multivitamin with minerals tablet tablet Take 1 tablet by mouth Daily.    valsartan-hydrochlorothiazide (DIOVAN-HCT) 320-25 MG per tablet TAKE 1 TABLET BY MOUTH DAILY     No current facility-administered medications on file prior to visit.         ROS:  14 point review of systems was negative except as listed in the HPI     Physical Exam:   68 y.o. male  Body mass index is 27.8 kg/m²., 93 kg (205 lb)  Vitals:    11/12/24 1449   Pulse: 89   SpO2: 95%         General: Alert, cooperative, appears well and in no observable distress.   HEENT: Normocephalic, atraumatic on external visual inspection. No icterus.   CV: No significant peripheral edema.   Respiratory: Normal respiratory effort.   Skin: Warm & well perfused; appropriate skin turgor.  Psych: Appropriate mood & affect.  Neuro: Gross sensation and motor intact in affected extremity/extremities.  Vascular: Peripheral pulses palpable in affected extremity/extremities. Calves/compartments soft and non tender, no evidence of DVT or compartment syndrome.                 Assessment:  Right hip greater trochanteric  bursitis   Body mass index is 27.8 kg/m².  BMI consistent with Overweight: 25.0-29.9kg/m2              Plan:  Patient asymptomatic today  Do not recommend injection  Please call when/if hip begins to hurt and will work in for injections  Patient encouraged to call with questions or concerns in the interim      FEI Alex

## 2024-11-13 ENCOUNTER — LAB (OUTPATIENT)
Dept: FAMILY MEDICINE CLINIC | Facility: CLINIC | Age: 69
End: 2024-11-13
Payer: MEDICARE

## 2024-11-13 DIAGNOSIS — G60.9 IDIOPATHIC PERIPHERAL NEUROPATHY: ICD-10-CM

## 2024-11-13 DIAGNOSIS — R73.03 PREDIABETES: ICD-10-CM

## 2024-11-13 DIAGNOSIS — I10 PRIMARY HYPERTENSION: Primary | ICD-10-CM

## 2024-11-13 LAB
ALBUMIN SERPL-MCNC: 4 G/DL (ref 3.5–5.2)
ALBUMIN/GLOB SERPL: 1.1 G/DL
ALP SERPL-CCNC: 69 U/L (ref 39–117)
ALT SERPL W P-5'-P-CCNC: 19 U/L (ref 1–41)
ANION GAP SERPL CALCULATED.3IONS-SCNC: 12.5 MMOL/L (ref 5–15)
AST SERPL-CCNC: 21 U/L (ref 1–40)
BASOPHILS # BLD AUTO: 0.04 10*3/MM3 (ref 0–0.2)
BASOPHILS NFR BLD AUTO: 0.6 % (ref 0–1.5)
BILIRUB SERPL-MCNC: 0.4 MG/DL (ref 0–1.2)
BUN SERPL-MCNC: 18 MG/DL (ref 8–23)
BUN/CREAT SERPL: 22.5 (ref 7–25)
CALCIUM SPEC-SCNC: 9.5 MG/DL (ref 8.6–10.5)
CHLORIDE SERPL-SCNC: 100 MMOL/L (ref 98–107)
CO2 SERPL-SCNC: 24.5 MMOL/L (ref 22–29)
CREAT SERPL-MCNC: 0.8 MG/DL (ref 0.76–1.27)
DEPRECATED RDW RBC AUTO: 37.9 FL (ref 37–54)
EGFRCR SERPLBLD CKD-EPI 2021: 95.8 ML/MIN/1.73
EOSINOPHIL # BLD AUTO: 0.24 10*3/MM3 (ref 0–0.4)
EOSINOPHIL NFR BLD AUTO: 3.3 % (ref 0.3–6.2)
ERYTHROCYTE [DISTWIDTH] IN BLOOD BY AUTOMATED COUNT: 11.9 % (ref 12.3–15.4)
GLOBULIN UR ELPH-MCNC: 3.5 GM/DL
GLUCOSE SERPL-MCNC: 102 MG/DL (ref 65–99)
HBA1C MFR BLD: 5.7 % (ref 4.8–5.6)
HCT VFR BLD AUTO: 41 % (ref 37.5–51)
HGB BLD-MCNC: 14.1 G/DL (ref 13–17.7)
IMM GRANULOCYTES # BLD AUTO: 0.07 10*3/MM3 (ref 0–0.05)
IMM GRANULOCYTES NFR BLD AUTO: 1 % (ref 0–0.5)
LYMPHOCYTES # BLD AUTO: 1.58 10*3/MM3 (ref 0.7–3.1)
LYMPHOCYTES NFR BLD AUTO: 22 % (ref 19.6–45.3)
MCH RBC QN AUTO: 29.7 PG (ref 26.6–33)
MCHC RBC AUTO-ENTMCNC: 34.4 G/DL (ref 31.5–35.7)
MCV RBC AUTO: 86.3 FL (ref 79–97)
MONOCYTES # BLD AUTO: 0.62 10*3/MM3 (ref 0.1–0.9)
MONOCYTES NFR BLD AUTO: 8.6 % (ref 5–12)
NEUTROPHILS NFR BLD AUTO: 4.62 10*3/MM3 (ref 1.7–7)
NEUTROPHILS NFR BLD AUTO: 64.5 % (ref 42.7–76)
NRBC BLD AUTO-RTO: 0 /100 WBC (ref 0–0.2)
PLATELET # BLD AUTO: 206 10*3/MM3 (ref 140–450)
PMV BLD AUTO: 10 FL (ref 6–12)
POTASSIUM SERPL-SCNC: 3.9 MMOL/L (ref 3.5–5.2)
PROT SERPL-MCNC: 7.5 G/DL (ref 6–8.5)
RBC # BLD AUTO: 4.75 10*6/MM3 (ref 4.14–5.8)
SODIUM SERPL-SCNC: 137 MMOL/L (ref 136–145)
WBC NRBC COR # BLD AUTO: 7.17 10*3/MM3 (ref 3.4–10.8)

## 2024-11-13 PROCEDURE — 85025 COMPLETE CBC W/AUTO DIFF WBC: CPT | Performed by: FAMILY MEDICINE

## 2024-11-13 PROCEDURE — 36415 COLL VENOUS BLD VENIPUNCTURE: CPT

## 2024-11-13 PROCEDURE — 80053 COMPREHEN METABOLIC PANEL: CPT | Performed by: FAMILY MEDICINE

## 2024-11-13 PROCEDURE — 83036 HEMOGLOBIN GLYCOSYLATED A1C: CPT | Performed by: FAMILY MEDICINE

## 2024-11-20 ENCOUNTER — TELEPHONE (OUTPATIENT)
Dept: FAMILY MEDICINE CLINIC | Facility: CLINIC | Age: 69
End: 2024-11-20
Payer: MEDICARE

## 2024-11-20 NOTE — TELEPHONE ENCOUNTER
"    Caller: Tiffani Restrepo \"CLAIR\"    Relationship: Self    Best call back number: [phone]     Caller requesting test results: TIFFANI    What test was performed: LABS    When was the test performed: 11/13/24    Where was the test performed: IN OFFICE    Additional notes:         " No

## 2024-11-20 NOTE — TELEPHONE ENCOUNTER
Yissel Hammer that his lab test look excellent.  His white blood count is normal as is his hemoglobin.  Kidney function test and liver function test and electrolytes are perfect.  His A1c is normal.  Keep up the good work

## 2024-11-21 NOTE — TELEPHONE ENCOUNTER
RELAY  Left detailed voicemail for Nahum Restrepo as per verbal release. Advised Nahum Restrepo to call the office with any additional questions.  Yissel tell Harman that his lab test look excellent. His white blood count is normal as is his hemoglobin. Kidney function test and liver function test and electrolytes are perfect. His A1c is normal. Keep up the good work

## 2024-11-25 ENCOUNTER — OFFICE VISIT (OUTPATIENT)
Dept: SURGERY | Facility: CLINIC | Age: 69
End: 2024-11-25
Payer: MEDICARE

## 2024-11-25 VITALS
DIASTOLIC BLOOD PRESSURE: 77 MMHG | SYSTOLIC BLOOD PRESSURE: 127 MMHG | OXYGEN SATURATION: 93 % | WEIGHT: 202 LBS | HEIGHT: 72 IN | TEMPERATURE: 98.4 F | HEART RATE: 89 BPM | BODY MASS INDEX: 27.36 KG/M2

## 2024-11-25 DIAGNOSIS — K40.90 RIGHT INGUINAL HERNIA: Primary | ICD-10-CM

## 2024-11-25 PROCEDURE — 3074F SYST BP LT 130 MM HG: CPT

## 2024-11-25 PROCEDURE — 1160F RVW MEDS BY RX/DR IN RCRD: CPT

## 2024-11-25 PROCEDURE — 99024 POSTOP FOLLOW-UP VISIT: CPT

## 2024-11-25 PROCEDURE — 1159F MED LIST DOCD IN RCRD: CPT

## 2024-11-25 PROCEDURE — 3078F DIAST BP <80 MM HG: CPT

## 2024-11-25 RX ORDER — TRIAMCINOLONE ACETONIDE 55 UG/1
1 SPRAY, METERED NASAL DAILY
COMMUNITY
Start: 2024-11-12

## 2024-11-25 NOTE — PROGRESS NOTES
"Chief Complaint  Post-op Follow-up (POST-OP 1 month follow up LAP HERNIA REPAIR 10/15/24 with Dr. Pepe )    Subjective        Nahum Restrepo presents to [facility] GENERAL SURGERY status post robotic right inguinal hernia repair with Dr. Pepe on 10/15/2024.  Overall doing well, no complaints.  Denies pain.  Tolerating diet without nausea or vomiting.  Had regular bowel function.  Post-op Follow-up      Objective   Vital Signs:  /77 (BP Location: Left arm, Patient Position: Sitting, Cuff Size: Adult)   Pulse 89   Temp 98.4 °F (36.9 °C) (Infrared)   Ht 182.9 cm (72\")   Wt 91.6 kg (202 lb)   SpO2 93%   BMI 27.40 kg/m²   Estimated body mass index is 27.4 kg/m² as calculated from the following:    Height as of this encounter: 182.9 cm (72\").    Weight as of this encounter: 91.6 kg (202 lb).            Physical Exam  Constitutional:       General: He is not in acute distress.  Cardiovascular:      Rate and Rhythm: Normal rate.   Pulmonary:      Effort: Pulmonary effort is normal. No respiratory distress.   Abdominal:      General: There is no distension.      Palpations: Abdomen is soft.      Tenderness: There is no abdominal tenderness. There is no guarding.      Comments: Incisions appear to be healing.   Neurological:      Mental Status: He is alert.   Psychiatric:         Mood and Affect: Mood normal.         Behavior: Behavior normal.        Result Review :                Assessment and Plan   Diagnoses and all orders for this visit:    1. Right inguinal hernia (Primary)    Status post robotic right inguinal hernia repair with Dr. Pepe on 10/15/2024.  Overall doing well, no complaints.  Denies pain.  Tolerating diet without nausea or vomiting.  Had regular bowel function.  No further physical restrictions.  Can follow-up as needed         Follow Up   No follow-ups on file.  Patient was given instructions and counseling regarding his condition or for health maintenance advice. " Please see specific information pulled into the AVS if appropriate.

## 2024-12-02 RX ORDER — METOPROLOL SUCCINATE 50 MG/1
50 TABLET, EXTENDED RELEASE ORAL DAILY
Qty: 90 TABLET | Refills: 3 | Status: SHIPPED | OUTPATIENT
Start: 2024-12-02

## 2024-12-03 DIAGNOSIS — F41.9 ANXIETY: ICD-10-CM

## 2024-12-04 RX ORDER — ALPRAZOLAM 0.5 MG
TABLET ORAL
Qty: 60 TABLET | Refills: 2 | Status: SHIPPED | OUTPATIENT
Start: 2024-12-04

## 2024-12-11 ENCOUNTER — TELEPHONE (OUTPATIENT)
Dept: NEUROSURGERY | Facility: CLINIC | Age: 69
End: 2024-12-11
Payer: MEDICARE

## 2024-12-11 NOTE — TELEPHONE ENCOUNTER
Patients pharmacy wants to  know if it is okay to refill the cream. I let them know I can ask. They stated they will fill and if it was okay with one refill after I stated yes.

## 2024-12-11 NOTE — TELEPHONE ENCOUNTER
I have never seen or evaluated this patient and thus will not refill this medication.  He can obtain from his PCP

## 2024-12-13 ENCOUNTER — OFFICE VISIT (OUTPATIENT)
Dept: ORTHOPEDIC SURGERY | Facility: CLINIC | Age: 69
End: 2024-12-13
Payer: MEDICARE

## 2024-12-13 VITALS — WEIGHT: 202 LBS | BODY MASS INDEX: 27.36 KG/M2 | HEIGHT: 72 IN | OXYGEN SATURATION: 96 % | RESPIRATION RATE: 20 BRPM

## 2024-12-13 DIAGNOSIS — M70.61 TROCHANTERIC BURSITIS OF RIGHT HIP: Primary | ICD-10-CM

## 2024-12-13 RX ORDER — TRIAMCINOLONE ACETONIDE 40 MG/ML
80 INJECTION, SUSPENSION INTRA-ARTICULAR; INTRAMUSCULAR
Status: COMPLETED | OUTPATIENT
Start: 2024-12-13 | End: 2024-12-13

## 2024-12-13 RX ORDER — LIDOCAINE HYDROCHLORIDE 10 MG/ML
2 INJECTION, SOLUTION INFILTRATION; PERINEURAL
Status: COMPLETED | OUTPATIENT
Start: 2024-12-13 | End: 2024-12-13

## 2024-12-13 RX ADMIN — TRIAMCINOLONE ACETONIDE 80 MG: 40 INJECTION, SUSPENSION INTRA-ARTICULAR; INTRAMUSCULAR at 14:19

## 2024-12-13 RX ADMIN — LIDOCAINE HYDROCHLORIDE 2 ML: 10 INJECTION, SOLUTION INFILTRATION; PERINEURAL at 14:19

## 2024-12-13 NOTE — PROGRESS NOTES
INJECTION VISIT    Patient: Nahum Restrepo    YOB: 1955    MRN: [id number]    Chief Complaint   Patient presents with    Right Hip - Follow-up     Would like injection        History of Present Illness:   Nahum Restrepo is a 68 y.o. year old who presents today for injection of the right hip. Last injection was 5 months ago and provided good relief of symptoms.         Allergies: No Known Allergies    Medications:   Home Medications:  Current Outpatient Medications on File Prior to Visit   Medication Sig    alfuzosin (UROXATRAL) 10 MG 24 hr tablet Take 1 tablet by mouth Daily.    ALPRAZolam (XANAX) 0.5 MG tablet TAKE 1 TABLET BY MOUTH TWICE DAILY AS NEEDED    amLODIPine (NORVASC) 5 MG tablet TAKE 1 TABLET BY MOUTH DAILY    gabapentin (NEURONTIN) 100 MG capsule Take 1 capsule by mouth Every 12 (Twelve) Hours.    metoprolol succinate XL (TOPROL-XL) 50 MG 24 hr tablet TAKE 1 TABLET BY MOUTH DAILY    multivitamin with minerals tablet tablet Take 1 tablet by mouth Daily.    Triamcinolone Acetonide (NASACORT) 55 MCG/ACT nasal inhaler 1 spray Daily.    valsartan-hydrochlorothiazide (DIOVAN-HCT) 320-25 MG per tablet TAKE 1 TABLET BY MOUTH DAILY     No current facility-administered medications on file prior to visit.         I have reviewed the patient's medical history in detail and updated the computerized patient record.  Review and summarization of old records include:    Past Medical History:   Diagnosis Date    Hypertension     Mild anxiety     Primary osteoarthritis of right knee 05/18/2021    history of    S/P right knee arthroscopy 07/07/2021     Past Surgical History:   Procedure Laterality Date    CARDIAC CATHETERIZATION      COLONOSCOPY      INGUINAL HERNIA REPAIR Right 10/15/2024    Procedure: INGUINAL HERNIA REPAIR LAPAROSCOPIC WITH DAVINCI ROBOT WITH MESH;  Surgeon: Dorian Pepe MD;  Location: [facility] MAIN OR;  Service: Robotics - DaVinci;  Laterality: Right;    JOINT REPLACEMENT      KNEE  ARTHROSCOPY Right 06/16/2021    Procedure: KNEE ARTHROSCOPY with chondroplasty with medial meniscectomy;  Surgeon: Km Felix MD;  Location: [facility] MAIN OR;  Service: Orthopedics;  Laterality: Right;    REPLACEMENT TOTAL KNEE Right 2022    SHOULDER SURGERY Left     TOTAL KNEE ARTHROPLASTY Right 06/21/2022    Procedure: TOTAL KNEE ARTHROPLASTY WITH MINGO ROBOT;  Surgeon: Km Felix MD;  Location: [facility] MAIN OR;  Service: Robotics - Ortho;  Laterality: Right;     Social History     Occupational History    Not on file   Tobacco Use    Smoking status: Never     Passive exposure: Past    Smokeless tobacco: Never   Vaping Use    Vaping status: Never Used   Substance and Sexual Activity    Alcohol use: Yes     Alcohol/week: 4.0 standard drinks of alcohol     Types: 4 Shots of liquor per week     Comment: 6-7 per week    Drug use: Yes     Frequency: 4.0 times per week     Types: Marijuana    Sexual activity: Not Currently      Social History     Social History Narrative    Not on file     Family History   Problem Relation Age of Onset    Arthritis Mother     Osteoarthritis Mother     Other Mother         Immobilized    Sleep apnea Sister     Diabetes Sister                ROS  Negative unless listed in the HPI        Physical Exam  68 y.o. male  Body mass index is 27.4 kg/m²., 91.6 kg (202 lb)  Vitals:    12/13/24 1349   Resp: 20   SpO2: 96%     General: Alert, cooperative, appears well and in no observable distress.   HEENT: Normocephalic, atraumatic on external visual inspection. No icterus.   CV: No significant peripheral edema.   Respiratory: Normal respiratory effort.   Skin: Warm & well perfused; appropriate skin turgor.  Psych: Appropriate mood & affect.  Neuro: Gross sensation and motor intact in affected extremity/extremities.  Vascular: Peripheral pulses palpable in affected extremity/extremities.         Procedure:  Large Joint Arthrocentesis: R greater trochanteric bursa  Date/Time: 12/13/2024 2:19  PM  Consent given by: patient  Site marked: site marked  Timeout: Immediately prior to procedure a time out was called to verify the correct patient, procedure, equipment, support staff and site/side marked as required   Supporting Documentation  Indications: pain and diagnostic evaluation   Procedure Details  Location: hip - R greater trochanteric bursa  Needle size: 25 G  Medications administered: 2 mL lidocaine 1 %; 80 mg triamcinolone acetonide 40 MG/ML  Patient tolerance: patient tolerated the procedure well with no immediate complications            Assessment:   Diagnoses and all orders for this visit:    1. Trochanteric bursitis of right hip (Primary)    Other orders  -     Large Joint Arthrocentesis      Body mass index is 27.4 kg/m².  BMI consistent with Overweight: 25.0-29.9kg/m2         Plan:   -     Risks and benefits of injection therapy discussed with patient.    Injected patient's right hip-greater trochanteric bursa joint(s)with Kenalog from an lateral approach   Rest, ice, compression, and elevation (RICE) therapy  OTC Tylenol for pain PRN  Follow up in 3 months or PRN for repeat injection   Please call with questions or concerns in the interim        Date of encounter: 12/13/2024   FEI Alex

## 2025-01-16 ENCOUNTER — OFFICE VISIT (OUTPATIENT)
Dept: SLEEP MEDICINE | Facility: CLINIC | Age: 70
End: 2025-01-16
Payer: MEDICARE

## 2025-01-16 VITALS
WEIGHT: 200 LBS | BODY MASS INDEX: 27.09 KG/M2 | SYSTOLIC BLOOD PRESSURE: 133 MMHG | HEART RATE: 69 BPM | DIASTOLIC BLOOD PRESSURE: 76 MMHG | HEIGHT: 72 IN | OXYGEN SATURATION: 95 %

## 2025-01-16 DIAGNOSIS — G47.33 OSA (OBSTRUCTIVE SLEEP APNEA): Primary | ICD-10-CM

## 2025-01-16 DIAGNOSIS — E66.3 OVERWEIGHT (BMI 25.0-29.9): ICD-10-CM

## 2025-01-16 PROCEDURE — G0463 HOSPITAL OUTPT CLINIC VISIT: HCPCS

## 2025-01-16 NOTE — PROGRESS NOTES
"  [address] [address]  Phone   Fax         SLEEP CLINIC FOLLOW-UP PROGRESS NOTE    Nahum Restrepo  [id number]   1955  69 y.o.  male      PCP: Leonardo Lujan MD    DATE OF VISIT: 1/16/2025          CHIEF COMPLAINT: Obstructive sleep apnea    HPI:  This is a 69 y.o. year old patient who presents to the clinic today for the management of obstructive sleep apnea.   Patient had a(n) home sleep study in 9/2023 showing obstructive sleep apnea with AHI of 11/hr. patient had 12 minutes with O2 sats below 89% during the study.  This patient is using positive airway pressure therapy with auto CPAP.   Patient's sleep apnea has improved with this therapy with residual AHI 0.5/hr.   Average usage is 6 hours 34 minutes per night on nights used.   Patient tolerating device well and improved with treatment.            MEDICATIONS: reviewed     ALLERGIES:  Patient has no known allergies.    SOCIAL HISTORY (habits pertaining to sleep medicine):  Tobacco use: No   Alcohol use: 5-7 per week  Caffeine use: 2     REVIEW OF SYSTEMS:   Pertinent positive symptoms are:  [place] Sleepiness Scale :Total score: 4         PHYSICAL EXAMINATION:  CONSTITUTIONAL:  Vitals:    01/16/25 1100   BP: 133/76   BP Location: Left arm   Patient Position: Sitting   Pulse: 69   SpO2: 95%   Weight: 90.7 kg (200 lb)   Height: 182.9 cm (72\")    Body mass index is 27.12 kg/m².   HEAD: atraumatic, normocephalic  RESP SYSTEM: not in respiratory distress, breathing unlabored  CARDIOVASULAR: normal rate, no edema noted   NEURO: Alert and oriented x 3, mood and affect appeared appropriate      DATA REVIEWED:  The PAP compliance summary downloaded on 1/8/2025 has been reviewed independently by me and discussed with the patient.   Compliance: 97%  More than 4 hr use: 83%  Average use of the device: 6 hours 34 minutes per night  Residual AHI: 0.5/hr (goal < 5.0 /hr)  Device: ResMed air " sense 10  Mask type: Fullface  DME: [employer]          ASSESSMENT AND PLAN:  Obstructive Sleep Apnea: sleep apnea has improved with the device and treatment.  Patient has excellent compliance with the device for treatment of sleep apnea.  I have personally reviewed the smart card download and discussed the download data with the patient and encouarged continued use of the device.  The residual AHI is acceptable. The device is benefiting the patient and the device is medically necessary.  Recommend patient get supplies from the DME company, change them on a regular basis, and clean as directed.  A prescription for supplies has been sent to the [employer] company.  Recommend continued usage of PAP device, most insurances require minimum usage of 4 hours per night at least 70% of the time, would recommend using all night every night if possible for optimum health.  Recommend prioritizing sleep to aid in overall health.  Do not drive or operate heavy machinery or do activities that require high concentration if feeling tired or drowsy.  Overweight: Body mass index is 27.12 kg/m².. Patients who are overweight or obese are at increased risk of sleep apnea/ sleep disordered breathing. Weight reduction and healthy lifestyle are encouraged in overweight/ obese patients as part of a comprehensive approach to sleep apnea treatment.       Patient will follow-up in 1 YEAR, per patient preference, or follow-up sooner for any issues or concerns.  Patient's questions were answered.        Thank you for allowing me to participate in the care of this patient.     Desi Pérez DNP, [facility] Sleep Medicine

## 2025-02-05 RX ORDER — VALSARTAN AND HYDROCHLOROTHIAZIDE 320; 25 MG/1; MG/1
TABLET, FILM COATED ORAL
Qty: 90 TABLET | Refills: 1 | Status: SHIPPED | OUTPATIENT
Start: 2025-02-05

## 2025-03-14 ENCOUNTER — OFFICE VISIT (OUTPATIENT)
Dept: ORTHOPEDIC SURGERY | Facility: CLINIC | Age: 70
End: 2025-03-14
Payer: MEDICARE

## 2025-03-14 VITALS — HEIGHT: 72 IN | BODY MASS INDEX: 27.09 KG/M2 | WEIGHT: 200 LBS | RESPIRATION RATE: 20 BRPM | OXYGEN SATURATION: 96 %

## 2025-03-14 DIAGNOSIS — M70.61 TROCHANTERIC BURSITIS OF RIGHT HIP: Primary | ICD-10-CM

## 2025-03-14 RX ORDER — LIDOCAINE HYDROCHLORIDE 10 MG/ML
2 INJECTION, SOLUTION EPIDURAL; INFILTRATION; INTRACAUDAL; PERINEURAL
Status: COMPLETED | OUTPATIENT
Start: 2025-03-14 | End: 2025-03-14

## 2025-03-14 RX ORDER — CIPROFLOXACIN 500 MG/1
1 TABLET, FILM COATED ORAL EVERY 12 HOURS SCHEDULED
COMMUNITY
Start: 2025-02-19

## 2025-03-14 RX ORDER — TRIAMCINOLONE ACETONIDE 40 MG/ML
80 INJECTION, SUSPENSION INTRA-ARTICULAR; INTRAMUSCULAR
Status: COMPLETED | OUTPATIENT
Start: 2025-03-14 | End: 2025-03-14

## 2025-03-14 RX ADMIN — TRIAMCINOLONE ACETONIDE 80 MG: 40 INJECTION, SUSPENSION INTRA-ARTICULAR; INTRAMUSCULAR at 13:39

## 2025-03-14 RX ADMIN — LIDOCAINE HYDROCHLORIDE 2 ML: 10 INJECTION, SOLUTION EPIDURAL; INFILTRATION; INTRACAUDAL; PERINEURAL at 13:39

## 2025-03-14 NOTE — PROGRESS NOTES
INJECTION VISIT    Patient: Nahum Restrepo    YOB: 1955    MRN: [id number]    Chief Complaint   Patient presents with    Right Hip - Follow-up       History of Present Illness:   Nahum Restrepo is a 69 y.o. year old who presents today for injection of the right hip. Last injection was 3 months ago and provided good relief of symptoms.         Allergies: No Known Allergies    Medications:   Home Medications:  Current Outpatient Medications on File Prior to Visit   Medication Sig    alfuzosin (UROXATRAL) 10 MG 24 hr tablet Take 1 tablet by mouth Daily.    ALPRAZolam (XANAX) 0.5 MG tablet TAKE 1 TABLET BY MOUTH TWICE DAILY AS NEEDED    amLODIPine (NORVASC) 5 MG tablet TAKE 1 TABLET BY MOUTH DAILY    ciprofloxacin (CIPRO) 500 MG tablet Take 1 tablet by mouth Every 12 (Twelve) Hours.    gabapentin (NEURONTIN) 100 MG capsule Take 1 capsule by mouth Every 12 (Twelve) Hours.    metoprolol succinate XL (TOPROL-XL) 50 MG 24 hr tablet TAKE 1 TABLET BY MOUTH DAILY    Triamcinolone Acetonide (NASACORT) 55 MCG/ACT nasal inhaler 1 spray Daily.    valsartan-hydrochlorothiazide (DIOVAN-HCT) 320-25 MG per tablet TAKE 1 TABLET BY MOUTH DAILY    [DISCONTINUED] multivitamin with minerals tablet tablet Take 1 tablet by mouth Daily.     No current facility-administered medications on file prior to visit.         I have reviewed the patient's medical history in detail and updated the computerized patient record.  Review and summarization of old records include:    Past Medical History:   Diagnosis Date    Hypertension     Mild anxiety     Primary osteoarthritis of right knee 05/18/2021    history of    S/P right knee arthroscopy 07/07/2021     Past Surgical History:   Procedure Laterality Date    CARDIAC CATHETERIZATION      COLONOSCOPY      INGUINAL HERNIA REPAIR Right 10/15/2024    Procedure: INGUINAL HERNIA REPAIR LAPAROSCOPIC WITH DAVINCI ROBOT WITH MESH;  Surgeon: Dorian Pepe MD;  Location: [facility] MAIN OR;   Service: Robotics - DaVinci;  Laterality: Right;    JOINT REPLACEMENT      KNEE ARTHROSCOPY Right 06/16/2021    Procedure: KNEE ARTHROSCOPY with chondroplasty with medial meniscectomy;  Surgeon: Km Felix MD;  Location: [facility] MAIN OR;  Service: Orthopedics;  Laterality: Right;    REPLACEMENT TOTAL KNEE Right 2022    SHOULDER SURGERY Left     TOTAL KNEE ARTHROPLASTY Right 06/21/2022    Procedure: TOTAL KNEE ARTHROPLASTY WITH MINGO ROBOT;  Surgeon: Km Felix MD;  Location: [facility] MAIN OR;  Service: Robotics - Ortho;  Laterality: Right;     Social History     Occupational History    Not on file   Tobacco Use    Smoking status: Never     Passive exposure: Past    Smokeless tobacco: Never   Vaping Use    Vaping status: Never Used   Substance and Sexual Activity    Alcohol use: Yes     Alcohol/week: 4.0 standard drinks of alcohol     Types: 4 Shots of liquor per week     Comment: 6-7 per week    Drug use: Yes     Frequency: 4.0 times per week     Types: Marijuana    Sexual activity: Not Currently      Social History     Social History Narrative    Not on file     Family History   Problem Relation Age of Onset    Arthritis Mother     Osteoarthritis Mother     Other Mother         Immobilized    Sleep apnea Sister     Diabetes Sister                ROS  Negative unless listed in the HPI        Physical Exam  69 y.o. male  Body mass index is 27.12 kg/m²., 90.7 kg (200 lb)  Vitals:    03/14/25 1326   Resp: 20   SpO2: 96%     General: Alert, cooperative, appears well and in no observable distress.   HEENT: Normocephalic, atraumatic on external visual inspection. No icterus.   CV: No significant peripheral edema.   Respiratory: Normal respiratory effort.   Skin: Warm & well perfused; appropriate skin turgor.  Psych: Appropriate mood & affect.  Neuro: Gross sensation and motor intact in affected extremity/extremities.  Vascular: Peripheral pulses palpable in affected extremity/extremities.         Procedure:  Large  Joint Arthrocentesis: R greater trochanteric bursa  Date/Time: 3/14/2025 1:39 PM  Consent given by: patient  Site marked: site marked  Timeout: Immediately prior to procedure a time out was called to verify the correct patient, procedure, equipment, support staff and site/side marked as required   Supporting Documentation  Indications: pain and diagnostic evaluation   Procedure Details  Location: hip - R greater trochanteric bursa  Needle size: 25 G  Approach: lateral  Medications administered: 2 mL lidocaine PF 1% 1 %; 80 mg triamcinolone acetonide 40 MG/ML  Patient tolerance: patient tolerated the procedure well with no immediate complications            Assessment:   Diagnoses and all orders for this visit:    1. Trochanteric bursitis of right hip (Primary)      Body mass index is 27.12 kg/m².  BMI consistent with Overweight: 25.0-29.9kg/m2         Plan:   -     Risks and benefits of injection therapy discussed with patient.    Injected patient's right hip-greater trochanteric bursa joint(s)with Kenalog from an lateral approach   Rest, ice, compression, and elevation (RICE) therapy  OTC Tylenol for pain PRN  Follow up in 3 months for repeat injection  Please call with questions or concerns in the interim        Date of encounter: 03/14/2025   FEI Alex

## 2025-03-25 RX ORDER — AMLODIPINE BESYLATE 5 MG/1
5 TABLET ORAL DAILY
Qty: 90 TABLET | Refills: 1 | Status: SHIPPED | OUTPATIENT
Start: 2025-03-25

## 2025-05-28 ENCOUNTER — OFFICE VISIT (OUTPATIENT)
Dept: ORTHOPEDIC SURGERY | Facility: CLINIC | Age: 70
End: 2025-05-28
Payer: MEDICARE

## 2025-05-28 VITALS — OXYGEN SATURATION: 95 % | WEIGHT: 200 LBS | RESPIRATION RATE: 20 BRPM | BODY MASS INDEX: 27.09 KG/M2 | HEIGHT: 72 IN

## 2025-05-28 DIAGNOSIS — M17.12 PRIMARY OSTEOARTHRITIS OF LEFT KNEE: ICD-10-CM

## 2025-05-28 DIAGNOSIS — G89.29 CHRONIC PAIN OF LEFT KNEE: Primary | ICD-10-CM

## 2025-05-28 DIAGNOSIS — M25.562 CHRONIC PAIN OF LEFT KNEE: Primary | ICD-10-CM

## 2025-05-28 RX ORDER — GABAPENTIN 400 MG/1
CAPSULE ORAL
COMMUNITY
Start: 2025-05-02

## 2025-05-28 RX ORDER — TRIAMCINOLONE ACETONIDE 40 MG/ML
80 INJECTION, SUSPENSION INTRA-ARTICULAR; INTRAMUSCULAR
Status: COMPLETED | OUTPATIENT
Start: 2025-05-28 | End: 2025-05-28

## 2025-05-28 RX ORDER — LIDOCAINE HYDROCHLORIDE 10 MG/ML
2 INJECTION, SOLUTION EPIDURAL; INFILTRATION; INTRACAUDAL; PERINEURAL
Status: COMPLETED | OUTPATIENT
Start: 2025-05-28 | End: 2025-05-28

## 2025-05-28 RX ADMIN — LIDOCAINE HYDROCHLORIDE 2 ML: 10 INJECTION, SOLUTION EPIDURAL; INFILTRATION; INTRACAUDAL; PERINEURAL at 12:24

## 2025-05-28 RX ADMIN — TRIAMCINOLONE ACETONIDE 80 MG: 40 INJECTION, SUSPENSION INTRA-ARTICULAR; INTRAMUSCULAR at 12:24

## 2025-05-28 NOTE — PROGRESS NOTES
[facility]        Patient Name: Nahum Restrepo  : 1955  Primary Care Physician: Leonardo Lujan MD        Chief Complaint:    Chief Complaint   Patient presents with    Left Knee - Pain, Initial Evaluation     Ongoing pain    No injections or PT   No surgery's         HPI:   History of Present Illness  The patient presents for evaluation of left knee pain.    He reports the onset of left knee pain approximately 1 week ago, which he attributes to his daily walking routine of 3 miles since the beginning of the year. The pain began suddenly during the middle of the day and has persisted since then. He describes the pain as less severe than the pain experienced in his right knee prior to its replacement. There is no associated swelling, warmth, or redness in the left knee, and he has not sustained any injury, fall, or twist. Tylenol provides some relief for the pain. He also reports occasional instability in the left knee, a symptom that predates the onset of the current pain. He has ceased his walking routine to prevent further aggravation of the condition and has been using a stationary bicycle for exercise.    He underwent a right knee replacement 3 years ago, following a period of 1.5 years of limping, arthroscopic surgery, injections, and physical therapy. He expresses a desire to avoid a similar course of treatment for his left knee.      SOCIAL HISTORY  Exercise: Walks approximately 3 miles a day and uses a stationary bicycle.            Past Medical/Surgical, Social and Family History:  I have reviewed and/or updated pertinent history as noted in the medical record including:  Past Medical History:   Diagnosis Date    Hypertension     Mild anxiety     Primary osteoarthritis of right knee 2021    history of    S/P right knee arthroscopy 2021     Past Surgical History:   Procedure Laterality Date    CARDIAC CATHETERIZATION      COLONOSCOPY      INGUINAL HERNIA REPAIR Right 10/15/2024     Procedure: INGUINAL HERNIA REPAIR LAPAROSCOPIC WITH DAVINCI ROBOT WITH MESH;  Surgeon: Dorian Pepe MD;  Location: [facility] MAIN OR;  Service: Robotics - DaVinci;  Laterality: Right;    JOINT REPLACEMENT      KNEE ARTHROSCOPY Right 06/16/2021    Procedure: KNEE ARTHROSCOPY with chondroplasty with medial meniscectomy;  Surgeon: Km Felix MD;  Location: [facility] MAIN OR;  Service: Orthopedics;  Laterality: Right;    REPLACEMENT TOTAL KNEE Right 2022    SHOULDER SURGERY Left     TOTAL KNEE ARTHROPLASTY Right 06/21/2022    Procedure: TOTAL KNEE ARTHROPLASTY WITH MINGO ROBOT;  Surgeon: Km Felix MD;  Location: [facility] MAIN OR;  Service: Robotics - Ortho;  Laterality: Right;     Social History     Occupational History    Not on file   Tobacco Use    Smoking status: Never     Passive exposure: Past    Smokeless tobacco: Never   Vaping Use    Vaping status: Never Used   Substance and Sexual Activity    Alcohol use: Yes     Alcohol/week: 4.0 standard drinks of alcohol     Types: 4 Shots of liquor per week     Comment: 6-7 per week    Drug use: Yes     Frequency: 4.0 times per week     Types: Marijuana    Sexual activity: Not Currently          Allergies: No Known Allergies    Medications:   Home Medications:  Current Outpatient Medications on File Prior to Visit   Medication Sig    alfuzosin (UROXATRAL) 10 MG 24 hr tablet Take 1 tablet by mouth Daily.    ALPRAZolam (XANAX) 0.5 MG tablet TAKE 1 TABLET BY MOUTH TWICE DAILY AS NEEDED    amLODIPine (NORVASC) 5 MG tablet TAKE 1 TABLET BY MOUTH DAILY    ciprofloxacin (CIPRO) 500 MG tablet Take 1 tablet by mouth Every 12 (Twelve) Hours.    gabapentin (NEURONTIN) 400 MG capsule     metoprolol succinate XL (TOPROL-XL) 50 MG 24 hr tablet TAKE 1 TABLET BY MOUTH DAILY    Triamcinolone Acetonide (NASACORT) 55 MCG/ACT nasal inhaler 1 spray Daily.    valsartan-hydrochlorothiazide (DIOVAN-HCT) 320-25 MG per tablet TAKE 1 TABLET BY MOUTH DAILY    [DISCONTINUED] gabapentin (NEURONTIN)  100 MG capsule Take 1 capsule by mouth Every 12 (Twelve) Hours.     No current facility-administered medications on file prior to visit.         ROS:  Negative unless listed in the HPI    Physical Exam:   69 y.o. male  Body mass index is 27.12 kg/m²., 90.7 kg (200 lb)  Vitals:    05/28/25 1049   Resp: 20   SpO2: 95%     General: Alert, cooperative, appears well and in no observable distress.   HEENT: Normocephalic, atraumatic on external visual inspection. No icterus.   CV: No significant peripheral edema.    Respiratory: Normal respiratory effort.   Skin: Warm & well perfused; appropriate skin turgor.  Psych: Appropriate mood & affect.  Neuro: Gross sensation and motor intact in affected extremity/extremities.  Vascular: Peripheral pulses palpable in affected extremity/extremities.     Ortho Exam   Knee Musculoskeletal Exam  Gait    Gait is normal.    Inspection    Left      Erythema: none        Effusion: none        Edema: none        Ecchymosis: none        Deformity: none        Alignment: normal      Palpation    Left      Increased warmth: none        Masses: none        Crepitus: none        Tenderness: none      Range of Motion    Left      Active extension: 0      Active flexion: 130     Instability    Left      Varus stress grade: normal      Valgus stress grade: normal      Radiology:  X-Ray Report:  Left knee(s) X-Ray  Indication: Evaluation of pain  AP, Lateral views  Findings: mild degenerative changes. No acute injury noted   Bony lesion: no  Soft tissues: within normal limits  Joint spaces: subnormal  Hardware appropriately positioned: not applicable  Prior studies available for comparison: no       Large Joint Arthrocentesis: L knee  Date/Time: 5/28/2025 12:24 PM  Consent given by: patient  Site marked: site marked  Timeout: Immediately prior to procedure a time out was called to verify the correct patient, procedure, equipment, support staff and site/side marked as required   Supporting  Documentation  Indications: pain and diagnostic evaluation   Procedure Details  Location: knee - L knee  Preparation: Patient was prepped and draped in the usual sterile fashion  Needle size: 25 G  Approach: anterolateral  Medications administered: 2 mL lidocaine PF 1% 1 %; 80 mg triamcinolone acetonide 40 MG/ML  Patient tolerance: patient tolerated the procedure well with no immediate complications          Assessment:  Assessment & Plan  1. Left knee osteoarthritis:    A cortisone injection will be administered today to alleviate discomfort. Use a neoprene knee sleeve during walks for additional support. If pain persists or worsens, an MRI may be considered to further evaluate the condition.    PROCEDURE  Steroid injection was administered in the left knee joint today.    Body mass index is 27.12 kg/m².  BMI consistent with Overweight: 25.0-29.9kg/m2        Patient encouraged to call with any questions or concerns in the interim    FEI Alex     Patient or patient representative verbalized consent for the use of Ambient Listening during the visit with  FEI Alex for chart documentation. 5/28/2025  12:22 EDT

## 2025-06-13 ENCOUNTER — TELEPHONE (OUTPATIENT)
Dept: NEUROSURGERY | Facility: CLINIC | Age: 70
End: 2025-06-13
Payer: MEDICARE

## 2025-06-13 NOTE — TELEPHONE ENCOUNTER
Patient requested a refill on the pain cream that had been prescribed in the past and I did give the Pharmacist a Verbal OK to Refill one time.

## 2025-06-17 ENCOUNTER — OFFICE VISIT (OUTPATIENT)
Dept: ORTHOPEDIC SURGERY | Facility: CLINIC | Age: 70
End: 2025-06-17
Payer: MEDICARE

## 2025-06-17 VITALS — HEIGHT: 72 IN | BODY MASS INDEX: 27.09 KG/M2 | OXYGEN SATURATION: 95 % | WEIGHT: 200 LBS | RESPIRATION RATE: 20 BRPM

## 2025-06-17 DIAGNOSIS — M17.12 PRIMARY OSTEOARTHRITIS OF LEFT KNEE: Primary | ICD-10-CM

## 2025-06-17 DIAGNOSIS — M70.61 TROCHANTERIC BURSITIS OF RIGHT HIP: ICD-10-CM

## 2025-06-17 RX ORDER — TRIAMCINOLONE ACETONIDE 40 MG/ML
80 INJECTION, SUSPENSION INTRA-ARTICULAR; INTRAMUSCULAR
Status: COMPLETED | OUTPATIENT
Start: 2025-06-17 | End: 2025-06-17

## 2025-06-17 RX ORDER — LIDOCAINE HYDROCHLORIDE 10 MG/ML
2 INJECTION, SOLUTION EPIDURAL; INFILTRATION; INTRACAUDAL; PERINEURAL
Status: COMPLETED | OUTPATIENT
Start: 2025-06-17 | End: 2025-06-17

## 2025-06-17 RX ADMIN — TRIAMCINOLONE ACETONIDE 80 MG: 40 INJECTION, SUSPENSION INTRA-ARTICULAR; INTRAMUSCULAR at 13:35

## 2025-06-17 RX ADMIN — LIDOCAINE HYDROCHLORIDE 2 ML: 10 INJECTION, SOLUTION EPIDURAL; INFILTRATION; INTRACAUDAL; PERINEURAL at 13:35

## 2025-06-17 NOTE — PROGRESS NOTES
INJECTION VISIT    Patient: Nahum Restrepo    YOB: 1955    MRN: [id number]    Chief Complaint   Patient presents with    Right Hip - Follow-up       History of Present Illness:   Nahum Restrepo is a 69 y.o. year old who presents today for injection of the right hip. He has chronic right greater trochanteric bursitis that is treated with steroid injection. He receives good symptom relief with injections.     He was also recently seen for LEFT knee pain and treated with a steroid injection. He states it only provided minimal relief and he is interested in an alternative options. Previous treatments have included tylenol, NSAIDs, bracing, PT and steroid injection.         Allergies: No Known Allergies    Medications:   Home Medications:  Current Outpatient Medications on File Prior to Visit   Medication Sig    alfuzosin (UROXATRAL) 10 MG 24 hr tablet Take 1 tablet by mouth Daily.    ALPRAZolam (XANAX) 0.5 MG tablet TAKE 1 TABLET BY MOUTH TWICE DAILY AS NEEDED    amLODIPine (NORVASC) 5 MG tablet TAKE 1 TABLET BY MOUTH DAILY    ciprofloxacin (CIPRO) 500 MG tablet Take 1 tablet by mouth Every 12 (Twelve) Hours.    gabapentin (NEURONTIN) 400 MG capsule     metoprolol succinate XL (TOPROL-XL) 50 MG 24 hr tablet TAKE 1 TABLET BY MOUTH DAILY    Triamcinolone Acetonide (NASACORT) 55 MCG/ACT nasal inhaler 1 spray Daily.    valsartan-hydrochlorothiazide (DIOVAN-HCT) 320-25 MG per tablet TAKE 1 TABLET BY MOUTH DAILY     No current facility-administered medications on file prior to visit.         I have reviewed the patient's medical history in detail and updated the computerized patient record.  Review and summarization of old records include:    Past Medical History:   Diagnosis Date    Hypertension     Mild anxiety     Primary osteoarthritis of right knee 05/18/2021    history of    S/P right knee arthroscopy 07/07/2021     Past Surgical History:   Procedure Laterality Date    CARDIAC CATHETERIZATION       COLONOSCOPY      INGUINAL HERNIA REPAIR Right 10/15/2024    Procedure: INGUINAL HERNIA REPAIR LAPAROSCOPIC WITH DAVINCI ROBOT WITH MESH;  Surgeon: Dorian Pepe MD;  Location: [facility] MAIN OR;  Service: Robotics - DaVinci;  Laterality: Right;    JOINT REPLACEMENT      KNEE ARTHROSCOPY Right 06/16/2021    Procedure: KNEE ARTHROSCOPY with chondroplasty with medial meniscectomy;  Surgeon: Km Felix MD;  Location: [facility] MAIN OR;  Service: Orthopedics;  Laterality: Right;    REPLACEMENT TOTAL KNEE Right 2022    SHOULDER SURGERY Left     TOTAL KNEE ARTHROPLASTY Right 06/21/2022    Procedure: TOTAL KNEE ARTHROPLASTY WITH MINGO ROBOT;  Surgeon: Km Felix MD;  Location: [facility] MAIN OR;  Service: Robotics - Ortho;  Laterality: Right;     Social History     Occupational History    Not on file   Tobacco Use    Smoking status: Never     Passive exposure: Past    Smokeless tobacco: Never   Vaping Use    Vaping status: Never Used   Substance and Sexual Activity    Alcohol use: Yes     Alcohol/week: 4.0 standard drinks of alcohol     Types: 4 Shots of liquor per week     Comment: 6-7 per week    Drug use: Yes     Frequency: 4.0 times per week     Types: Marijuana    Sexual activity: Not Currently      Social History     Social History Narrative    Not on file     Family History   Problem Relation Age of Onset    Arthritis Mother     Osteoarthritis Mother     Other Mother         Immobilized    Sleep apnea Sister     Diabetes Sister                ROS  Negative unless listed in the HPI        Physical Exam  69 y.o. male  Body mass index is 27.12 kg/m²., 90.7 kg (200 lb)  Vitals:    06/17/25 1306   Resp: 20   SpO2: 95%     General: Alert, cooperative, appears well and in no observable distress.   HEENT: Normocephalic, atraumatic on external visual inspection. No icterus.   CV: No significant peripheral edema.   Respiratory: Normal respiratory effort.   Skin: Warm & well perfused; appropriate skin turgor.  Psych:  Appropriate mood & affect.  Neuro: Gross sensation and motor intact in affected extremity/extremities.  Vascular: Peripheral pulses palpable in affected extremity/extremities.         Procedure:  Large Joint Arthrocentesis: R greater trochanteric bursa  Date/Time: 6/17/2025 1:35 PM  Consent given by: patient  Site marked: site marked  Timeout: Immediately prior to procedure a time out was called to verify the correct patient, procedure, equipment, support staff and site/side marked as required   Supporting Documentation  Indications: pain and diagnostic evaluation   Procedure Details  Location: hip - R greater trochanteric bursa  Preparation: Patient was prepped and draped in the usual sterile fashion  Needle size: 25 G  Approach: lateral  Medications administered: 2 mL lidocaine PF 1% 1 %; 80 mg triamcinolone acetonide 40 MG/ML  Patient tolerance: patient tolerated the procedure well with no immediate complications            Assessment:   Diagnoses and all orders for this visit:    1. Primary osteoarthritis of left knee (Primary)    2. Trochanteric bursitis of right hip      Body mass index is 27.12 kg/m².  BMI consistent with Overweight: 25.0-29.9kg/m2         Plan:   -     Risks and benefits of injection therapy discussed with patient.    Injected patient's right hip-greater trochanteric bursa joint(s)with Kenalog from an lateral approach   For the LEFT knee, will proceed with VISCO injections  Rest, ice, compression, and elevation (RICE) therapy  OTC Tylenol for pain PRN  Follow up in 2-3 weeks with VISCO injection of the left knee   Please call with questions or concerns in the interim        Date of encounter: 06/17/2025   FEI Alex

## 2025-06-23 ENCOUNTER — TELEPHONE (OUTPATIENT)
Dept: ORTHOPEDIC SURGERY | Facility: CLINIC | Age: 70
End: 2025-06-23

## 2025-06-23 NOTE — TELEPHONE ENCOUNTER
"    Caller: Nahum Restrepo \"CLAIR\"    Relationship to patient: Self    Best call back number:     Chief complaint: LEFT KNEE    Type of visit: FUP GEL INJECTION     Requested date: SHOULD BE AVAILABLE TUESDAY      Additional notes:INJECTION SHOULD ARRIVE TOMORROW PLEASE CALL TO SCHEDULE   "

## 2025-06-24 ENCOUNTER — TELEPHONE (OUTPATIENT)
Dept: ORTHOPEDIC SURGERY | Facility: CLINIC | Age: 70
End: 2025-06-24

## 2025-06-24 ENCOUNTER — TELEPHONE (OUTPATIENT)
Dept: FAMILY MEDICINE CLINIC | Facility: CLINIC | Age: 70
End: 2025-06-24
Payer: MEDICARE

## 2025-06-24 NOTE — TELEPHONE ENCOUNTER
Per [facility] site PATIENT SUPPLIED Durolane was shipped on 06/23/2025. Will call and [facility]'ed patient once has arrived in office.

## 2025-06-24 NOTE — TELEPHONE ENCOUNTER
"Caller: Nahum Restrepo \"CLAIR\"    Relationship to patient: Self    Best call back number: [phone]    Patient is needing: PATIENT STATES THAT HE IS IN SIGNIFICANT PAIN AND WANTS TO KNOW IF SOMETHING CAN BE SENT IN FOR HIS PAIN LEVEL IN THE MEANTIME BEFORE HIS LEFT KNEE INJ ON 7/1/25.   PLEASE ADVISE PATIENT     "

## 2025-06-24 NOTE — TELEPHONE ENCOUNTER
"    Caller: Nahum Restrepo \"CLAIR\"    Relationship to patient: Self    Best call back number: [phone]    Chief complaint: LEFT KNEE PAIN    Type of visit: GEL INJECTION    Requested date: ASAP      "

## 2025-06-24 NOTE — TELEPHONE ENCOUNTER
"Caller: Nahum Restrepo \"CLAIR\"    Relationship: Self    Best call back number: [phone]    What medication are you requesting: PAIN AND INFLAMMATORY MEDICATION     What are your current symptoms: LEFT KNEE PAIN -GETS INJECTION ON 7.1.25.    How long have you been experiencing symptoms: N/A    Have you had these symptoms before:    [] Yes  [x] No    Have you been treated for these symptoms before:   [] Yes  [x] No    If a prescription is needed, what is your preferred pharmacy and phone number: [id number] - [place], [place] - [address] AT [facility] [address] [facility] [phone] [facility] [phone]      Additional notes:      PLEASE CALL TO CONFIRM OR DENY.     "

## 2025-06-24 NOTE — TELEPHONE ENCOUNTER
I called the pt and let him know that Cristiana ENAMORADO does not prescribe pain medication, she only gives pain medication to post-op surgery pt's. I recommended the pt reach out to his PCP. Pt verbally understood.

## 2025-06-24 NOTE — TELEPHONE ENCOUNTER
Per [facility] site PATIENT SUPPLIED Durolane was shipped on 06/23/2025. [facility] once has arrived in office.

## 2025-06-25 NOTE — TELEPHONE ENCOUNTER
Can you please let patient know that PMJ is out and the provider covering will get to this message as soon as he's able?

## 2025-06-25 NOTE — TELEPHONE ENCOUNTER
Patient was seen at urgent care this morning, no additional medications sent in from our office providers.

## 2025-06-25 NOTE — TELEPHONE ENCOUNTER
Gave message to patient at 1:50pm. He completely voiced understanding.   normal mouth and gums/moist

## 2025-06-25 NOTE — TELEPHONE ENCOUNTER
"  Caller: Nahum Restrepo \"CLAIR\"    Relationship: Self    Best call back number: [phone] (Mobile)       What was the call regarding: PATIENT ASKING FOR AN UPDATE ON THIS MESSAGE FOR A NEW MEDICATION / KNEE PAIN     Is it okay if the provider responds through MyChart: PLEASE ADVISE     "

## 2025-07-01 ENCOUNTER — OFFICE VISIT (OUTPATIENT)
Dept: ORTHOPEDIC SURGERY | Facility: CLINIC | Age: 70
End: 2025-07-01
Payer: MEDICARE

## 2025-07-01 VITALS — HEART RATE: 77 BPM | WEIGHT: 200 LBS | OXYGEN SATURATION: 96 % | HEIGHT: 72 IN | BODY MASS INDEX: 27.09 KG/M2

## 2025-07-01 DIAGNOSIS — M17.12 PRIMARY OSTEOARTHRITIS OF LEFT KNEE: ICD-10-CM

## 2025-07-01 RX ORDER — LIDOCAINE HYDROCHLORIDE 10 MG/ML
2 INJECTION, SOLUTION EPIDURAL; INFILTRATION; INTRACAUDAL; PERINEURAL
Status: COMPLETED | OUTPATIENT
Start: 2025-07-01 | End: 2025-07-01

## 2025-07-01 RX ADMIN — LIDOCAINE HYDROCHLORIDE 2 ML: 10 INJECTION, SOLUTION EPIDURAL; INFILTRATION; INTRACAUDAL; PERINEURAL at 08:33

## 2025-07-01 NOTE — PROGRESS NOTES
INJECTION VISIT    Patient: Nahum Restrepo    YOB: 1955    MRN: [id number]    Chief Complaint   Patient presents with    Left Knee - Follow-up       History of Present Illness:   Nahum Restrepo is a 69 y.o. year old who presents today for injection of the left knee with Durolane. This will be his first gel injection.         Allergies: No Known Allergies    Medications:   Home Medications:  Current Outpatient Medications on File Prior to Visit   Medication Sig    alfuzosin (UROXATRAL) 10 MG 24 hr tablet Take 1 tablet by mouth Daily.    ALPRAZolam (XANAX) 0.5 MG tablet TAKE 1 TABLET BY MOUTH TWICE DAILY AS NEEDED    amLODIPine (NORVASC) 5 MG tablet TAKE 1 TABLET BY MOUTH DAILY    ciprofloxacin (CIPRO) 500 MG tablet Take 1 tablet by mouth Every 12 (Twelve) Hours.    Durolane 60 MG/3ML prefilled syringe injection     gabapentin (NEURONTIN) 400 MG capsule     metoprolol succinate XL (TOPROL-XL) 50 MG 24 hr tablet TAKE 1 TABLET BY MOUTH DAILY    predniSONE (DELTASONE) 20 MG tablet Take 1 tablet by mouth 2 (Two) Times a Day for 7 days.    Triamcinolone Acetonide (NASACORT) 55 MCG/ACT nasal inhaler 1 spray Daily.    valsartan-hydrochlorothiazide (DIOVAN-HCT) 320-25 MG per tablet TAKE 1 TABLET BY MOUTH DAILY     No current facility-administered medications on file prior to visit.         I have reviewed the patient's medical history in detail and updated the computerized patient record.  Review and summarization of old records include:    Past Medical History:   Diagnosis Date    Hypertension     Mild anxiety     Primary osteoarthritis of right knee 05/18/2021    history of    S/P right knee arthroscopy 07/07/2021     Past Surgical History:   Procedure Laterality Date    CARDIAC CATHETERIZATION      COLONOSCOPY      INGUINAL HERNIA REPAIR Right 10/15/2024    Procedure: INGUINAL HERNIA REPAIR LAPAROSCOPIC WITH DAVINCI ROBOT WITH MESH;  Surgeon: Dorian Pepe MD;  Location: [facility] MAIN OR;  Service:  Robotics - DaVinci;  Laterality: Right;    JOINT REPLACEMENT      KNEE ARTHROSCOPY Right 06/16/2021    Procedure: KNEE ARTHROSCOPY with chondroplasty with medial meniscectomy;  Surgeon: Km Felix MD;  Location: [facility] MAIN OR;  Service: Orthopedics;  Laterality: Right;    REPLACEMENT TOTAL KNEE Right 2022    SHOULDER SURGERY Left     TOTAL KNEE ARTHROPLASTY Right 06/21/2022    Procedure: TOTAL KNEE ARTHROPLASTY WITH MINGO ROBOT;  Surgeon: Km Felix MD;  Location: [facility] MAIN OR;  Service: Robotics - Ortho;  Laterality: Right;     Social History     Occupational History    Not on file   Tobacco Use    Smoking status: Never     Passive exposure: Past    Smokeless tobacco: Never   Vaping Use    Vaping status: Never Used   Substance and Sexual Activity    Alcohol use: Yes     Alcohol/week: 4.0 standard drinks of alcohol     Types: 4 Shots of liquor per week     Comment: 6-7 per week    Drug use: Yes     Frequency: 4.0 times per week     Types: Marijuana    Sexual activity: Not Currently      Social History     Social History Narrative    Not on file     Family History   Problem Relation Age of Onset    Arthritis Mother     Osteoarthritis Mother     Other Mother         Immobilized    Sleep apnea Sister     Diabetes Sister                ROS  Negative unless listed in the HPI        Physical Exam  69 y.o. male  Body mass index is 27.12 kg/m²., 90.7 kg (200 lb)  Vitals:    07/01/25 0807   Pulse: 77   SpO2: 96%     General: Alert, cooperative, appears well and in no observable distress.   HEENT: Normocephalic, atraumatic on external visual inspection. No icterus.   CV: No significant peripheral edema.   Respiratory: Normal respiratory effort.   Skin: Warm & well perfused; appropriate skin turgor.  Psych: Appropriate mood & affect.  Neuro: Gross sensation and motor intact in affected extremity/extremities.  Vascular: Peripheral pulses palpable in affected extremity/extremities.         Procedure:  Large Joint  Arthrocentesis: L knee  Date/Time: 7/1/2025 8:33 AM  Consent given by: patient  Site marked: site marked  Timeout: Immediately prior to procedure a time out was called to verify the correct patient, procedure, equipment, support staff and site/side marked as required   Supporting Documentation  Indications: pain and diagnostic evaluation   Procedure Details  Location: knee - L knee  Preparation: Patient was prepped and draped in the usual sterile fashion  Needle size: 18 G  Approach: anterolateral  Medications administered: 2 mL lidocaine PF 1% 1 %; 60 mg Sodium Hyaluronate 60 MG/3ML  Patient tolerance: patient tolerated the procedure well with no immediate complications            Assessment:   Diagnoses and all orders for this visit:    1. Primary osteoarthritis of left knee  -     Visco Treatment      Body mass index is 27.12 kg/m².  BMI consistent with Overweight: 25.0-29.9kg/m2         Plan:   -     Risks and benefits of injection therapy discussed with patient.    Injected patient's left knee joint(s)with Durolane from an anterolateral approach   Compression/brace   Rest, ice, compression, and elevation (RICE) therapy  OTC Tylenol for pain PRN  Follow up in 4-5 months. If no improvement, patient to call and will refer to Dr. Mercado for TKA evaluation  Please call with questions or concerns in the interim        Date of encounter: 07/01/2025   FEI Alex

## 2025-07-14 ENCOUNTER — TELEPHONE (OUTPATIENT)
Dept: FAMILY MEDICINE CLINIC | Facility: CLINIC | Age: 70
End: 2025-07-14
Payer: MEDICARE

## 2025-07-14 NOTE — TELEPHONE ENCOUNTER
Spoke with patient at 11:43am and rescheduled his Medicare Wellness Exam with Dr Lujan to 10/16/2025 at 8:30am. Scheduled a [facility] lab appt on 10/09/2025 at 10am.

## 2025-07-14 NOTE — TELEPHONE ENCOUNTER
"  Caller: Nahum Restrepo \"CLAIR\"    Relationship: Self    Best call back number: [phone]    What orders are you requesting (i.e. lab or imaging): LABS FOR WELLNESS SCHEDULED 8-4-25     In what timeframe would the patient need to come in: WEEK OF JULY 28, 2025      Additional notes: PLEASE CALL TO SCHEDULE AFTER ORDERS ARE ENTERED         "

## 2025-07-14 NOTE — TELEPHONE ENCOUNTER
Please call and r/s his wellness. Only schedule for labs if seeing Dr. Lujan, if seeing someone else, they'll do labs same day.

## 2025-08-04 RX ORDER — VALSARTAN AND HYDROCHLOROTHIAZIDE 320; 25 MG/1; MG/1
1 TABLET, FILM COATED ORAL DAILY
Qty: 90 TABLET | Refills: 1 | Status: SHIPPED | OUTPATIENT
Start: 2025-08-04

## 2025-08-11 ENCOUNTER — TELEPHONE (OUTPATIENT)
Dept: FAMILY MEDICINE CLINIC | Facility: CLINIC | Age: 70
End: 2025-08-11
Payer: MEDICARE

## (undated) DEVICE — TUBING, SUCTION, 1/4" X 12', STRAIGHT: Brand: MEDLINE

## (undated) DEVICE — SOL IRR NACL 0.9PCT ARTHROMATIC 3000ML

## (undated) DEVICE — BLD CUT FORMLA AGGR ANGL 4MM

## (undated) DEVICE — BNDG ESMARK 6INX9FT STRL

## (undated) DEVICE — SOLUTION,WATER,IRRIGATION,1000ML,STERILE: Brand: MEDLINE

## (undated) DEVICE — DECANTER: Brand: UNBRANDED

## (undated) DEVICE — TRENDELENBURG ARM PROTECTORS - STANDARD: Brand: SOULE MEDICAL

## (undated) DEVICE — HANDPIECE SET WITH COAXIAL MULTI-ORIFICE TIP AND SUCTION TUBE: Brand: INTERPULSE

## (undated) DEVICE — SPNG CVR STR 2S 4X4

## (undated) DEVICE — TROC BLADLES AIRSEAL/OPTI THRD 8X120MM 1P/U

## (undated) DEVICE — INTEGUSEAL MICROBIAL SEALANT: Brand: AVANOS

## (undated) DEVICE — NEEDLE, QUINCKE, 18GX3.5": Brand: MEDLINE

## (undated) DEVICE — UNDYED BRAIDED (POLYGLACTIN 910), SYNTHETIC ABSORBABLE SUTURE: Brand: COATED VICRYL

## (undated) DEVICE — CUFF TOURNI 1BLADDER 1PRT 30IN STRL

## (undated) DEVICE — SYR LUERLOK 30CC

## (undated) DEVICE — DUAL CUT SAGITTAL BLADE

## (undated) DEVICE — PAD UNDERCAST WYTEX 6IN 4YD LF STRL

## (undated) DEVICE — TBG ARTHSCP PUMP W CONN/REDUC 8FT

## (undated) DEVICE — BLANKT WARM UPPR/BDY ARM/OUT 57X196CM

## (undated) DEVICE — ST TBG AIRSEAL BIF FLTR W/ACT/CHARCOAL/FLTR

## (undated) DEVICE — SCRW HEX PERSONA 3.5X3.2X33MM
Type: IMPLANTABLE DEVICE | Site: KNEE | Status: NON-FUNCTIONAL
Removed: 2022-06-21

## (undated) DEVICE — CAP ZIMMER ROBOT DISP W/NAVITRACKER/FINS/TROCAR/DRL/PN

## (undated) DEVICE — BLADELESS OBTURATOR: Brand: WECK VISTA

## (undated) DEVICE — 3M™ STERI-STRIP™ REINFORCED ADHESIVE SKIN CLOSURES, R1547, 1/2 IN X 4 IN (12 MM X 100 MM), 6 STRIPS/ENVELOPE: Brand: 3M™ STERI-STRIP™

## (undated) DEVICE — BNDG ELAS ELITE V/CLOSE 6IN 5YD LF STRL

## (undated) DEVICE — DRSNG TELFA PAD NONADH STR 1S 3X8IN

## (undated) DEVICE — PAD UNDERCAST WYTEX 4IN 4YD LF STRL

## (undated) DEVICE — KT SURG TURNOVER 050

## (undated) DEVICE — DRAPE,C-SECTION,CLR SCREEN,WIRE: Brand: MEDLINE

## (undated) DEVICE — SUT MNCRYL 3/0 Y936H

## (undated) DEVICE — BLD SHAVER EXCALIBUR CRV 4MM

## (undated) DEVICE — COLUMN DRAPE

## (undated) DEVICE — SUT VIC 2/0 CT2 27IN J269H

## (undated) DEVICE — GLV SURG SIGNATURE ESSENTIAL PF LTX SZ8.5

## (undated) DEVICE — GLV SURG SENSICARE PI ORTHO SZ8 LF STRL

## (undated) DEVICE — ADHS SKIN PREMIERPRO EXOFIN TOPICAL HI/VISC .5ML

## (undated) DEVICE — GAUZE,SPONGE,FLUFF,6"X6.75",STRL,5/TRAY: Brand: MEDLINE

## (undated) DEVICE — ANTIBACTERIAL UNDYED BRAIDED (POLYGLACTIN 910), SYNTHETIC ABSORBABLE SUTURE: Brand: COATED VICRYL

## (undated) DEVICE — GLV SURG SIGNATURE ESSENTIAL PF LTX SZ7.5

## (undated) DEVICE — SHEET,DRAPE,53X77,STERILE: Brand: MEDLINE

## (undated) DEVICE — GLV SURG SENSICARE PI LF PF 8 GRN STRL

## (undated) DEVICE — STERILE PATIENT PROTECTIVE PAD FOR IMP® KNEE POSITIONERS & COHESIVE WRAP (10 / CASE): Brand: DE MAYO KNEE POSITIONER®

## (undated) DEVICE — SEAL

## (undated) DEVICE — INTENDED FOR TISSUE SEPARATION, AND OTHER PROCEDURES THAT REQUIRE A SHARP SURGICAL BLADE TO PUNCTURE OR CUT.: Brand: BARD-PARKER ® CARBON RIB-BACK BLADES

## (undated) DEVICE — GLV SURG SENSICARE PI MIC PF SZ7.5 LF STRL

## (undated) DEVICE — CUFF TOURNI 1BLADDER 1PRT 34IN STRL

## (undated) DEVICE — SOL IRRIG NACL 1000ML

## (undated) DEVICE — CEMENT MIXING SYSTEM WITH FEMORAL BREAKWAY NOZZLE: Brand: REVOLUTION

## (undated) DEVICE — THE STERILE CAMERA HANDLE COVER IS FOR USE WITH THE STERIS SURGICAL LIGHTING AND VISUALIZATION SYSTEMS.

## (undated) DEVICE — WRAP KNEE COLD THERAPY

## (undated) DEVICE — UNDERGLV SURG BIOGEL INDICAT PI SZ8 BLU

## (undated) DEVICE — TBG ARTHSCP PT W CONN/REDUC 8FT

## (undated) DEVICE — SLV SCD CALF HEMOFORCE DVT THERP REPROC MD

## (undated) DEVICE — DRSNG SURESITE WNDW 4X4.5

## (undated) DEVICE — ABL ASP APOLLO RF XL 90D

## (undated) DEVICE — 40580 - THE PINK PAD - ADVANCED TRENDELENBURG POSITIONING KIT: Brand: 40580 - THE PINK PAD - ADVANCED TRENDELENBURG POSITIONING KIT

## (undated) DEVICE — TBG ARTHSCP DUALWAVE

## (undated) DEVICE — ADHS LIQ MASTISOL 2/3ML

## (undated) DEVICE — ARM DRAPE

## (undated) DEVICE — GLV SURG SENSICARE PI ORTHO SZ8.5 LF STRL

## (undated) DEVICE — ZIPPERED TOGA, 2X LARGE: Brand: FLYTE

## (undated) DEVICE — THE STERILE LIGHT HANDLE COVER IS USED WITH STERIS SURGICAL LIGHTING AND VISUALIZATION SYSTEMS.

## (undated) DEVICE — PAD,ABDOMINAL,5"X9",STERILE,LF,1/PK: Brand: MEDLINE INDUSTRIES, INC.

## (undated) DEVICE — IRRIGATOR BULB ASEPTO 60CC STRL

## (undated) DEVICE — PK TOTL KN 50

## (undated) DEVICE — GENERAL LAPAROSCOPY CDS: Brand: MEDLINE INDUSTRIES, INC.

## (undated) DEVICE — PK KN ARTHROSCOPY 50

## (undated) DEVICE — TIP COVER ACCESSORY

## (undated) DEVICE — NDL HYPO PRECISIONGLIDE/REG 18G 11/2 PNK

## (undated) DEVICE — PICO 7 10CM X 20CM: Brand: PICO™ 7

## (undated) DEVICE — TOTAL TRAY, DB, 100% SILI FOLEY, 16FR 10: Brand: MEDLINE

## (undated) DEVICE — DRP ROBOTIC ROSA BX/20

## (undated) DEVICE — LAPAROVUE VISIBILITY SYSTEM LAPAROSCOPIC SOLUTIONS: Brand: LAPAROVUE

## (undated) DEVICE — DRAPE,U/ SHT,SPLIT,PLAS,STERIL: Brand: MEDLINE

## (undated) DEVICE — FAN SPRAY KIT: Brand: PULSAVAC®

## (undated) DEVICE — ENDOPATH XCEL WITH OPTIVIEW TECHNOLOGY BLADELESS TROCARS WITH STABILITY SLEEVES: Brand: ENDOPATH XCEL OPTIVIEW

## (undated) DEVICE — UNDRGLV SURG BIOGEL PIMICROINDICATOR SYNTH SZ8 LF STRL

## (undated) DEVICE — DRAPE SHEET ULTRAGARD: Brand: MEDLINE

## (undated) DEVICE — PICO 7 10CM X 30CM: Brand: PICO™ 7